# Patient Record
Sex: MALE | Race: WHITE | NOT HISPANIC OR LATINO | Employment: OTHER | URBAN - METROPOLITAN AREA
[De-identification: names, ages, dates, MRNs, and addresses within clinical notes are randomized per-mention and may not be internally consistent; named-entity substitution may affect disease eponyms.]

---

## 2017-01-11 ENCOUNTER — ALLIED HEALTH/NURSE VISIT (OUTPATIENT)
Dept: EDUCATION SERVICES | Facility: CLINIC | Age: 67
End: 2017-01-11
Payer: COMMERCIAL

## 2017-01-11 DIAGNOSIS — E11.42 TYPE 2 DIABETES, CONTROLLED, WITH PERIPHERAL NEUROPATHY (H): Primary | ICD-10-CM

## 2017-01-11 PROCEDURE — 95250 CONT GLUC MNTR PHYS/QHP EQP: CPT

## 2017-01-11 RX ORDER — LANCETS
EACH MISCELLANEOUS
Qty: 1 BOX | Refills: 1 | Status: SHIPPED
Start: 2017-01-11 | End: 2018-03-22

## 2017-01-11 NOTE — MR AVS SNAPSHOT
"              After Visit Summary   1/11/2017    Felipe Campbell    MRN: [id number]           Patient Information     Date Of Birth          1950        Visit Information        Provider Department      1/11/2017 4:00 PM  DIABETIC ED RESOURCE [facility]        Today's Diagnoses     Type 2 diabetes, controlled, with peripheral neuropathy (H)    -  1       Care Instructions    1. After 2 hours, you will be prompted to enter 2 blood sugar readings to calibrate the . Take two different samples (from different fingers). Wash your hands well before calibrating.    2. Check blood sugar once at least every 12 hours and enter it into the Dexcom  to calibrate. Checking blood sugar before meals and/or at bedtime is recommended. Blood sugar levels must be between  for the  to accept the calibration.    3. Record what you eat at meals and snacks with portion eaten. Record amount of carbohydrate grams in the Dexcom  when you eat to help track response to food intake. Record exercise type and time. Record medications you take and the time they are taken.     4. When monitor reads that \"Sensor needs to be Changed,\" go into menu function and hit \"STOP\" (NOT shutdown)    5. If battery power is low (will see indicator on monitor) and need to recharge the monitor, only charge for 1-3 hours.  DO NOT charge for more than 3 hours since it can damage monitor!    6. Avoid taking Tylenol while wearing the Dexcom, as it can cause inaccurate glucose readings.     7. Return all equipment and any food/exercise/medication logs to the [facility] on 1/18/17 (see checklist).    8. After Dr Schneider reviews report we will contact you to set up a follow up appointment for review of results.          Follow-ups after your visit        Your next 10 appointments already scheduled     Jan 18, 2017  9:15 AM   Diabetic Education with  DIABETIC ED RESOURCE   [facility] ([place] " "[facility]) [address] [facility] [address] [place] [address] [phone]              Who to contact     If you have questions or need follow up information about today's clinic visit or your schedule please contact [facility] directly at [phone].  Normal or non-critical lab and imaging results will be communicated to you by MyChart, letter or phone within 4 business days after the clinic has received the results. If you do not hear from us within 7 days, please contact the clinic through MyChart or phone. If you have a critical or abnormal lab result, we will notify you by phone as soon as possible.  Submit refill requests through [email or web address] or call your pharmacy and they will forward the refill request to us. Please allow 3 business days for your refill to be completed.          Additional Information About Your Visit        MyChart Information     [email or web address] lets you send messages to your doctor, view your test results, renew your prescriptions, schedule appointments and more. To sign up, go to [email or web address] . Click on \"Log in\" on the left side of the screen, which will take you to the Welcome page. Then click on \"Sign up Now\" on the right side of the page.     You will be asked to enter the access code listed below, as well as some personal information. Please follow the directions to create your username and password.     Your access code is: [email or web address]  Expires: 3/6/2017  9:11 AM     Your access code will  in 90 days. If you need help or a new code, please call your [facility] clinic or [phone].        Care EveryWhere ID     This is your Care EveryWhere ID. This could be used by other organizations to access your [facility] medical records  [id number]         Blood Pressure from Last 3 Encounters:   16 132/72   16 119/80   16 137/81    Weight from Last 3 Encounters:   16 226 lb (102.513 kg)   16 229 lb (103.874 kg)   16 225 lb " (102.059 kg)              Today, you had the following     No orders found for display         Today's Medication Changes          These changes are accurate as of: 1/11/17  5:55 PM.  If you have any questions, ask your nurse or doctor.               These medicines have changed or have updated prescriptions.        Dose/Directions    blood glucose monitoring lancets   This may have changed:  additional instructions   Used for:  Type 2 diabetes, controlled, with peripheral neuropathy (H)        Use to test blood sugar 2 times daily or as directed.  Ok to substitute alternative if insurance prefers.   Quantity:  1 Box   Refills:  1       blood glucose monitoring test strip   Commonly known as:  ACCU-CHEK SMARTVIEW   This may have changed:  See the new instructions.   Used for:  Type 2 diabetes, controlled, with peripheral neuropathy (H)        Use to test blood sugar 2 times daily or as directed.  Ok to substitute alternative if insurance prefers.   Quantity:  100 strip   Refills:  2            Where to get your medicines      These [id number] [place] [address] [place] [address], [facility] [address]     Phone:  [phone]    - blood glucose monitoring lancets  - blood glucose monitoring test strip             Primary Care Provider Office Phone # Fax #    Curt Schneider [phone]       [address] [address]        Thank you!     Thank you for choosing [facility]  for your care. Our goal is always to provide you with excellent care. Hearing back from our patients is one way we can continue to improve our services. Please take a few minutes to complete the written survey that you may receive in the mail after your visit with us. Thank you!             Your Updated Medication List - Protect others around you: Learn how to safely use, store and throw away  your medicines at www.disposemymeds.org.          This list is accurate as of: 1/11/17  5:55 PM.  Always use your most recent med list.                   Brand Name Dispense Instructions for use    ACE/ARB NOT PRESCRIBED (INTENTIONAL)      by Other route continuous prn.       aspirin 81 MG tablet      Take 1 tablet by mouth daily.       blood glucose monitoring lancets     1 Box    Use to test blood sugar 2 times daily or as directed.  Ok to substitute alternative if insurance prefers.       blood glucose monitoring test strip    ACCU-CHEK SMARTVIEW    100 strip    Use to test blood sugar 2 times daily or as directed.  Ok to substitute alternative if insurance prefers.       cetirizine 10 MG tablet    zyrTEC     Take 10 mg by mouth daily as needed for allergies       fish oil-omega-3 fatty acids 1000 MG capsule      Take 1 capsule by mouth 2 times daily       glyBURIDE 5 MG tablet    DIABETA /MICRONASE    360 tablet    TAKE 2 TABLETS BY MOUTH TWICE DAILY WITH MEALS to be refilled until patient calls       lisinopril 5 MG tablet    PRINIVIL/ZESTRIL    90 tablet    TAKE 1 TABLET(5 MG) BY MOUTH DAILY       melatonin 1 MG Tabs tablet      Take 2 mg by mouth nightly as needed for sleep       metFORMIN 500 MG tablet    GLUCOPHAGE    360 tablet    TAKE 2 TABLETS BY MOUTH TWICE DAILY WITH MEALS       pioglitazone 45 MG tablet    ACTOS    90 tablet    TAKE 1 TABLET(45 MG) BY MOUTH DAILY       pravastatin 80 MG tablet    PRAVACHOL    90 tablet    Take 1 tablet (80 mg) by mouth daily       sildenafil 100 MG cap/tab    VIAGRA    8 tablet    Take 1 tablet (100 mg) by mouth daily as needed for erectile dysfunction at least 30 minutes before intercourse.

## 2017-01-11 NOTE — PATIENT INSTRUCTIONS
"1. After 2 hours, you will be prompted to enter 2 blood sugar readings to calibrate the . Take two different samples (from different fingers). Wash your hands well before calibrating.    2. Check blood sugar once at least every 12 hours and enter it into the Dexcom  to calibrate. Checking blood sugar before meals and/or at bedtime is recommended. Blood sugar levels must be between  for the  to accept the calibration.    3. Record what you eat at meals and snacks with portion eaten. Record amount of carbohydrate grams in the Dexcom  when you eat to help track response to food intake. Record exercise type and time. Record medications you take and the time they are taken.     4. When monitor reads that \"Sensor needs to be Changed,\" go into menu function and hit \"STOP\" (NOT shutdown)    5. If battery power is low (will see indicator on monitor) and need to recharge the monitor, only charge for 1-3 hours.  DO NOT charge for more than 3 hours since it can damage monitor!    6. Avoid taking Tylenol while wearing the Dexcom, as it can cause inaccurate glucose readings.     7. Return all equipment and any food/exercise/medication logs to the [facility] on 1/18/17 (see checklist).    8. After Dr Schneider reviews report we will contact you to set up a follow up appointment for review of results.    "

## 2017-01-11 NOTE — PROGRESS NOTES
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    OBJECTIVE:    Lab Results:  A1C      8.1   12/6/2016   GLC       92   11/30/2016  HDL       57   11/30/2016    Vitals:  There were no vitals taken for this visit.    ASSESSMENT:    DexCom sensor started today.  DexCom sensor (Lot # [id number], Expiration date 5/19/17) was inserted with no resistance or bleeding at insertion site.    Pt will plan to wear the sensor through  1/17/17.    WRITTEN AND VERBAL INFORMATION GIVEN TO SUPPORT UNDERSTANDING OF:  DexCom CGM: Charge  for only 1-3 hours at most when low battery indicator appears. Sensor insertion, intention of monitoring for 7 days and avoiding swimming more than 30 minutes. SMBG at least 2 times after 2-3 hours wearing initially, then at least 1 time every 12 hours, enter events of food intake, medications/insulin, exercise, hypoglycemia. Understanding of program screens, alarms, use of buttons, graphs available for viewing, sample sensor reports, trouble shooting, emergency contact, removal of sensor, stop sensor when prompted, need to leave sensor unit and data collection sheets at clinic at designated time/date.    Can purchase 3M medical tape if more tape necessary for adhesion.       Patient verbalizes understanding of how to remove sensor and all instructions provided.     Educational and other materials:  Food/exercise/medication log sheets  Contact information    PLAN:  Pt was given instructions for tracking BG, medications, food intake and activity.    See Patient Instructions, AVS printed and provided to patient today.    Follow-up:    Patient to return all items associated with professional Continuous Glucose Monitor System to the [facility] by 1/18/17.     Melissa Garza RN  BSN CDE    Time Spent: 60  minutes  Encounter Type: Individual

## 2017-01-13 DIAGNOSIS — E78.5 HYPERLIPIDEMIA LDL GOAL <100: Primary | ICD-10-CM

## 2017-01-14 DIAGNOSIS — N52.9 ERECTILE DYSFUNCTION OF ORGANIC ORIGIN: Primary | ICD-10-CM

## 2017-01-16 NOTE — TELEPHONE ENCOUNTER
Viagra       Last Written Prescription Date:  06/12/2015  Last Fill Quantity: 8,   # refills: 3  Last Office Visit with [facility] prescribing provider: 12/06/2016  Future Office visit:       Routing refill request to provider for review/approval because:  Drug not on the [facility] or [facility] refill protocol or controlled substance

## 2017-01-16 NOTE — TELEPHONE ENCOUNTER
pravastatin (PRAVACHOL) 80 MG tablet     Last Written Prescription Date: 6/2/16  Last Fill Quantity: 90, # refills: 1  Last Office Visit with [facility], [facility] or [facility] prescribing provider: 12/6/16       CHOL      183   11/30/2016  HDL       57   11/30/2016  LDL       95   11/30/2016  LDL      110   9/16/2014  TRIG      154   11/30/2016  CHOLHDLRATIO      2.7   6/15/2015

## 2017-01-17 RX ORDER — PRAVASTATIN SODIUM 80 MG/1
TABLET ORAL
Qty: 90 TABLET | Refills: 1 | Status: SHIPPED | OUTPATIENT
Start: 2017-01-17 | End: 2017-06-13

## 2017-01-17 RX ORDER — SILDENAFIL CITRATE 100 MG
TABLET ORAL
Qty: 8 TABLET | Refills: 0 | Status: SHIPPED | OUTPATIENT
Start: 2017-01-17 | End: 2018-03-22

## 2017-01-17 NOTE — TELEPHONE ENCOUNTER
Prescription approved per [facility] Refill Protocol.  Carmen Powell RN

## 2017-01-18 ENCOUNTER — ALLIED HEALTH/NURSE VISIT (OUTPATIENT)
Dept: EDUCATION SERVICES | Facility: CLINIC | Age: 67
End: 2017-01-18
Payer: COMMERCIAL

## 2017-01-18 DIAGNOSIS — E11.42 TYPE 2 DIABETES, CONTROLLED, WITH PERIPHERAL NEUROPATHY (H): Primary | ICD-10-CM

## 2017-01-18 PROCEDURE — 99207 ZZC NO BILLABLE SERVICE THIS VISIT: CPT

## 2017-01-19 NOTE — PROGRESS NOTES
Patient returned his Dexcom transmitter and  along with his food records, blood glucose values.  Patient reports his insertion site is clean and dry, not any issue at this time.  All returned records reviewed and clarified with patient.    Melissa Garza RN  BSN CDE

## 2017-02-01 ENCOUNTER — VIRTUAL VISIT (OUTPATIENT)
Dept: EDUCATION SERVICES | Facility: CLINIC | Age: 67
End: 2017-02-01
Payer: COMMERCIAL

## 2017-02-01 DIAGNOSIS — R73.9 HYPERGLYCEMIA: Primary | ICD-10-CM

## 2017-02-01 PROCEDURE — 95251 CONT GLUC MNTR ANALYSIS I&R: CPT

## 2017-02-01 NOTE — PROGRESS NOTES
Dexcom Professional Continuous Glucose Monitor Interpretation     Patient History:   1. Type of Diabetes: Type 2 diabetes  2. Duration of diabetes or year of diagnosis: not assessed  3. Current treatment regimen (include all diabetes medications, dose & dosing frequency/timing): Oral Medications: Actos - Dose: 45 mg daily, Glyburide - Dose: 10 mg bid,  and Metformin - Dose: 1000 mg bid,   4. Most Recent A1c Result:  A1C      8.1   2016  5. Indication/s for Dexcom study: elevated A1C without correlating BG fingerstick values..    Statistics:   1. Total number of sensor values is 1805. This averages to 288 values on each of the full days. 288 per day is ideal (the first and last day generally provide half or less of the usual number of readings because these are typically not full days).  The test is not optimal if <50% of the readings are available per day.  2. Number of meter values and paired readings is adequate.  Less than 2 (q 12 hours) paired readings per day may be associated with a less reliable test.  3. Standard deviation is: 50.  Standard deviation (SD) indicates how variable sensor readings are.  Patients who do not have diabetes may have a SD around 20, while someone with suboptimal diabetes control may have a SD of 60 or more.  4. Glucose excursions:     Percent in target is: 48%  Percent above target is: 41%  Percent below target is: 11%                        Data evaluation:   1. Sensor modal day evaluation shows the followin. Consistent day-to-day patterns noted: pattern of daytime hyperglycemia from 8:20-9:25 am.  2. Average blood sugar: 128 mg/dL.  3. The overnight ranges from  mg/dL. The average BG on all nights (10 pm-6 am)  ranges from  mg/dL.  4. The premeal is usually at the target range.   5. The postmeal is at the target range.  Patient's Logbook shows the following:   Carbohydrate counting is: accurate  Medication and/or insulin dosing is: accurate     Assessment and  Plan:  Patient is eating a reasonable amount of carbohydrate at meals and spreading his meals out by 4-6 hours. Even with reasonable CHO intake patient post prandial BG values are going significantly above goal range.  Patient blood glucose testing is generally done when blood glucose values are in lower ranges.  Recommend that patient begin basal/ bolus insulin regime at 0.1 u/kg of basal and 0.1 u/kg of bolus insulin per protocol.  Can consider 10 units of basal insulin and would divide the 10 units of bolus insulin between his 3 meals.  Would recommend Humalog for a bolus insulin if covered by his insurance due to the sharp rise in bg within an hour of eating his meals. Recommend that patient stop glyburide and Actos and continue metformin.  Copy of report given to provider for review, interpretation and recommendation.    Melissa Garza RN  BSN CDE    Time Spent: 60 minutes  Any diabetes medication dose changes were made via the CDE Protocol and Collaborative Practice Agreement with the patient's referring provider. A copy of this encounter was shared with the provider.

## 2017-02-09 ENCOUNTER — TELEPHONE (OUTPATIENT)
Dept: FAMILY MEDICINE | Facility: CLINIC | Age: 67
End: 2017-02-09

## 2017-02-09 DIAGNOSIS — E11.42 TYPE 2 DIABETES, CONTROLLED, WITH PERIPHERAL NEUROPATHY (H): Primary | ICD-10-CM

## 2017-02-09 RX ORDER — GLYBURIDE 5 MG/1
TABLET ORAL
Qty: 360 TABLET | Refills: 0 | Status: SHIPPED | OUTPATIENT
Start: 2017-02-09 | End: 2017-03-09

## 2017-02-09 NOTE — TELEPHONE ENCOUNTER
glyBURIDE (DIABETA / MICRONASE) 5 MG tablet        Last Written Prescription Date: 6-2-16  Last Fill Quantity: 360, # refills: 1  Last Office Visit with [facility], [facility] or [facility] prescribing provider:  12-6-16        BP Readings from Last 3 Encounters:   12/06/16 132/72   11/08/16 119/80   06/02/16 137/81     MICROL      215   5/27/2016  No results found for this basename: microalbumin  CREATININE   Date Value Ref Range Status   11/30/2016 0.96 0.66 - 1.25 mg/dL Final   ]  GFR ESTIMATE   Date Value Ref Range Status   11/30/2016 79 >60 mL/min/1.7m2 Final     Comment:     Non  GFR Calc   05/27/2016 70 >60 mL/min/1.7m2 Final     Comment:     Non  GFR Calc   06/15/2015 80 >60 mL/min/1.7m2 Final     Comment:     Non  GFR Calc     GFR ESTIMATE IF BLACK   Date Value Ref Range Status   11/30/2016 >90   GFR Calc   >60 mL/min/1.7m2 Final   05/27/2016 85 >60 mL/min/1.7m2 Final     Comment:      GFR Calc   06/15/2015 >90   GFR Calc   >60 mL/min/1.7m2 Final     CHOL      183   11/30/2016  HDL       57   11/30/2016  LDL       95   11/30/2016  LDL      110   9/16/2014  TRIG      154   11/30/2016  CHOLHDLRATIO      2.7   6/15/2015  AST       16   11/30/2016  ALT       26   11/30/2016  A1C      8.1   12/6/2016  A1C      7.2   5/27/2016  A1C      6.9   12/10/2015  A1C      7.4   5/19/2015  A1C      7.3   12/29/2014  POTASSIUM   Date Value Ref Range Status   11/30/2016 4.4 3.4 - 5.3 mmol/L Final

## 2017-02-09 NOTE — TELEPHONE ENCOUNTER
Patient is currently out of Glyburide and had to receive 3 day emergency refill from pharmacy. Requesting refill as soon as possible.    Please advise.    Thank you.    Katelyn GALLO

## 2017-02-09 NOTE — PROGRESS NOTES
I met with Jennifer Garza, certified diabetes educator , We discussed the continuous glucose monitor  Results . Clear the problem is two hour post prandial [ 2 hours after eating ] . I have reviewed and agree with plan of care as detailed below by Monisha Schneider MD

## 2017-02-09 NOTE — TELEPHONE ENCOUNTER
Prescription approved per [facility] Refill Protocol.    Patient notified via     Jacek Thurston RN

## 2017-03-02 DIAGNOSIS — R80.9 MICROALBUMINURIA: ICD-10-CM

## 2017-03-02 DIAGNOSIS — E11.42 TYPE 2 DIABETES, CONTROLLED, WITH PERIPHERAL NEUROPATHY (H): ICD-10-CM

## 2017-03-02 DIAGNOSIS — E11.40 TYPE 2 DIABETES MELLITUS WITH DIABETIC NEUROPATHY, WITHOUT LONG-TERM CURRENT USE OF INSULIN (H): ICD-10-CM

## 2017-03-02 RX ORDER — LISINOPRIL 5 MG/1
TABLET ORAL
Qty: 90 TABLET | Refills: 0 | Status: SHIPPED | OUTPATIENT
Start: 2017-03-02 | End: 2017-05-23

## 2017-03-02 NOTE — TELEPHONE ENCOUNTER
Prescription approved per [facility] Refill Protocol.  Nicolette Portillo RN

## 2017-03-02 NOTE — TELEPHONE ENCOUNTER
lisinopril (PRINIVIL,ZESTRIL) 5 MG tablet      Last Written Prescription Date: 11/28/16  Last Fill Quantity: 90, # refills: 0  Last Office Visit with [facility] or [facility] prescribing provider: 12/2/16       Potassium   Date Value Ref Range Status   11/30/2016 4.4 3.4 - 5.3 mmol/L Final     Creatinine   Date Value Ref Range Status   11/30/2016 0.96 0.66 - 1.25 mg/dL Final     BP Readings from Last 3 Encounters:   12/06/16 132/72   11/08/16 119/80   06/02/16 137/81     metFORMIN (GLUCOPHAGE) 500 MG tablet         Last Written Prescription Date: 12/6/16  Last Fill Quantity: 360, # refills: 0  Last Office Visit with [facility] or [facility] prescribing provider:  12/6/16        BP Readings from Last 3 Encounters:   12/06/16 132/72   11/08/16 119/80   06/02/16 137/81     Lab Results   Component Value Date    MICROL 215 05/27/2016     No results found for: MICROALBUMIN  Creatinine   Date Value Ref Range Status   11/30/2016 0.96 0.66 - 1.25 mg/dL Final   ]  GFR Estimate   Date Value Ref Range Status   11/30/2016 79 >60 mL/min/1.7m2 Final     Comment:     Non  GFR Calc   05/27/2016 70 >60 mL/min/1.7m2 Final     Comment:     Non  GFR Calc   06/15/2015 80 >60 mL/min/1.7m2 Final     Comment:     Non  GFR Calc     GFR Estimate If Black   Date Value Ref Range Status   11/30/2016 >90   GFR Calc   >60 mL/min/1.7m2 Final   05/27/2016 85 >60 mL/min/1.7m2 Final     Comment:      GFR Calc   06/15/2015 >90   GFR Calc   >60 mL/min/1.7m2 Final     Lab Results   Component Value Date    CHOL 183 11/30/2016     Lab Results   Component Value Date    HDL 57 11/30/2016     Lab Results   Component Value Date    LDL 95 11/30/2016     09/16/2014     Lab Results   Component Value Date    TRIG 154 11/30/2016     Lab Results   Component Value Date    CHOLHDLRATIO 2.7 06/15/2015     Lab Results   Component Value Date    AST 16 11/30/2016     Lab Results    Component Value Date    ALT 26 11/30/2016     Lab Results   Component Value Date    A1C 8.1 12/06/2016    A1C 7.2 05/27/2016    A1C 6.9 12/10/2015    A1C 7.4 05/19/2015    A1C 7.3 12/29/2014     Potassium   Date Value Ref Range Status   11/30/2016 4.4 3.4 - 5.3 mmol/L Final

## 2017-03-09 ENCOUNTER — ALLIED HEALTH/NURSE VISIT (OUTPATIENT)
Dept: EDUCATION SERVICES | Facility: CLINIC | Age: 67
End: 2017-03-09
Payer: COMMERCIAL

## 2017-03-09 VITALS — WEIGHT: 225 LBS | BODY MASS INDEX: 27.39 KG/M2

## 2017-03-09 DIAGNOSIS — E11.42 TYPE 2 DIABETES, CONTROLLED, WITH PERIPHERAL NEUROPATHY (H): ICD-10-CM

## 2017-03-09 PROCEDURE — G0108 DIAB MANAGE TRN  PER INDIV: HCPCS

## 2017-03-09 RX ORDER — GLYBURIDE 5 MG/1
TABLET ORAL
Qty: 360 TABLET | Refills: 0 | Status: SHIPPED
Start: 2017-03-09 | End: 2017-03-23

## 2017-03-09 NOTE — MR AVS SNAPSHOT
"              After Visit Summary   3/9/2017    Felipe Campbell    MRN: [id number]           Patient Information     Date Of Birth          1950        Visit Information        Provider Department      3/9/2017 9:30 AM  DIABETIC ED RESOURCE [facility]        Care Instructions    Check insurance coverage for insulins.    [facility]          Follow-ups after your visit        Your next 10 appointments already scheduled     Mar 23, 2017  8:30 AM CDT   Diabetic Education with  DIABETIC ED RESOURCE   [facility] ([facility]    [address] [facility] [address]   [phone]            Mar 30, 2017  8:30 AM CDT   Diabetic Education with  DIABETIC ED RESOURCE   [facility] ([facility]    [address] [facility] [address]   [phone]              Who to contact     If you have questions or need follow up information about today's clinic visit or your schedule please contact [facility] directly at [phone].  Normal or non-critical lab and imaging results will be communicated to you by [facility], letter or phone within 4 business days after the clinic has received the results. If you do not hear from us within 7 days, please contact the clinic through [facility] or phone. If you have a critical or abnormal lab result, we will notify you by phone as soon as possible.  Submit refill requests through [facility] or call your pharmacy and they will forward the refill request to us. Please allow 3 business days for your refill to be completed.          Additional Information About Your Visit        [facility] Information     [facility] lets you send messages to your doctor, view your test results, renew your prescriptions, schedule appointments and more. To sign up, go to [email or web address] . Click on \"Log in\" on the left side of the screen, which will take you to the Welcome page. Then click on \"Sign up Now\" " on the right side of the page.     You will be asked to enter the access code listed below, as well as some personal information. Please follow the directions to create your username and password.     Your access code is: [email or web address]  Expires: 2017 10:12 AM     Your access code will  in 90 days. If you need help or a new code, please call your [facility] clinic or [phone].        Care EveryWhere ID     This is your Care EveryWhere ID. This could be used by other organizations to access your [facility] medical records  [id number]         Blood Pressure from Last 3 Encounters:   16 132/72   16 119/80   16 137/81    Weight from Last 3 Encounters:   16 226 lb (102.5 kg)   16 229 lb (103.9 kg)   16 225 lb (102.1 kg)              Today, you had the following     No orders found for display       Primary Care Provider Office Phone # Fax #    Curt Schneider [phone]       [facility] [address] [place] [id number]        Thank you!     Thank you for choosing [facility]  for your care. Our goal is always to provide you with excellent care. Hearing back from our patients is one way we can continue to improve our services. Please take a few minutes to complete the written survey that you may receive in the mail after your visit with us. Thank you!             Your Updated Medication List - Protect others around you: Learn how to safely use, store and throw away your medicines at www.disposemymeds.org.          This list is accurate as of: 3/9/17 10:12 AM.  Always use your most recent med list.                   Brand Name Dispense Instructions for use    ACE/ARB NOT PRESCRIBED (INTENTIONAL)      by Other route continuous prn.       aspirin 81 MG tablet      Take 1 tablet by mouth daily.       blood glucose monitoring lancets     1 Box    Use to test blood sugar 2 times daily or as directed.  Ok to substitute alternative if  insurance prefers.       blood glucose monitoring test strip    ACCU-CHEK SMARTVIEW    100 strip    Use to test blood sugar 2 times daily or as directed.  Ok to substitute alternative if insurance prefers.       cetirizine 10 MG tablet    zyrTEC     Take 10 mg by mouth daily as needed for allergies       fish oil-omega-3 fatty acids 1000 MG capsule      Take 1 capsule by mouth 2 times daily       glyBURIDE 5 MG tablet    DIABETA /MICRONASE    360 tablet    TAKE 2 TABLETS BY MOUTH TWICE DAILY WITH MEALS to be refilled until patient calls       lisinopril 5 MG tablet    PRINIVIL/ZESTRIL    90 tablet    TAKE 1 TABLET(5 MG) BY MOUTH DAILY       melatonin 1 MG Tabs tablet      Take 2 mg by mouth nightly as needed for sleep       metFORMIN 500 MG tablet    GLUCOPHAGE    360 tablet    TAKE 2 TABLETS BY MOUTH TWICE DAILY WITH MEALS       pioglitazone 45 MG tablet    ACTOS    90 tablet    TAKE 1 TABLET(45 MG) BY MOUTH DAILY       pravastatin 80 MG tablet    PRAVACHOL    90 tablet    TAKE 1 TABLET(80 MG) BY MOUTH DAILY       VIAGRA 100 MG cap/tab   Generic drug:  sildenafil     8 tablet    TAKE 1 TABLET BY MOUTH DAILY AS NEEDED FOR ERECTILE DYSFUNCTION AT LEAST 30 MINUTES BEFORE INTERCOURSE

## 2017-03-09 NOTE — NURSING NOTE
Diabetes Self Management Training: Follow-up Visit    Felipe Campbell presents today for education, evaluation of glucose control and discussion of  Recommendation to start insulin  related to Type 2 diabetes.    He is accompanied by self    Patient's diabetes management related comments/concerns: Do I need to eat differently?    Patient would like this visit to be focused around the following diabetes-related behaviors and goals: Problem Solving    ASSESSMENT:  Patient Problem List reviewed for relevant medical history and current medical status.  If he gets up in the middle of the night he can sense if he is low and then tests his BG and treats the low.  BG values trend down overnight.  May benefit from slight decrease in his evening glyburide dose.  Reviewed with patient results of his CGM study showing the increased levels of post prandial glucose and how he may have not been able to see this with his BG testing.  Also reviewed hypoglycemia frequency.  Patient was hoping not to need to start insulin but sees the importance of doing so.  He was willing to do a practice injection into his abdomen while in office today and was pleased that the needle was small.  Patient will check insurance coverage and we will plan to have him start in 2 weeks with a follow up 1 week later.  Will plan to decrease oral agents as patient blood glucose control is improved with the insulin dosing.  Will plan to start basal /bolus regime.  Patient is retiring at the end of March.      Current Diabetes Management per Patient:  Taking diabetes medications?   yes:     Diabetes Medication(s)     Biguanides Sig    metFORMIN (GLUCOPHAGE) 500 MG tablet TAKE 2 TABLETS BY MOUTH TWICE DAILY WITH MEALS    Sulfonylureas Sig    glyBURIDE (DIABETA /MICRONASE) 5 MG tablet TAKE 2 TABLETS BY MOUTH TWICE DAILY WITH MEALS to be refilled until patient calls    Insulin Sensitizing Agents Sig    pioglitazone (ACTOS) 45 MG tablet TAKE 1 TABLET(45 MG) BY  "MOUTH DAILY          *Abbreviated insulin dose documentation key: Insulin Trade Name (wguwfzydd-omxqb-wvujvq-bedtime) - i.e. Humalog 5-5-5-0 (Humalog 5 units at breakfast, 5 units at lunch, and 5 units at dinner).    Patient glucose self monitoring as follows: 2-3 times daily.     BG values are: Not in goal  Patient's most recent   Lab Results   Component Value Date    A1C 8.1 12/06/2016    is not meeting goal of <7.0    Nutrition:   Patient states that he tends to eat more of his days food at breakfast and lunch and then eats a smaller supper.    Physical Activity:    Not discussed today    Diabetes Complications:  Not discussed today.    Vitals:  Wt 225 lb (102.1 kg)  BMI 27.39 kg/m2  Estimated body mass index is 27.51 kg/(m^2) as calculated from the following:    Height as of 11/1/16: 6' 4\" (1.93 m).    Weight as of 12/22/16: 226 lb (102.5 kg).   Last 3 BP:   BP Readings from Last 3 Encounters:   12/06/16 132/72   11/08/16 119/80   06/02/16 137/81       History   Smoking Status     Former Smoker     Types: Cigarettes     Quit date: 1/1/1979   Smokeless Tobacco     Never Used       Labs:  Lab Results   Component Value Date    A1C 8.1 12/06/2016     Lab Results   Component Value Date    GLC 92 11/30/2016     Lab Results   Component Value Date    LDL 95 11/30/2016     09/16/2014     HDL Cholesterol   Date Value Ref Range Status   11/30/2016 57 >39 mg/dL Final   ]  GFR Estimate   Date Value Ref Range Status   11/30/2016 79 >60 mL/min/1.7m2 Final     Comment:     Non  GFR Calc     GFR Estimate If Black   Date Value Ref Range Status   11/30/2016 >90   GFR Calc   >60 mL/min/1.7m2 Final     Lab Results   Component Value Date    CR 0.96 11/30/2016     No results found for: MICROALBUMIN    Health Beliefs and Attitudes:   Patient Activation Measure Survey Score:  No flowsheet data found.    Stage of Change: PREPARATION (Decided to change - considering how)      Diabetes knowledge and " skills assessment:     Patient is knowledgeable in diabetes management concepts related to: Healthy Eating and Monitoring    Patient needs further education on the following diabetes management concepts: Taking Medication and Problem Solving    Barriers to Learning Assessment: No Barriers identified    Based on learning assessment above, most appropriate setting for further diabetes education would be: Individual setting.    INTERVENTION:    Education provided today on:  AADE Self-Care Behaviors:  Taking Medication: types of insulin and how they work.  When to take insulin.  Problem Solving: high blood glucose - causes, signs/symptoms, treatment and prevention and low blood glucose - causes, signs/symptoms, treatment and prevention  Healthy Coping: benefit of changing over to insulin.    Opportunities for ongoing education and support in diabetes-self management were discussed.    Pt verbalized understanding of concepts discussed and recommendations provided today.       Education Materials Provided:  Copy of his CGM study results.    PLAN:  See Patient Instructions for co-developed, patient-stated behavior change goals.  AVS printed and provided to patient today.    FOLLOW-UP:  Follow-up appointment scheduled on 3/16/17 and 3/30/17.  Chart routed to referring provider.    Ongoing plan for education and support: Follow-up visit with diabetes educator in 2 weeks    Melissa Garza RN  BSN CDE    Time Spent: 60 minutes  Encounter Type: Individual    Any diabetes medication dose changes were made via the CDE Protocol and Collaborative Practice Agreement with the patient's referring provider. A copy of this encounter was shared with the provider.

## 2017-03-20 ENCOUNTER — TELEPHONE (OUTPATIENT)
Dept: EDUCATION SERVICES | Facility: CLINIC | Age: 67
End: 2017-03-20

## 2017-03-20 NOTE — TELEPHONE ENCOUNTER
Patient sent the following e-mail:    Melissa Garza:  Below are the insulin provider/brands that are and are not covered by my medical insurance:    BASALS  LEVEMIR FLEXPEN/FLEXTOUCH INJ  Tier 2  LEVEMIR INJ                     Tier 2   LANTUS INJ                    Tier 2  LANTUS SOLOSTAR INJ               Tier 2   TRESIBA INJ        Tier 2   TOUJEO SOLOSTAR INJ         Tier 2   BASAGLAR Not covered    BOLUS   NOVOLOG MIX INJ  Tier 2  NOVOLOG PENFILL INJ  Tier 2  NOVOLOG FLEXPEN INJ Tier 2  NOVOLOG INJ              Tier 2  NOVOLOG MIX FLEXPEN INJ         Tier 2   HUMALOG INJ  Not covered    Please let me know if you have any questions prior to our appointment on Thursday, March 23.    Thanks,  Leonid [email or web address]  [phone]    Response sent to patient:   Thank you Leonid for sending this important information. I will forward this to Melissa.    Carrol Yun RD, CDE  Diabetes

## 2017-03-23 ENCOUNTER — ALLIED HEALTH/NURSE VISIT (OUTPATIENT)
Dept: EDUCATION SERVICES | Facility: CLINIC | Age: 67
End: 2017-03-23
Payer: COMMERCIAL

## 2017-03-23 VITALS — WEIGHT: 224 LBS | BODY MASS INDEX: 27.27 KG/M2

## 2017-03-23 DIAGNOSIS — E11.42 TYPE 2 DIABETES, CONTROLLED, WITH PERIPHERAL NEUROPATHY (H): Primary | ICD-10-CM

## 2017-03-23 PROCEDURE — G0108 DIAB MANAGE TRN  PER INDIV: HCPCS

## 2017-03-23 RX ORDER — GLYBURIDE 5 MG/1
TABLET ORAL
Qty: 360 TABLET | Refills: 0 | Status: SHIPPED | OUTPATIENT
Start: 2017-03-23 | End: 2017-03-30 | Stop reason: ALTCHOICE

## 2017-03-23 NOTE — PATIENT INSTRUCTIONS
Friday evening take metformin and 5 mg of glyburide. Sat morning take metformin and 5 mg of glyburide.  At supper on Sat take metformin and 5 mg of glyburide.  On Friday at bedtime take 10 units of Lantus.   On Sat take 4 units of Novolog 15 minutes before breakfast, 3 units before lunch and 3 units before supper.   Recommend to eat 3 carb choices at each meal.  Carry sugar with you at all times.  Call if concerns.

## 2017-03-23 NOTE — NURSING NOTE
Diabetes Self Management Training: Insulin Start    Felipe Campbell presents today for education and initiation of insulin related to Type 2 diabetes.    He is accompanied by spouse    Patient's diabetes management related comments/concerns: How do I keep insulin cool if I am on vacation?    Patient's emotional response to diabetes: expresses readiness to learn    Patient would like this visit to be focused around the following diabetes-related behaviors and goals: learning more about using insulin.    ASSESSMENT:  Patient Problem List and Family Medical History reviewed for relevant medical history, current medical status, and diabetes risk factors.    Current Diabetes Management per Patient:  Taking diabetes medications?   yes: glyburide 10 mg with breakfast and 7.5 mg with supper daily, Actos 45 mg with breakfast, metformin 1000 mg bid with meals.    *Abbreviated insulin dose documentation key: Insulin Trade Name (gstbcgcyr-hchrw-flezhr-bedtime) - i.e. Humalog 5-5-5-0 (Humalog 5 units at breakfast, 5 units at lunch, and 5 units at dinner).    Past Diabetes Education: Yes    Patient glucose self monitoring as follows: three times daily.   BG meter: Accu-chek Elida meter  BG results: pre meal values generally in goal range but 1 hr pp breakfast and lunch values are significantly elevated.  Patient reports that since he has decreased his glyburide dose down to 7.5 mg he has not experienced low BG over night.    BG values are: Not in goal  Patient's most recent   Lab Results   Component Value Date    A1C 8.1 12/06/2016    is not meeting goal of <7.0    Nutrition:  Patient currently eats 2-3 meals per day.  See CGM records that were scanned into chart prior.    Physical Activity:    Not assessed today      Diabetes Complications:  Instructed re hypoglycemia sx, treatment and prevention.    Vitals:  Wt 224 lb (101.6 kg)  BMI 27.27 kg/m2  Estimated body mass index is 27.27 kg/(m^2) as calculated from the following:     "Height as of 11/1/16: 6' 4\" (1.93 m).    Weight as of this encounter: 224 lb (101.6 kg).   Last 3 BP:   BP Readings from Last 3 Encounters:   12/06/16 132/72   11/08/16 119/80   06/02/16 137/81       History   Smoking Status     Former Smoker     Types: Cigarettes     Quit date: 1/1/1979   Smokeless Tobacco     Never Used       Labs:  Lab Results   Component Value Date    A1C 8.1 12/06/2016     Lab Results   Component Value Date    GLC 92 11/30/2016     Lab Results   Component Value Date    LDL 95 11/30/2016     HDL Cholesterol   Date Value Ref Range Status   11/30/2016 57 >39 mg/dL Final   ]  GFR Estimate   Date Value Ref Range Status   11/30/2016 79 >60 mL/min/1.7m2 Final     Comment:     Non  GFR Calc     GFR Estimate If Black   Date Value Ref Range Status   11/30/2016 >90   GFR Calc   >60 mL/min/1.7m2 Final     Lab Results   Component Value Date    CR 0.96 11/30/2016     No results found for: MICROALBUMIN    Socio/Economic History:    Support system: spouse/significant other    Health Beliefs and Attitudes:   Patient Activation Measure Survey Score:  No flowsheet data found.    Stage of Change: PREPARATION (Decided to change - considering how)      Diabetes knowledge and skills assessment:     Patient is knowledgeable in diabetes management concepts related to: Healthy Eating, monitoring    Patient needs further education on the following diabetes management concepts: Taking Medication and Problem Solving    Barriers to Learning Assessment: No Barriers identified    Based on learning assessment above, most appropriate setting for further diabetes education would be: Individual setting.    INTERVENTION:    Insulin administration technique taught using a Pen for Lantus - 10 units and NovoLog - 4-3-3-0.  Patient verbalized understanding and was able to perform an accurate return demonstration of administration technique.     Education provided today on:  AADE Self-Care " Behaviors:  Taking Medication: action of prescribed medication, drawing up, administering and storing injectable diabetes medications, proper site selection and rotation for injections, side effects of prescribed medications and when to take medications  Problem Solving: high blood glucose - causes, signs/symptoms, treatment and prevention, low blood glucose - causes, signs/symptoms, treatment and prevention and carrying a carbohydrate source at all times  Reducing Risks: A1C - goals, relating to blood glucose levels, how often to check    Opportunities for ongoing education and support in diabetes-self management were discussed.    Pt verbalized understanding of concepts discussed and recommendations provided today.       Education Materials Provided:  2 Pen needle sample packs, Insulin information on Lantus and Novolog, Hypoglycemia Signs and Symptoms, Glucose Tablet sample   and Vehicle Services: Just the Facts - Medical Conditions and your 's License  [place] Department [employer] Public Safety: Insulin-Treated Diabetes Mellitus Report  Coupon for Novolog.    PLAN:  See Patient Instructions for co-developed, patient-stated behavior change goals.  AVS printed and provided to patient today.  Patient will stop Actos on Friday.  Patient will decrease his glyburide dose down to 5 mg bid starting on Friday evening.  Patient will continue his metformin doses.  Patient will start Lantus 10 units at HS on Friday evening ( 0.1 u/kg)  Patient will start Novolog on Saturday pre meals. ( 0.1 u/kg)  After 4 days, if 75% of BG values are above 200 mg/dl, patient will increase his Lantus dose up to 15 units and take Novolog 5 units before each meal.  If BG values are elevated but not 75% above 200 mg/dl, patient will call in blood glucose values for review and insulin adjustment.  Patient will call or email if any concerns.    Patient to inform the [place] Department [employer] Public [employer] of insulin  use.    FOLLOW-UP:  Follow-up appointment scheduled on Thursday March 30.  Chart routed to referring provider.  Next visit instruct patient on dose adjustment, glucagon use and how to keep insulin cold when on vacation.    Ongoing plan for education and support: Follow-up visit with diabetes educator in 1 week or sooner if concerns    Melissa Garza RN  BSN CDE    Time Spent: 60 minutes  Encounter Type: Individual    Any diabetes medication dose changes were made via the CDE Protocol and Collaborative Practice Agreement with the patient's referring provider. A copy of this encounter was shared with the provider.

## 2017-03-30 ENCOUNTER — ALLIED HEALTH/NURSE VISIT (OUTPATIENT)
Dept: EDUCATION SERVICES | Facility: CLINIC | Age: 67
End: 2017-03-30
Payer: COMMERCIAL

## 2017-03-30 VITALS — WEIGHT: 226.5 LBS | BODY MASS INDEX: 27.57 KG/M2

## 2017-03-30 DIAGNOSIS — E11.42 TYPE 2 DIABETES, CONTROLLED, WITH PERIPHERAL NEUROPATHY (H): Primary | ICD-10-CM

## 2017-03-30 PROCEDURE — G0108 DIAB MANAGE TRN  PER INDIV: HCPCS

## 2017-03-30 RX ORDER — IBUPROFEN 600 MG/1
1 TABLET ORAL ONCE
Qty: 1 MG | Refills: 1 | Status: SHIPPED | OUTPATIENT
Start: 2017-03-30 | End: 2019-07-19

## 2017-03-30 NOTE — PATIENT INSTRUCTIONS
Discontinue the glyburide doses.  Recommend after 3 days if your fasting levels are continuing to be below 80 mg/dl to reduce your Lantus dose down to 9 units.    If your pre lunch blood glucose values continue to be below 80 mg/dl, reduce your pre breakfast Novolog dose down to 3 units.    Call or send My Chart  Message or email on Wednesdays with BG levels and doses.   Call sooner if concerns.

## 2017-03-30 NOTE — MR AVS SNAPSHOT
After Visit Summary   3/30/2017    Felipe Campbell    MRN: [id number]           Patient Information     Date Of Birth          1950        Visit Information        Provider Department      3/30/2017 8:30 AM  DIABETIC ED RESOURCE [facility]        Today's Diagnoses     Type 2 diabetes, controlled, with peripheral neuropathy (H)    -  1      Care Instructions    Discontinue the glyburide doses.  Recommend after 3 days if your fasting levels are continuing to be below 80 mg/dl to reduce your Lantus dose down to 9 units.    If your pre lunch blood glucose values continue to be below 80 mg/dl, reduce your pre breakfast Novolog dose down to 3 units.    Call or send My Chart  Message or email on Wednesdays with BG levels and doses.   Call sooner if concerns.        Follow-ups after your visit        Who to contact     If you have questions or need follow up information about today's clinic visit or your schedule please contact [facility] directly at [phone].  Normal or non-critical lab and imaging results will be communicated to you by [employer], letter or phone within 4 business days after the clinic has received the results. If you do not hear from us within 7 days, please contact the clinic through [facility] or phone. If you have a critical or abnormal lab result, we will notify you by phone as soon as possible.  Submit refill requests through [facility] or call your pharmacy and they will forward the refill request to us. Please allow 3 business days for your refill to be completed.          Additional Information About Your Visit        [employer] Information     [facility] gives you secure access to your electronic health record. If you see a primary care provider, you can also send messages to your care team and make appointments. If you have questions, please call your primary care clinic.  If you do not have a primary care provider, please call [phone] and they will  assist you.        Care EveryWhere ID     This is your Care EveryWhere ID. This could be used by other organizations to access your [facility] medical records  [id number]        Your Vitals Were     BMI (Body Mass Index)                   27.57 kg/m2            Blood Pressure from Last 3 Encounters:   12/06/16 132/72   11/08/16 119/80   06/02/16 137/81    Weight from Last 3 Encounters:   03/30/17 226 lb 8 oz (102.7 kg)   03/23/17 224 lb (101.6 kg)   03/09/17 225 lb (102.1 kg)              Today, you had the following     No orders found for display         Today's Medication Changes          These changes are accurate as of: 3/30/17  9:27 AM.  If you have any questions, ask your nurse or doctor.               Start taking these medicines.        Dose/Directions    GLUCAGON EMERGENCY 1 MG kit   Used for:  Type 2 diabetes, controlled, with peripheral neuropathy (H)   Generic drug:  glucagon        Dose:  1 mg   Inject 1 mg Subcutaneous once for 1 dose   Quantity:  1 mg   Refills:  1         These medicines have changed or have updated prescriptions.        Dose/Directions    blood glucose monitoring test strip   Commonly known as:  ACCU-CHEK UNYQ   This may have changed:  additional instructions   Used for:  Type 2 diabetes, controlled, with peripheral neuropathy (H)        Use to test blood sugar 4-5 times daily or as directed.  Ok to substitute alternative if insurance prefers.   Quantity:  100 strip   Refills:  2         Stop taking these medicines if you haven't already. Please contact your care team if you have questions.     glyBURIDE 5 MG tablet   Commonly known as:  DIABETA /MICRONASE                Where to get your medicines      Some of these will need a paper prescription and others can be bought over the counter.  Ask your nurse if you have questions.     Bring a paper prescription for each of these medications     blood glucose monitoring test strip    GLUCAGON EMERGENCY 1 MG kit                 Primary Care Provider Office Phone # Fax #    Curt Schneider [phone]       [address]  [place] [address]        Thank you!     Thank you for choosing [facility]  for your care. Our goal is always to provide you with excellent care. Hearing back from our patients is one way we can continue to improve our services. Please take a few minutes to complete the written survey that you may receive in the mail after your visit with us. Thank you!             Your Updated Medication List - Protect others around you: Learn how to safely use, store and throw away your medicines at www.disposemymeds.org.          This list is accurate as of: 3/30/17  9:27 AM.  Always use your most recent med list.                   Brand Name Dispense Instructions for use    ACE/ARB NOT PRESCRIBED (INTENTIONAL)      by Other route continuous prn.       aspirin 81 MG tablet      Take 1 tablet by mouth daily.       blood glucose monitoring lancets     1 Box    Use to test blood sugar 2 times daily or as directed.  Ok to substitute alternative if insurance prefers.       blood glucose monitoring test strip    ACCU-CHEK SMARTVIEW    100 strip    Use to test blood sugar 4-5 times daily or as directed.  Ok to substitute alternative if insurance prefers.       cetirizine 10 MG tablet    zyrTEC     Take 10 mg by mouth daily as needed for allergies       fish oil-omega-3 fatty acids 1000 MG capsule      Take 1 capsule by mouth 2 times daily       GLUCAGON EMERGENCY 1 MG kit   Generic drug:  glucagon     1 mg    Inject 1 mg Subcutaneous once for 1 dose       insulin aspart 100 UNIT/ML injection    NovoLOG FLEXPEN    30 mL    4 units before breakfast, 3 units before lunch, 3 units before dinner       insulin glargine 100 UNIT/ML injection    LANTUS SOLOSTAR    15 mL    Inject 10 Units Subcutaneous At Bedtime       insulin pen needle 31G X 6 MM     100 each    Use 4 daily or as directed.        lisinopril 5 MG tablet    PRINIVIL/ZESTRIL    90 tablet    TAKE 1 TABLET(5 MG) BY MOUTH DAILY       melatonin 1 MG Tabs tablet      Take 2 mg by mouth nightly as needed for sleep       metFORMIN 500 MG tablet    GLUCOPHAGE    360 tablet    TAKE 2 TABLETS BY MOUTH TWICE DAILY WITH MEALS       pravastatin 80 MG tablet    PRAVACHOL    90 tablet    TAKE 1 TABLET(80 MG) BY MOUTH DAILY       VIAGRA 100 MG cap/tab   Generic drug:  sildenafil     8 tablet    TAKE 1 TABLET BY MOUTH DAILY AS NEEDED FOR ERECTILE DYSFUNCTION AT LEAST 30 MINUTES BEFORE INTERCOURSE

## 2017-03-30 NOTE — NURSING NOTE
Diabetes Self Management Training: Follow-up Visit    Felipe Campbell presents today for education, evaluation of glucose control and modification of medication(s) related to Type 2 diabetes.    He is accompanied by self and spouse    Patient's diabetes management related comments/concerns: I am feeling better.  I have more energy and I note less neuropathy in my feet.    Patient would like this visit to be focused around the following diabetes-related behaviors and goals: Taking Medication and Problem Solving    ASSESSMENT:  Patient Problem List reviewed for relevant medical history and current medical status.  Patient fasting blood glucose levels are too low with 3 values of 7 under 70 mg/dl.   BG levels before lunch are also too low with 3 of 5 values below 70 mg/dl.  Patient has noted that occasionally he feels low mid afternoon and at HS  and will have a snack.  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  Patient can benefit from discontinuing his glyburide and possible decreased doses of insulin. Patient may also benefit from having glucagon at home.  Patient may benefit from use of alternate site testing as he is currently only using one thumb to joshua for blood testing.  Patient will benefit from the development of a correction factor once his daily dose is established.    Current Diabetes Management per Patient:  Taking diabetes medications? Oral Medications: Glyburide - Dose: 5 mg bid,  and Metformin - Dose: 1000 mg bid,  Injectable Medications: Lantus 0-0-0-10 and NovoLog Insulin 4-3-3-0, Side effects? Yes  Some hypoglycemia.    *Abbreviated insulin dose documentation key: Insulin Trade Name (haeviezzw-tebgk-hsrswh-bedtime) - i.e. Humalog 5-5-5-0 (Humalog 5 units at breakfast, 5 units at lunch, and 5 units at dinner).    Patient glucose self monitoring as follows: four times daily.   BG meter: Accu-chek Elida meter  BG results: fasting glucose- 54-74,  "pre-lunch glucose- 58-92, pre-supper glucose-  and bedtime- 73- 170 mg/dl.     BG values are: 46% in goal range of  before meals and HS.  Patient's most recent   Lab Results   Component Value Date    A1C 8.1 12/06/2016    is not meeting goal of <7.0    Nutrition:  Patient eats 3 meals per day and has been working to eat 3 carb choices at meals and 1-2 for a snack  when needed.    Physical Activity:    Not assessed.    Diabetes Complications:  Acute Complications: At risk for hypoglycemia? yes  Symptoms of low blood sugar? shaking and crabby  Frequency of hypoglycemia: often this week.  Patient carries a carbohydrate source with them regularly: Yes   Type of carbohydrate: glucose tablets and granola bar.  Patient wears medical identification for diabetes: patient has Type 2 diabetes on his cell phone front screen and has a medical ID card in his wallet and has a bracelet at home but has not been wearing it.     Vitals:  Wt 226 lb 8 oz (102.7 kg)  BMI 27.57 kg/m2  Estimated body mass index is 27.27 kg/(m^2) as calculated from the following:    Height as of 11/1/16: 6' 4\" (1.93 m).    Weight as of 3/23/17: 224 lb (101.6 kg).   Last 3 BP:   BP Readings from Last 3 Encounters:   12/06/16 132/72   11/08/16 119/80   06/02/16 137/81       History   Smoking Status     Former Smoker     Types: Cigarettes     Quit date: 1/1/1979   Smokeless Tobacco     Never Used       Labs:  Lab Results   Component Value Date    A1C 8.1 12/06/2016     Lab Results   Component Value Date    GLC 92 11/30/2016     Lab Results   Component Value Date    LDL 95 11/30/2016     HDL Cholesterol   Date Value Ref Range Status   11/30/2016 57 >39 mg/dL Final   ]  GFR Estimate   Date Value Ref Range Status   11/30/2016 79 >60 mL/min/1.7m2 Final     Comment:     Non  GFR Calc     GFR Estimate If Black   Date Value Ref Range Status   11/30/2016 >90   GFR Calc   >60 mL/min/1.7m2 Final     Lab Results   Component " Value Date    CR 0.96 11/30/2016     No results found for: MICROALBUMIN    Health Beliefs and Attitudes:   Patient Activation Measure Survey Score:  No flowsheet data found.    Stage of Change: ACTION (Actively working towards change)    Diabetes knowledge and skills assessment:     Patient is knowledgeable in diabetes management concepts related to: Healthy Eating, Monitoring, Problem Solving and Healthy Coping    Patient needs further education on the following diabetes management concepts: Taking Medication, Problem Solving and Reducing Risks    Barriers to Learning Assessment: No Barriers identified    Based on learning assessment above, most appropriate setting for further diabetes education would be: Individual setting.    INTERVENTION:    Education provided today on:  AADE Self-Care Behaviors:  Being Active: relationship to blood glucose and precautions to take, decreased Novolog dose if plans to be active within 2 hours of meals.  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  Monitoring: log and interpret results, individual blood glucose targets and frequency of monitoring, test with symptoms of low blood glucose and before driving.  Taking Medication: side effects of prescribed medications, dose adjustment and development of insulin to CHO ratio.  Problem Solving: low blood glucose - causes, signs/symptoms, treatment and prevention, carrying a carbohydrate source at all times, when to call health care provider and medical identification, use of glucagon.  Wife to learn how to test his BG and to give him an injection.    Opportunities for ongoing education and support in diabetes-self management were discussed.    Pt verbalized understanding of concepts discussed and recommendations provided today.       Education Materials Provided:  Glucagon information from [email or web address].    PLAN:  Recommend that patient stop his glyburide doses as of today.  If in 3 days,  fasting levels continue to be below  80 mg/dl, he should decrease his Lantus dose down to 9 units.  If his pre lunch values continue to be less than 80 mg/dl, he should also decrease his pre breakfast Novolog dose down to 3 units.  Patient to try not taking any Novolog pre supper on an evening before a musical performance and follow blood glucose control.  Patient to call with any concerns.    See Patient Instructions for co-developed, patient-stated behavior change goals.  AVS printed and provided to patient today.  Patient tested his blood glucose today before leaving with result of 98 mg/dl.    FOLLOW-UP:  Follow-up planned for BG review by phone/e-mail/Exo.   Chart routed to referring provider.    Ongoing plan for education and support: Follow-up communication with diabetes educator in 1 week.    Melissa Garza RN  BSN CDE    Time Spent: 60 minutes  Encounter Type: Individual    Any diabetes medication dose changes were made via the CDE Protocol and Collaborative Practice Agreement with the patient's referring provider. A copy of this encounter was shared with the provider.

## 2017-04-05 ENCOUNTER — TELEPHONE (OUTPATIENT)
Dept: FAMILY MEDICINE | Facility: CLINIC | Age: 67
End: 2017-04-05

## 2017-04-05 ENCOUNTER — TELEPHONE (OUTPATIENT)
Dept: EDUCATION SERVICES | Facility: CLINIC | Age: 67
End: 2017-04-05

## 2017-04-05 NOTE — TELEPHONE ENCOUNTER
Diabetes Follow-up    Subjective/Objective:    Felipe Campbell sent in blood glucose log for review. Last date of communication was: 3/30/17.    Diabetes is being managed with Lifestyle (diet/activity), Oral Medications: Metformin - Dose: 1000 mg bid, Injectable Medications: Lantus 0-0-0-10 and NovoLog Insulin 4-3-3-0    BG/Food Log:   Jennifer,    You are not a member of my care team via [employer], hence I'm sending my glucose readings to you via the [employer]2 e-mail address.  The PDF file is attached.  Let me know if you cannot access the information.    Thanks,  Leonid Campbell  [phone]      Assessment:    Fasting blood glucose: 83% in target.    Before lunch glucose: 60% in target.  Before dinner glucose: 60% in target.  Bedtime glucose: 60% in target.    Plan/Response:  BG values are much improved and no recorded low BG values.  Not sure if patient only stopped his glyburide or if he also has decreased his insulin doses.  Will send him a return email to assess that.     Email back to patient from educator.    Chucky Jaquez.  Thank you for sending in your blood glucose values for review.  These look much better.  No low blood glucose values and most of your values in goal range.  I did want to check with you to see if you only stopped the glyburide or if you have also adjusted any of your insulin doses down?  Over all how is it going?  I did try to call you but was only able to leave a message.    Thank you,    Ligia Garza RN BSN  CDE    Melissa Garza RN  BSN CDE          Any diabetes medication dose changes were made via the CDE Protocol and Collaborative Practice Agreement with the patient's referring provider.     4/6/17   3:30 pm--- call out to patient.  I was able to leave a messag.  I let him know that I got his message and his blood glucose levels are looking good.  He should continue his same insulin doses and call in his BG values again in a week.    Melissa Garza RN  BSN CDE

## 2017-04-05 NOTE — TELEPHONE ENCOUNTER
Reason for Call:  Form, our goal is to have forms completed with 72 hours, however, some forms may require a visit or additional information.    Type of letter, form or note:  medical    Who is the form from?: Patient    Where did the form come from: Patient or family brought in       What clinic location was the form placed at?: [facility]    Where the form was placed: 's Box    What number is listed as a contact on the form?: pt would like the form mailed       Additional comments:Pt would like form mailed.  Shalom santiago.  Thank you    Call taken on 4/5/2017 at 11:03 AM by Migdalia Sequeira

## 2017-04-06 NOTE — TELEPHONE ENCOUNTER
Patient email:      Jennifer,    I'm sorry I missed your call.     I have only stopped taking the glyburide, which I did on Thursday evening, March 30. I have not adjusted my insulin dosages at all: still at 10 units with the Lantus and 4,3,3 for the Novolog.  Should I consider some adjustment per the glucose readings, or would you prefer that I add some post-meal glucose readings prior to making any adjustments?    Let me know.     Thanks,  Leonid      CDE reply to patient:  Chucky Jaquez,    My name is Vicky and I am helping out with emails/calls today.  I will also forward this message to Jennifer.  Based on your recent blood sugars, I would recommend continuing with the same doses - 10 units Lantus and 4-3-3 units Novolog with meals.  If your numbers start trending lower or higher, let us know and we can discuss adjustments.      Thanks!  Vicky Barrera RD LD CDE

## 2017-04-12 ENCOUNTER — TELEPHONE (OUTPATIENT)
Dept: EDUCATION SERVICES | Facility: OTHER | Age: 67
End: 2017-04-12

## 2017-04-12 NOTE — TELEPHONE ENCOUNTER
Diabetes Follow-up    Subjective/Objective:    Felipe Campbell sent in blood glucose log for review. Last date of communication was: 4/5/17.    Diabetes is being managed with Injectable Medications: Lantus 0-0-10-0 and NovoLog Insulin 4-3-3-0    Elvira Strong is a current copy of my glucose readings.  To my inexperienced eye, they appear to be a bit high, but I will await a reply from you as to whether or not I should adjust my dosages of Lantus and of Novolog.    Thanks.    Leonid Campbell    BG/Food Log:       Assessment:  Blood sugars assessed from 4/6/17 to present:    Fasting blood glucose: 71% in target.    Before lunch glucose: 60% in target.    Before dinner glucose: 16% in target.    Bedtime glucose: 100% in target.    Plan/Response:  Chucky Morin!    My name is Kenya and I am helping with the messages today.  I will inform Ligia of this e-mail as well.    I ve reviewed your recent encounters with diabetes education since last February.  I can see where you are thinking your blood sugars for the past week are a bit higher than they have been in the past.  The highest blood sugars are before dinner.  Before considering changes in your insulin doses, I think there are a couple of things we can look at first:    1)  Are you having an afternoon snack?  If you are, you may want to change the snack to a non-carbohydrate snack, such as raw vegetables, nuts, cheese or a meat stick.    2) Has there been a change in the amount of carbohydrate or food that you are eating at lunch?  If not, it may be helpful to for you to test your blood sugar 2 hours after lunch for the next 3 days and update us with the results.  If there has been no change in your lunch and if you are not having an afternoon snack, then we can look at making a change to your lunch insulin dose.    I ll wait to hear back from you before we move to the next step.    Thanks Kenya Morin RD, LD, CDE  [facility] Diabetes Education  Specialist    Kenya Zuñiga RD, EDEN, OMID      Any diabetes medication dose changes were made via the CDE Protocol and Collaborative Practice Agreement with the patient's primary care provider. A copy of this encounter was shared with the provider.

## 2017-04-19 ENCOUNTER — TELEPHONE (OUTPATIENT)
Dept: EDUCATION SERVICES | Facility: CLINIC | Age: 67
End: 2017-04-19

## 2017-04-19 NOTE — TELEPHONE ENCOUNTER
Diabetes Follow-up    Subjective/Objective:    Felipe Campbell sent in blood glucose log for review. Last date of communication was: 4/12/17    Diabetes is being managed with Lifestyle (diet/activity), Oral Medications: Metformin - Dose: 1000 mg bid,  Injectable Medications: Lantus 0-0-0-10 and NovoLog Insulin 4-3-3-0    BG/Food Log:     Melissa,    Elvira is a newer version of my glucose readings as of this morning, April 19.  My mother has been placed under hospice care, so I have been in [place], [place], at her bedside, since Monday.  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  It appears that things have settled down a bit starting yesterday now that I can more reliably establish mealtimes and take my readings at those times.     Thanks for your continued oversight.    Leonid [email or web address]  [phone]      Pre meal and HS values.  Average blood glucose values are listed in the farthest right shaded column.          Assessment:    Fasting blood glucose: 100% in target.  Before lunch glucose: 71% in target.  Before dinner glucose: 57% in target.  Bedtime glucose: 43% in target.    Plan/Response:    Patient stressed and noted that HS values are above goal range as well as pre supper.  He feels that his schedule is getting more controlled so will not change any insulin doses today.  Email back to patient:      Chucky Jaquez,  I am sorry to hear about your mother's need for hospice care.  This kind of life situation is stressful and can make it harder to control the blood sugar.  Since you feel that things are getting more stable, let's not make any insulin changes today, but keep up the good work and testing.   We can look again next Wed.  Take care,    Melissa Garza RN  BSN CDE      Any diabetes medication dose changes were made via the CDE Protocol and Collaborative Practice Agreement with the patient's referring provider.

## 2017-04-26 ENCOUNTER — VIRTUAL VISIT (OUTPATIENT)
Dept: EDUCATION SERVICES | Facility: CLINIC | Age: 67
End: 2017-04-26
Payer: COMMERCIAL

## 2017-04-26 ENCOUNTER — TELEPHONE (OUTPATIENT)
Dept: EDUCATION SERVICES | Facility: CLINIC | Age: 67
End: 2017-04-26

## 2017-04-26 DIAGNOSIS — E11.42 TYPE 2 DIABETES, CONTROLLED, WITH PERIPHERAL NEUROPATHY (H): Primary | ICD-10-CM

## 2017-04-26 DIAGNOSIS — E11.42 TYPE 2 DIABETES, CONTROLLED, WITH PERIPHERAL NEUROPATHY (H): ICD-10-CM

## 2017-04-26 PROCEDURE — 99207 ZZC NO CHARGE LOS: CPT

## 2017-04-26 NOTE — MR AVS SNAPSHOT
After Visit Summary   4/26/2017    Felipe Campbell    MRN: [id number]           Patient Information     Date Of Birth          1950        Visit Information        Provider Department      4/26/2017 5:00 PM FK DIABETIC ED RESOURCE [facility]        Today's Diagnoses     Type 2 diabetes, controlled, with peripheral neuropathy (H)    -  1       Follow-ups after your visit        Who to contact     If you have questions or need follow up information about today's clinic visit or your schedule please contact [facility] directly at [phone].  Normal or non-critical lab and imaging results will be communicated to you by [employer], letter or phone within 4 business days after the clinic has received the results. If you do not hear from us within 7 days, please contact the clinic through [employer] or phone. If you have a critical or abnormal lab result, we will notify you by phone as soon as possible.  Submit refill requests through [employer] or call your pharmacy and they will forward the refill request to us. Please allow 3 business days for your refill to be completed.          Additional Information About Your Visit        MyChart Information     [employer] gives you secure access to your electronic health record. If you see a primary care provider, you can also send messages to your care team and make appointments. If you have questions, please call your primary care clinic.  If you do not have a primary care provider, please call [phone] and they will assist you.        Care EveryWhere ID     This is your Care EveryWhere ID. This could be used by other organizations to access your [place] medical records  [id number]         Blood Pressure from Last 3 Encounters:   12/06/16 132/72   11/08/16 119/80   06/02/16 137/81    Weight from Last 3 Encounters:   03/30/17 226 lb 8 oz (102.7 kg)   03/23/17 224 lb (101.6 kg)   03/09/17 225 lb (102.1 kg)              Today, you had  the following     No orders found for display         Today's Medication Changes          These changes are accurate as of: 4/26/17  8:07 PM.  If you have any questions, ask your nurse or doctor.               These medicines have changed or have updated prescriptions.        Dose/Directions    insulin aspart 100 UNIT/ML injection   Commonly known as:  NovoLOG FLEXPEN   This may have changed:  additional instructions   Used for:  Type 2 diabetes, controlled, with peripheral neuropathy (H)   Changed by:  Melissa Garza        4 units before breakfast, 3 units before lunch, 4 units before dinner   Quantity:  30 mL   Refills:  1            Where to get your medicines      These medications were sent to [id number] - [place], [place] - [address] AT [place] [address]  [address] [place] [address]     Phone:  [phone]     insulin aspart 100 UNIT/ML injection                Primary Care Provider Office Phone # Fax #    Curt Schneider [phone]       [address] [address]        Thank you!     Thank you for choosing [facility]  for your care. Our goal is always to provide you with excellent care. Hearing back from our patients is one way we can continue to improve our services. Please take a few minutes to complete the written survey that you may receive in the mail after your visit with us. Thank you!             Your Updated Medication List - Protect others around you: Learn how to safely use, store and throw away your medicines at www.disposemymeds.org.          This list is accurate as of: 4/26/17  8:07 PM.  Always use your most recent med list.                   Brand Name Dispense Instructions for use    ACE/ARB NOT PRESCRIBED (INTENTIONAL)      by Other route continuous prn.       aspirin 81 MG tablet      Take 1 tablet by mouth daily.       blood glucose monitoring lancets     1 Box    Use to  test blood sugar 2 times daily or as directed.  Ok to substitute alternative if insurance prefers.       blood glucose monitoring test strip    ACCU-CHEK SMARTVIEW    150 strip    Use to test blood sugar 4-5 times daily or as directed.  Ok to substitute alternative if insurance prefers.       cetirizine 10 MG tablet    zyrTEC     Take 10 mg by mouth daily as needed for allergies       fish oil-omega-3 fatty acids 1000 MG capsule      Take 1 capsule by mouth 2 times daily       GLUCAGON EMERGENCY 1 MG kit   Generic drug:  glucagon     1 mg    Inject 1 mg Subcutaneous once for 1 dose       insulin aspart 100 UNIT/ML injection    NovoLOG FLEXPEN    30 mL    4 units before breakfast, 3 units before lunch, 4 units before dinner       insulin glargine 100 UNIT/ML injection    LANTUS SOLOSTAR    15 mL    Inject 10 Units Subcutaneous At Bedtime       insulin pen needle 31G X 6 MM     100 each    Use 4 daily or as directed.       lisinopril 5 MG tablet    PRINIVIL/ZESTRIL    90 tablet    TAKE 1 TABLET(5 MG) BY MOUTH DAILY       melatonin 1 MG Tabs tablet      Take 2 mg by mouth nightly as needed for sleep       metFORMIN 500 MG tablet    GLUCOPHAGE    360 tablet    TAKE 2 TABLETS BY MOUTH TWICE DAILY WITH MEALS       pravastatin 80 MG tablet    PRAVACHOL    90 tablet    TAKE 1 TABLET(80 MG) BY MOUTH DAILY       VIAGRA 100 MG cap/tab   Generic drug:  sildenafil     8 tablet    TAKE 1 TABLET BY MOUTH DAILY AS NEEDED FOR ERECTILE DYSFUNCTION AT LEAST 30 MINUTES BEFORE INTERCOURSE

## 2017-04-27 NOTE — TELEPHONE ENCOUNTER
Diabetes Follow-up    Subjective/Objective:    Felipe Campbell sent in blood glucose log for review. Last date of communication was: 4/19//17.    Diabetes is being managed with Lifestyle (diet/activity), Oral Medications: Metformin - Dose: 1000 mg bid,  Injectable Medications: Lantus 0-0-0-10 and NovoLog Insulin 4-3-3-0.    Melissa,    Attached is a PDF file of my glucose readings current to today, 4/26/2017.  I am back in [place] awaiting any changes in my mother's condition.  She has stabilized somewhat, but is very weak and sleeps most of the time.  My meals were somewhat irregular in timing due to the situation at hand last week as well as traveling back to [place] on Monday, the 24th.  I also missed a reading prior to my evening meal on April 22 as my meter indicated that the batteries were low.  I replaced the batteries later that day and now carry a spare pair in my kit, but I was surprised by the short life-span of the batteries.  I never had to replace the batteries in my old meter during the 8+ years that I used it.    Thanks,  Leonid [email or web address]  [phone]  BG/Food Log:       Assessment:  Evaluation of 4/19/17 - 4/26/17    Fasting blood glucose: 88% in target.    Before lunch glucose: 83% in target..  Before dinner glucose: 83% in target.  Bedtime glucose: 43% in target.    Plan/Response:  Patient doing well.  HS values highest of the day.  Can benefit from small increase in pre supper Novolog from 3 units up to 4 units.  No schedule necessary for meals but best if they are 4-6 hours apart.    Email back to patient:    Chucky Jaquez,  I am glad to hear you are back in town but sorry that your mother continues to not do well.   Thank you for sending your values for review.  All is looking in goal with exception of your bedtime levels that are trending to be above goal range.  I would recommend that you increase your pre supper Novolog dose up by 1 unit.... From 3 units to 4 units so that your  bedtime levels will be lower.  It is not necessary that you have a consistent meal schedule with this kind of insulin regime.  It is recommended however that you spread your meals out 4-6 hours, but take your Novolog 15 minutes before you plan to eat.  Being able to take the Novolog before your meal is what helps you have more flexibility in your day.  Again, you may need less insulin before supper if you will be active in the evening after supper.  Keep up the good work.  Next week, I think I can work out a correction scale for you to use if you are above goal range.   Take care.    Melissa Garza RN  BSN CDE      Any diabetes medication dose changes were made via the CDE Protocol and Collaborative Practice Agreement with the patient's referring provider.

## 2017-05-03 ENCOUNTER — TELEPHONE (OUTPATIENT)
Dept: EDUCATION SERVICES | Facility: CLINIC | Age: 67
End: 2017-05-03

## 2017-05-03 NOTE — TELEPHONE ENCOUNTER
Diabetes Follow-up    Subjective/Objective:    Felipe KAYE Campbell sent in blood glucose log for review. Last date of communication was: 4/26/17.    Diabetes is being managed with Lifestyle (diet/activity), Oral Medications: Metformin - Dose: 1000 mg bid,  Injectable Medications: Lantus 0-0-0-10 and NovoLog Insulin 4-3-4-0.  TDD is 21 units per day, which is 0.2 u/kg of insulin.    BG/Food Log:   Melissa:    Attached is a PDF document with my glucose readings current to this afternoon, Wednesday, May 3.  There were a couple of outliers due to miscalculating carb intake, and the low readings on Saturday, April 29, were due to particularly strenuous spring yard clean-up and the expansion of a flower bed.  I did increase the Novolog dose to 4 units prior to my evening meals.  Please let me know if you have any other comments or suggestions.        Thanks,  Leonid Campbell    Assessment:    Fasting blood glucose: 75% in target.  Before lunch glucose: 88% in target.  Before dinner glucose: 71% in target.  Bedtime glucose: 43% in target.    Plan/Response:  Overall 69 % in goal range.  BG lower on one day with increased activity.  Patient can benefit from development of insulin to CHO ratio and correction factor.  For correction factor would recommend 1/90/190 pre meal.  To develop insulin to CHO ratio would recommend that patient test 2 hours after the start of 1-2 meals per day and record the number of CHO choices that he ate at that meal.  Patient to send in his blood glucose values for review again next Wed.    Email back to patient:    Chucky Jaquez,   Thank you for sending in your BG values for review.  You are doing quite well.  It can be challenging to count carb accurately but you will get it down pat soon.    I think it would be beneficial for you to have an established insulin to carb ratio so you would have more flexibility in what you eat at your meals.  To develop this, if you would test your blood glucose before meals  and bedtime as well as 2 hours after the start of 1-2 meals per day and record the number of carb choices that you ate at that meal.  If you can do this for about 5-6 days and include all of your meals. Please include these in the values that you send in next Wed for review.    For the correction factor, I would use this only pre meal.  Use Novolog insulin and add the recommended amount to your pre meal dose to correct the elevation in BG.    If your BG is 190-280 before a meal- add 1 unit  IF your BG is 281-370 before a meal- add 2 units  So far you have not had a use for this.      Activity generally reduces your BG, so if you are going to be active within 2 hours after your meal, you should reduce your pre meal insulin dose. For 30-60 minutes of light activity with a BG above 120 mg/dl,- decrease by 1 unit. For more intense activity or longer duration you may need to decrease more like 2-3 units.   This can take some time to develop what works for you.  You can also eat a 1 CHO snack instead before or during the activity as needed.    Take care,    Melissa Garza RN  BSN CDE      Any diabetes medication dose changes were made via the CDE Protocol and Collaborative Practice Agreement with the patient's referring provider. A copy of this encounter was shared with the provider.

## 2017-05-10 ENCOUNTER — TELEPHONE (OUTPATIENT)
Dept: FAMILY MEDICINE | Facility: CLINIC | Age: 67
End: 2017-05-10

## 2017-05-10 ENCOUNTER — TELEPHONE (OUTPATIENT)
Dept: EDUCATION SERVICES | Facility: CLINIC | Age: 67
End: 2017-05-10

## 2017-05-10 DIAGNOSIS — E11.42 TYPE 2 DIABETES MELLITUS WITH DIABETIC POLYNEUROPATHY, WITH LONG-TERM CURRENT USE OF INSULIN (H): ICD-10-CM

## 2017-05-10 DIAGNOSIS — E11.42 TYPE 2 DIABETES, CONTROLLED, WITH PERIPHERAL NEUROPATHY (H): ICD-10-CM

## 2017-05-10 DIAGNOSIS — Z79.4 TYPE 2 DIABETES MELLITUS WITH DIABETIC POLYNEUROPATHY, WITH LONG-TERM CURRENT USE OF INSULIN (H): ICD-10-CM

## 2017-05-10 NOTE — TELEPHONE ENCOUNTER
"E-mail from patient:  From: LEONID CAMPBELL [[email or web address]   Sent: Wednesday, May 10, 2017 2:25 PM  To: DEPT-CLINICS-DIABETIC-EDUCATION  Subject: SECURE Attention Melissa Garza: Leonid Campbell Glucose Readings    Melissa,    Attached is a PDF of my last 2 weeks of glucose readings.  I did begin to check a few of my \"after\" readings to begin to consider altering my Novolog injections to correspond to carb intake.  It's still a work in progress, but I think that I will be able to come up with a better estimate after another week or so of checking the before and after readings for more of the meals.      Let me know if you have any concerns or other comments.        Thanks,  Leonid Campbell    "

## 2017-05-10 NOTE — TELEPHONE ENCOUNTER
Reason for call: Other   Patient called regarding (reason for call): prescription  Additional comments: Pharm needs max daily dose for ins. Of insulin aspart (NOVOLOG FLEXPEN)    Phone Number Pt can be reached at: Other phone number:  [phone]  Best Time: Anytime  Can we leave a detailed message on this number? YES

## 2017-05-10 NOTE — TELEPHONE ENCOUNTER
Diabetes Follow-up    Subjective/Objective:    Felipe Campbell sent in blood glucose log for review. Last date of communication was: 5/3/17.    Diabetes is being managed with Lifestyle (diet/activity), Oral Medications: Metformin - Dose: 1000 mg bid,  Injectable Medications: Lantus 0-0-0-10 and NovoLog Insulin 4-3-4-0    BG/Food Log:   See below    Assessment:    Fasting blood glucose: 80% in target.  After breakfast glucose: 100% in target.  Before lunch glucose: 93% in target.  After lunch glucose: 100% in target.  Before dinner glucose: 73% in target.  After dinner glucose: 100% in target.  Bedtime glucose: 47% in target.    Plan/Response:  See Patient Instructions for co-developed, patient-stated behavior change goals.  One episode of hypoglycemia noted pre lunch.  Looks like patient may need 1 unit per CHO choice for breakfast + 1 unit, for lunch and supper 1 unit per CHO choice.  No changes in the patient's current treatment plan.  Will further evaluate patient blood glucose levels for insulin to CHO ratio next week before making a recommendation.  Doing well.    Email back to patient:    Hi Leonid,    Thank you for sending in your values for review.  You are doing well.    I agree that it would be better to wait another week before making a recommendation for you for an insulin to CHO ratio.  I like your graph!    Melissa Garza RN  BSN CDE      Any diabetes medication dose changes were made via the CDE Protocol and Collaborative Practice Agreement with the patient's referring provider.

## 2017-05-17 ENCOUNTER — VIRTUAL VISIT (OUTPATIENT)
Dept: EDUCATION SERVICES | Facility: CLINIC | Age: 67
End: 2017-05-17
Payer: COMMERCIAL

## 2017-05-17 ENCOUNTER — TELEPHONE (OUTPATIENT)
Dept: EDUCATION SERVICES | Facility: CLINIC | Age: 67
End: 2017-05-17

## 2017-05-17 DIAGNOSIS — E11.42 TYPE 2 DIABETES MELLITUS WITH DIABETIC POLYNEUROPATHY, WITH LONG-TERM CURRENT USE OF INSULIN (H): ICD-10-CM

## 2017-05-17 DIAGNOSIS — Z79.4 TYPE 2 DIABETES MELLITUS WITH DIABETIC POLYNEUROPATHY, WITH LONG-TERM CURRENT USE OF INSULIN (H): Primary | ICD-10-CM

## 2017-05-17 DIAGNOSIS — Z79.4 TYPE 2 DIABETES MELLITUS WITH DIABETIC POLYNEUROPATHY, WITH LONG-TERM CURRENT USE OF INSULIN (H): ICD-10-CM

## 2017-05-17 DIAGNOSIS — E11.42 TYPE 2 DIABETES MELLITUS WITH DIABETIC POLYNEUROPATHY, WITH LONG-TERM CURRENT USE OF INSULIN (H): Primary | ICD-10-CM

## 2017-05-17 PROCEDURE — 99207 ZZC NO BILLABLE SERVICE THIS VISIT: CPT

## 2017-05-17 NOTE — MR AVS SNAPSHOT
After Visit Summary   5/17/2017    Felipe Campbell    MRN: [id number]           Patient Information     Date Of Birth          1950        Visit Information        Provider Department      5/17/2017 4:00 PM FK DIABETIC ED RESOURCE [facility]        Today's Diagnoses     Type 2 diabetes mellitus with diabetic polyneuropathy, with long-term current use of insulin (H)    -  1       Follow-ups after your visit        Who to contact     If you have questions or need follow up information about today's clinic visit or your schedule please contact [facility] directly at [phone].  Normal or non-critical lab and imaging results will be communicated to you by [employer], letter or phone within 4 business days after the clinic has received the results. If you do not hear from us within 7 days, please contact the clinic through [employer] or phone. If you have a critical or abnormal lab result, we will notify you by phone as soon as possible.  Submit refill requests through [employer] or call your pharmacy and they will forward the refill request to us. Please allow 3 business days for your refill to be completed.          Additional Information About Your Visit        MyChart Information     [employer] gives you secure access to your electronic health record. If you see a primary care provider, you can also send messages to your care team and make appointments. If you have questions, please call your primary care clinic.  If you do not have a primary care provider, please call [phone] and they will assist you.        Care EveryWhere ID     This is your Care EveryWhere ID. This could be used by other organizations to access your [place] medical records  [id number]         Blood Pressure from Last 3 Encounters:   12/06/16 132/72   11/08/16 119/80   06/02/16 137/81    Weight from Last 3 Encounters:   03/30/17 226 lb 8 oz (102.7 kg)   03/23/17 224 lb (101.6 kg)   03/09/17 225 lb  (102.1 kg)              Today, you had the following     No orders found for display         Today's Medication Changes          These changes are accurate as of: 5/17/17 11:59 PM.  If you have any questions, ask your nurse or doctor.               These medicines have changed or have updated prescriptions.        Dose/Directions    insulin aspart 100 UNIT/ML injection   Commonly known as:  NovoLOG FLEXPEN   This may have changed:  additional instructions   Used for:  Type 2 diabetes mellitus with diabetic polyneuropathy, with long-term current use of insulin (H)   Changed by:  Melissa Garza        4 units before breakfast, 3 units before lunch, 4 units before dinner.   (Breakfast and  Supper take 1 unit per carb choice +1 unit of Novolog.  For Lunch take 1 unit per CHO choice.)   Quantity:  30 mL   Refills:  1            Where to get your medicines      Some of these will need a paper prescription and others can be bought over the counter.  Ask your nurse if you have questions.     Bring a paper prescription for each of these medications     insulin aspart 100 UNIT/ML injection                Primary Care Provider Office Phone # Fax #    Curt Schneider [phone]       [address] [address]        Thank you!     Thank you for choosing [facility]  for your care. Our goal is always to provide you with excellent care. Hearing back from our patients is one way we can continue to improve our services. Please take a few minutes to complete the written survey that you may receive in the mail after your visit with us. Thank you!             Your Updated Medication List - Protect others around you: Learn how to safely use, store and throw away your medicines at www.disposemymeds.org.          This list is accurate as of: 5/17/17 11:59 PM.  Always use your most recent med list.                   Brand Name Dispense Instructions for use    ACE/ARB  NOT PRESCRIBED (INTENTIONAL)      by Other route continuous prn.       aspirin 81 MG tablet      Take 1 tablet by mouth daily.       blood glucose monitoring lancets     1 Box    Use to test blood sugar 2 times daily or as directed.  Ok to substitute alternative if insurance prefers.       blood glucose monitoring test strip    ACCU-CHEK SMARTVIEW    150 strip    Use to test blood sugar 4-5 times daily or as directed.  Ok to substitute alternative if insurance prefers.       cetirizine 10 MG tablet    zyrTEC     Take 10 mg by mouth daily as needed for allergies       fish oil-omega-3 fatty acids 1000 MG capsule      Take 1 capsule by mouth 2 times daily       GLUCAGON EMERGENCY 1 MG kit   Generic drug:  glucagon     1 mg    Inject 1 mg Subcutaneous once for 1 dose       insulin aspart 100 UNIT/ML injection    NovoLOG FLEXPEN    30 mL    4 units before breakfast, 3 units before lunch, 4 units before dinner.   (Breakfast and  Supper take 1 unit per carb choice +1 unit of Novolog.  For Lunch take 1 unit per CHO choice.)       insulin glargine 100 UNIT/ML injection    LANTUS SOLOSTAR    15 mL    Inject 10 Units Subcutaneous At Bedtime       insulin pen needle 31G X 6 MM     100 each    Use 4 daily or as directed.       lisinopril 5 MG tablet    PRINIVIL/ZESTRIL    90 tablet    TAKE 1 TABLET(5 MG) BY MOUTH DAILY       melatonin 1 MG Tabs tablet      Take 2 mg by mouth nightly as needed for sleep       metFORMIN 500 MG tablet    GLUCOPHAGE    360 tablet    TAKE 2 TABLETS BY MOUTH TWICE DAILY WITH MEALS       pravastatin 80 MG tablet    PRAVACHOL    90 tablet    TAKE 1 TABLET(80 MG) BY MOUTH DAILY       VIAGRA 100 MG cap/tab   Generic drug:  sildenafil     8 tablet    TAKE 1 TABLET BY MOUTH DAILY AS NEEDED FOR ERECTILE DYSFUNCTION AT LEAST 30 MINUTES BEFORE INTERCOURSE

## 2017-05-23 ENCOUNTER — TELEPHONE (OUTPATIENT)
Dept: PHARMACY | Facility: CLINIC | Age: 67
End: 2017-05-23

## 2017-05-23 DIAGNOSIS — R80.9 MICROALBUMINURIA: ICD-10-CM

## 2017-05-23 DIAGNOSIS — E11.42 TYPE 2 DIABETES, CONTROLLED, WITH PERIPHERAL NEUROPATHY (H): ICD-10-CM

## 2017-05-23 NOTE — TELEPHONE ENCOUNTER
This patient is due for MTM follow-up. I called the patient to schedule an appointment and left a message with the clinic phone number for the patient to call to schedule.    Oniel Guaman PharmD  Medication Therapy Management Provider  Pager (voicemail): [phone]

## 2017-05-24 NOTE — TELEPHONE ENCOUNTER
lisinopril (PRINIVIL/ZESTRIL) 5 MG tablet      Last Written Prescription Date: 3/2/17  Last Fill Quantity: 90, # refills: 0  [facility] Office Visit with [facility] prescribing provider: 12/6/16       Potassium   Date Value Ref Range Status   11/30/2016 4.4 3.4 - 5.3 mmol/L Final     Creatinine   Date Value Ref Range Status   11/30/2016 0.96 0.66 - 1.25 mg/dL Final     BP Readings from Last 3 Encounters:   12/06/16 132/72   11/08/16 119/80   06/02/16 137/81

## 2017-05-25 RX ORDER — LISINOPRIL 5 MG/1
TABLET ORAL
Qty: 90 TABLET | Refills: 0 | Status: SHIPPED | OUTPATIENT
Start: 2017-05-25 | End: 2017-08-25

## 2017-05-25 NOTE — TELEPHONE ENCOUNTER
Prescription approved per [facility] [place].  Neris Chiu, RN - BC

## 2017-05-31 ENCOUNTER — TELEPHONE (OUTPATIENT)
Dept: FAMILY MEDICINE | Facility: CLINIC | Age: 67
End: 2017-05-31

## 2017-05-31 DIAGNOSIS — Z79.4 TYPE 2 DIABETES MELLITUS WITH DIABETIC POLYNEUROPATHY, WITH LONG-TERM CURRENT USE OF INSULIN (H): ICD-10-CM

## 2017-05-31 DIAGNOSIS — E11.42 TYPE 2 DIABETES MELLITUS WITH DIABETIC POLYNEUROPATHY, WITH LONG-TERM CURRENT USE OF INSULIN (H): ICD-10-CM

## 2017-05-31 NOTE — TELEPHONE ENCOUNTER
[facility], phone number [phone] is request max daily dose for Novolog flex pen for insurance billing purposes.

## 2017-06-01 NOTE — TELEPHONE ENCOUNTER
Novolog Flexpen prescription signed by Dr. Schneider and faxed to [facility]'s pharmacy at [phone].  Tita Ortega,

## 2017-06-01 NOTE — TELEPHONE ENCOUNTER
Please print a new prescription to be faxed.  Prescription needs to indicate a MAX daily dose    Pat sig is too long to E-prescribe.  Please print and sign to be faxed

## 2017-06-01 NOTE — TELEPHONE ENCOUNTER
[facility] pharmacy received prescription via fax, however, they are requesting maximum dose written on the prescription.  Please rewrite prescription.  They stated that have asked for this three times.  Once done, I will refax.  Tita Ortega,

## 2017-06-04 DIAGNOSIS — E11.40 TYPE 2 DIABETES MELLITUS WITH DIABETIC NEUROPATHY, WITHOUT LONG-TERM CURRENT USE OF INSULIN (H): ICD-10-CM

## 2017-06-05 NOTE — TELEPHONE ENCOUNTER
metFORMIN (GLUCOPHAGE) 500 MG tablet         Last Written Prescription Date: 3/2/2017  Last Fill Quantity: 360, # refills: 0  [facility] prescribing provider:  12/6/2016        BP Readings from Last 3 Encounters:   12/06/16 132/72   11/08/16 119/80   06/02/16 137/81     Lab Results   Component Value Date    MICROL 215 05/27/2016     Lab Results   Component Value Date    UMALCR 135.22 05/27/2016     Creatinine   Date Value Ref Range Status   11/30/2016 0.96 0.66 - 1.25 mg/dL Final   ]  GFR Estimate   Date Value Ref Range Status   11/30/2016 79 >60 mL/min/1.7m2 Final     Comment:     Non  GFR Calc   05/27/2016 70 >60 mL/min/1.7m2 Final     Comment:     Non  GFR Calc   06/15/2015 80 >60 mL/min/1.7m2 Final     Comment:     Non  GFR Calc     GFR Estimate If Black   Date Value Ref Range Status   11/30/2016 >90   GFR Calc   >60 mL/min/1.7m2 Final   05/27/2016 85 >60 mL/min/1.7m2 Final     Comment:      GFR Calc   06/15/2015 >90   GFR Calc   >60 mL/min/1.7m2 Final     Lab Results   Component Value Date    CHOL 183 11/30/2016     Lab Results   Component Value Date    HDL 57 11/30/2016     Lab Results   Component Value Date    LDL 95 11/30/2016     Lab Results   Component Value Date    TRIG 154 11/30/2016     Lab Results   Component Value Date    CHOLHDLRATIO 2.7 06/15/2015     Lab Results   Component Value Date    AST 16 11/30/2016     Lab Results   Component Value Date    ALT 26 11/30/2016     Lab Results   Component Value Date    A1C 8.1 12/06/2016    A1C 7.2 05/27/2016    A1C 6.9 12/10/2015    A1C 7.4 05/19/2015    A1C 7.3 12/29/2014     Potassium   Date Value Ref Range Status   11/30/2016 4.4 3.4 - 5.3 mmol/L Final

## 2017-06-06 NOTE — TELEPHONE ENCOUNTER
Needs appointment for diabetic recheck for further refills.  Please schedule then return to refills.  Carmen Powell RN    Lab Results   Component Value Date    A1C 8.1 12/06/2016    A1C 7.2 05/27/2016    A1C 6.9 12/10/2015    A1C 7.4 05/19/2015    A1C 7.3 12/29/2014

## 2017-06-07 NOTE — TELEPHONE ENCOUNTER
Made appointment for next Tuesday with Dr. Schneider.     Eva TERESA CMA ([facility])

## 2017-06-07 NOTE — TELEPHONE ENCOUNTER
Medication is being filled for 1 time refill only due to:  pt has a future appointment      Signed Prescriptions:                        Disp   Refills    metFORMIN (GLUCOPHAGE) 500 MG tablet       360 ta*0        Sig: TAKE 2 TABLETS BY MOUTH TWICE DAILY WITH MEALS  Authorizing Provider: SEBASTIAN DAMON  Ordering User: STACI TA, RN, BSN

## 2017-06-08 NOTE — PROGRESS NOTES
SUBJECTIVE:                                                    Felipe Campbell is a 67 year old male who presents to clinic today for the following health issues:    Diabetes Follow-up      Patient is checking blood sugars: 4 times a day    Diabetic concerns: None and other - none     Symptoms of hypoglycemia (low blood sugar): none     Paresthesias (numbness or burning in feet) or sores: Yes Mild     Date of last diabetic eye exam: Is due    -- Patient states he is feeling generally well and he is retiring soon. He notes he has lost about 15 pounds, which he attributes to insulin and physical activity. He reports he wore a continuous glucose monitor last fall. His glucose levels have not been dropping as low recently and he has been sending his readings to Jennifer Garza, his diabetes educator. He notes his neuropathy has not gotten worse.  Lab Results   Component Value Date    A1C 6.8 06/13/2017    A1C 8.1 12/06/2016    A1C 7.2 05/27/2016    A1C 6.9 12/10/2015    A1C 7.4 05/19/2015        Knee Pain --  Patient states he has constant right knee pain on the medial joint line. He notes the pain was onset about one month ago. Denies any popping sounds in knee.    Hyperlipidemia Follow-Up      Rate your low fat/cholesterol diet?: good    Taking statin?  Yes, no muscle aches from statin    Other lipid medications/supplements?:  None    Recent Labs   Lab Test  11/30/16   0732  05/27/16   0801  06/15/15   0741   03/14/14   0726   CHOL  183  155  156   --   213*   HDL  57  54  58   --   44   LDL  95  74  75   < >  119   TRIG  154*  136  115   --   248*   CHOLHDLRATIO   --    --   2.7   --   4.9    < > = values in this interval not displayed.        Blood Pressure Check      Outpatient blood pressures are not being checked.    Low Salt Diet: low salt    BP Readings from Last 3 Encounters:   06/13/17 128/66   12/06/16 132/72   11/08/16 119/80          Amount of exercise or physical activity: 2-3 days/week for an average of  45-60 minutes    Problems taking medications regularly: No    Medication side effects: none    Diet: regular (no restrictions) and diabetic    Problem list and histories reviewed & adjusted, as indicated.  Additional history: as documented    Patient Active Problem List   Diagnosis     Erectile dysfunction of organic origin     HYPERLIPIDEMIA LDL GOAL <100     Microalbuminuria     Advanced directives, counseling/discussion     Hayfever     Type 2 diabetes mellitus with diabetic polyneuropathy, with long-term current use of insulin (H)     CKD (chronic kidney disease) stage 2, GFR 60-89 ml/min     Past Surgical History:   Procedure Laterality Date     C APPENDECTOMY  07/20/69     COLONOSCOPY WITH CO2 INSUFFLATION N/A 11/8/2016    Procedure: COLONOSCOPY WITH CO2 INSUFFLATION;  Surgeon: Torey Almazan MD;  Location: [facility] OR     HERNIA REPAIR, INGUINAL RT/LT  age 5    left     TUNA         Social History   Substance Use Topics     Smoking status: Former Smoker     Types: Cigarettes     Quit date: 1/1/1979     Smokeless tobacco: Never Used     Alcohol use Yes      Comment: occasionally     Family History   Problem Relation Age of Onset     Breast Cancer Mother      DIABETES Father      DIABETES Maternal Grandmother      Congenital Anomalies Sister      Psychotic Disorder Daughter          Current Outpatient Prescriptions   Medication Sig Dispense Refill     pravastatin (PRAVACHOL) 80 MG tablet TAKE 1 TABLET(80 MG) BY MOUTH DAILY 90 tablet 1     metFORMIN (GLUCOPHAGE) 500 MG tablet TAKE 2 TABLETS BY MOUTH TWICE DAILY WITH MEALS 360 tablet 0     insulin aspart (NOVOLOG FLEXPEN) 100 UNIT/ML injection 4 units before breakfast, 3 units before lunch, 4 units before dinner. (Breakfast and Supper take 1 unit per carb choice +1 unit of Novolog.  For Lunch take 1 unit per CHO choice.) maximum units are 25 units per day in total 30 mL 1     lisinopril (PRINIVIL/ZESTRIL) 5 MG tablet TAKE 1 TABLET(5 MG) BY MOUTH DAILY 90 tablet  0     blood glucose monitoring (ACCU-CHEK SMARTVIEW) test strip Use to test blood sugar 4-5 times daily or as directed.  Ok to substitute alternative if insurance prefers. 150 strip 2     insulin glargine (LANTUS SOLOSTAR) 100 UNIT/ML injection Inject 10 Units Subcutaneous At Bedtime 15 mL 1     insulin pen needle 31G X 6 MM Use 4 daily or as directed. 100 each prn     VIAGRA 100 MG cap/tab TAKE 1 TABLET BY MOUTH DAILY AS NEEDED FOR ERECTILE DYSFUNCTION AT LEAST 30 MINUTES BEFORE INTERCOURSE 8 tablet 0     blood glucose monitoring (ACCU-CHEK FASTCLIX) lancets Use to test blood sugar 2 times daily or as directed.  Ok to substitute alternative if insurance prefers. 1 Box 1     melatonin 1 MG TABS Take 2 mg by mouth nightly as needed for sleep       cetirizine (ZYRTEC) 10 MG tablet Take 10 mg by mouth daily as needed for allergies       aspirin 81 MG tablet Take 1 tablet by mouth daily.  3     GLUCAGON EMERGENCY 1 MG kit Inject 1 mg Subcutaneous once for 1 dose 1 mg 1     [DISCONTINUED] pravastatin (PRAVACHOL) 80 MG tablet TAKE 1 TABLET(80 MG) BY MOUTH DAILY 90 tablet 1     fish oil-omega-3 fatty acids 1000 MG capsule Take 1 capsule by mouth 2 times daily       ACE/ARB NOT PRESCRIBED, INTENTIONAL, by Other route continuous prn.       Labs reviewed in EPIC    Reviewed and updated as needed this visit by clinical staff  Tobacco  Allergies  Meds  Med Hx  Surg Hx  Fam Hx  Soc Hx      Reviewed and updated as needed this visit by Provider         ROS:  Constitutional, HEENT, cardiovascular, pulmonary, GI, , musculoskeletal, neuro, skin, endocrine and psych systems are negative, except as otherwise noted.    This document serves as a record of the services and decisions personally performed and made by Curt Schneider MD. It was created on their behalf by Noah Zazueta, a trained medical scribe. The creation of this document is based the provider's statements to the medical scribe.  Noah Zazueta June 13, 2017 10:56  "AM    OBJECTIVE:                                                    /66  Pulse 76  Temp 98.6  F (37  C) (Oral)  Ht 1.918 m (6' 3.5\")  Wt 94.8 kg (209 lb)  SpO2 99%  BMI 25.78 kg/m2  Body mass index is 25.78 kg/(m^2).  GENERAL: healthy, alert and no distress  EYES: Eyes grossly normal to inspection, PERRL and conjunctivae and sclerae normal  RESP: lungs clear to auscultation - no rales, rhonchi or wheezes  CV: regular rate and rhythm, normal S1 S2, no S3 or S4, no murmur, click or rub, no peripheral edema and peripheral pulses strong  MS: no gross musculoskeletal defects noted, trace bilateral lower extremity edema  SKIN: no suspicious lesions or rashes to visible skin  NEURO: mentation intact and speech normal  PSYCH: mentation appears normal, affect normal/bright    Diagnostic Test Results:  Results for orders placed or performed in visit on 06/13/17   Hemoglobin A1c   Result Value Ref Range    Hemoglobin A1C 6.8 (H) 4.3 - 6.0 %        ASSESSMENT/PLAN:                                                      (E11.42,  Z79.4) Type 2 diabetes mellitus with diabetic polyneuropathy, with long-term current use of insulin (H)  (primary encounter diagnosis)  Comment: this is a very nice gentleman who I have no bad news for ! His diabetes mellitus seems well controlled and the current insulin regimen is working well. Refills provided for 6 months   Plan: Hemoglobin A1c, Albumin Random Urine         Quantitative, Basic metabolic panel            (R80.9) Microalbuminuria  Comment: elevated urine microalbumin level is a minor finding that should be repeated annually  Plan: as above     (E78.5) Hyperlipidemia LDL goal <100  Comment:   Lab Results   Component Value Date    CHOL 183 11/30/2016     Lab Results   Component Value Date    HDL 57 11/30/2016     Lab Results   Component Value Date    LDL 95 11/30/2016     Lab Results   Component Value Date    TRIG 154 11/30/2016     Lab Results   Component Value Date    " CHOLHDLRATIO 2.7 06/15/2015       Plan: pravastatin (PRAVACHOL) 80 MG tablet        Recheck fasting lipid panel in 6 months     (Z13.89) Screening for diabetic peripheral neuropathy  Comment: positive diabetes neuropathy findings , no ulceration   Plan: FOOT EXAM  NO CHARGE [92875.114]            (M25.561) Acute pain of right knee  Comment: an unrelated matter . Seems a benign and self-limited problem but we agreed to check knee xrays and further follow up depending on how things go   Plan: XR Knee Right 1/2 Views              Patient Instructions     - If you need a refill, ask the pharmacist to fax a request to me.    - Follow up with me in 6 months.     - I will follow up with you about lab results.     [facility]3]    If you have any questions regarding to your visit please contact your care team:     Team Pink:   Clinic Hours Telephone Number   Internal Medicine:  Dr. Michelle Gil, NP       7am-7pm  Monday - Thursday   7am-5pm  Fridays  [phone]  (Appointment scheduling available 24/7)    Questions about your visit?  Team Line  [phone]   Urgent Care - [place] and [place] - 11am-9pm Monday-Friday Saturday-Sunday- 9am-5pm   [place] - 5pm-9pm Monday-Friday Saturday-Sunday- 9am-5pm  [phone] - [place]   [phone] - [place]       What options do I have for visits at the clinic other than the traditional office visit?  To expand how we care for you, many of our providers are utilizing electronic visits (e-visits) and telephone visits, when medically appropriate, for interactions with their patients rather than a visit in the clinic.   We also offer nurse visits for many medical concerns. Just like any other service, we will bill your insurance company for this type of visit based on time spent on the phone with your provider. Not all insurance companies cover these visits. Please check with your medical insurance if this type  of visit is covered. You will be responsible for any charges that are not paid by your insurance.      E-visits via [email or web address]:  generally incur a $35.00 fee.  Telephone visits:  Time spent on the phone: *charged based on time that is spent on the phone in increments of 10 minutes. Estimated cost:   5-10 mins $30.00   11-20 mins. $59.00   21-30 mins. $85.00   Use Sharethrought (secure email communication and access to your chart) to send your primary care provider a message or make an appointment. Ask someone on your Team how to sign up for SpeakWorks.    For a Price Quote for your services, please call our Consumer Price Line at [phone].    As always, Thank you for trusting us with your health care needs!    Discharged By:  GREG OROZCO      The information in this document, created by the medical scribe for me, accurately reflects the services I personally performed and the decisions made by me. I have reviewed and approved this document for accuracy.   MD Curt Moncada MD  [facility]

## 2017-06-13 ENCOUNTER — RADIANT APPOINTMENT (OUTPATIENT)
Dept: GENERAL RADIOLOGY | Facility: CLINIC | Age: 67
End: 2017-06-13
Attending: INTERNAL MEDICINE
Payer: COMMERCIAL

## 2017-06-13 ENCOUNTER — OFFICE VISIT (OUTPATIENT)
Dept: FAMILY MEDICINE | Facility: CLINIC | Age: 67
End: 2017-06-13
Payer: COMMERCIAL

## 2017-06-13 VITALS
SYSTOLIC BLOOD PRESSURE: 128 MMHG | BODY MASS INDEX: 25.45 KG/M2 | HEIGHT: 76 IN | HEART RATE: 76 BPM | TEMPERATURE: 98.6 F | OXYGEN SATURATION: 99 % | WEIGHT: 209 LBS | DIASTOLIC BLOOD PRESSURE: 66 MMHG

## 2017-06-13 DIAGNOSIS — E78.5 HYPERLIPIDEMIA LDL GOAL <100: ICD-10-CM

## 2017-06-13 DIAGNOSIS — R80.9 MICROALBUMINURIA: ICD-10-CM

## 2017-06-13 DIAGNOSIS — E11.42 TYPE 2 DIABETES MELLITUS WITH DIABETIC POLYNEUROPATHY, WITH LONG-TERM CURRENT USE OF INSULIN (H): Primary | ICD-10-CM

## 2017-06-13 DIAGNOSIS — M25.561 ACUTE PAIN OF RIGHT KNEE: ICD-10-CM

## 2017-06-13 DIAGNOSIS — Z79.4 TYPE 2 DIABETES MELLITUS WITH DIABETIC POLYNEUROPATHY, WITH LONG-TERM CURRENT USE OF INSULIN (H): Primary | ICD-10-CM

## 2017-06-13 DIAGNOSIS — Z13.89 SCREENING FOR DIABETIC PERIPHERAL NEUROPATHY: ICD-10-CM

## 2017-06-13 LAB
ANION GAP SERPL CALCULATED.3IONS-SCNC: 10 MMOL/L (ref 3–14)
BUN SERPL-MCNC: 19 MG/DL (ref 7–30)
CALCIUM SERPL-MCNC: 9.5 MG/DL (ref 8.5–10.1)
CHLORIDE SERPL-SCNC: 102 MMOL/L (ref 94–109)
CO2 SERPL-SCNC: 25 MMOL/L (ref 20–32)
CREAT SERPL-MCNC: 0.96 MG/DL (ref 0.66–1.25)
GFR SERPL CREATININE-BSD FRML MDRD: 79 ML/MIN/1.7M2
GLUCOSE SERPL-MCNC: 99 MG/DL (ref 70–99)
HBA1C MFR BLD: 6.8 % (ref 4.3–6)
POTASSIUM SERPL-SCNC: 4 MMOL/L (ref 3.4–5.3)
SODIUM SERPL-SCNC: 137 MMOL/L (ref 133–144)

## 2017-06-13 PROCEDURE — 82043 UR ALBUMIN QUANTITATIVE: CPT | Performed by: INTERNAL MEDICINE

## 2017-06-13 PROCEDURE — 99207 C FOOT EXAM  NO CHARGE: CPT | Performed by: INTERNAL MEDICINE

## 2017-06-13 PROCEDURE — 99214 OFFICE O/P EST MOD 30 MIN: CPT | Performed by: INTERNAL MEDICINE

## 2017-06-13 PROCEDURE — 36415 COLL VENOUS BLD VENIPUNCTURE: CPT | Performed by: INTERNAL MEDICINE

## 2017-06-13 PROCEDURE — 73560 X-RAY EXAM OF KNEE 1 OR 2: CPT | Mod: RT

## 2017-06-13 PROCEDURE — 80048 BASIC METABOLIC PNL TOTAL CA: CPT | Performed by: INTERNAL MEDICINE

## 2017-06-13 PROCEDURE — 83036 HEMOGLOBIN GLYCOSYLATED A1C: CPT | Performed by: INTERNAL MEDICINE

## 2017-06-13 RX ORDER — IBUPROFEN 600 MG/1
1 TABLET ORAL ONCE
Qty: 1 MG | Refills: 0 | Status: CANCELLED | OUTPATIENT
Start: 2017-06-13 | End: 2017-06-13

## 2017-06-13 RX ORDER — LISINOPRIL 5 MG/1
TABLET ORAL
Qty: 90 TABLET | Refills: 0 | Status: CANCELLED | OUTPATIENT
Start: 2017-06-13

## 2017-06-13 RX ORDER — PRAVASTATIN SODIUM 80 MG/1
TABLET ORAL
Qty: 90 TABLET | Refills: 1 | Status: SHIPPED | OUTPATIENT
Start: 2017-06-13 | End: 2018-01-14

## 2017-06-13 RX ORDER — PRAVASTATIN SODIUM 80 MG/1
TABLET ORAL
Qty: 90 TABLET | Refills: 1 | Status: CANCELLED | OUTPATIENT
Start: 2017-06-13

## 2017-06-13 ASSESSMENT — PAIN SCALES - GENERAL: PAINLEVEL: NO PAIN (0)

## 2017-06-13 NOTE — LETTER
[address] [place] [address]    Kerrie 15, 2017    Felipe Campbell  [address]  [place] [address]          Dear Felipe,    Enclosed is a copy of your results. Things look good ! Take care Leonid, and please let me know if you have any questions about all of these lab tests, see you in 6 months.     Results for orders placed or performed in visit on 06/13/17   Hemoglobin A1c   Result Value Ref Range    Hemoglobin A1C 6.8 (H) 4.3 - 6.0 %   Albumin Random Urine Quantitative   Result Value Ref Range    Creatinine Urine 134 mg/dL    Albumin Urine mg/L 47 mg/L    Albumin Urine mg/g Cr 35.22 (H) 0 - 17 mg/g Cr   Basic metabolic panel   Result Value Ref Range    Sodium 137 133 - 144 mmol/L    Potassium 4.0 3.4 - 5.3 mmol/L    Chloride 102 94 - 109 mmol/L    Carbon Dioxide 25 20 - 32 mmol/L    Anion Gap 10 3 - 14 mmol/L    Glucose 99 70 - 99 mg/dL    Urea Nitrogen 19 7 - 30 mg/dL    Creatinine 0.96 0.66 - 1.25 mg/dL    GFR Estimate 79 >60 mL/min/1.7m2    GFR Estimate If Black >90   GFR Calc   >60 mL/min/1.7m2    Calcium 9.5 8.5 - 10.1 mg/dL     Sincerely,      Curt Schneider MD/warner

## 2017-06-13 NOTE — MR AVS SNAPSHOT
After Visit Summary   6/13/2017    Felipe Campbell    MRN: [id number]           Patient Information     Date Of Birth          1950        Visit Information        Provider Department      6/13/2017 10:30 AM Curt Schneider MD [facility]        Today's Diagnoses     Type 2 diabetes mellitus with diabetic polyneuropathy, with long-term current use of insulin (H)    -  1    Microalbuminuria        Hyperlipidemia LDL goal <100        Screening for diabetic peripheral neuropathy        Acute pain of right knee          Care Instructions    - If you need a refill, ask the pharmacist to fax a request to me.    - Follow up with me in 6 months.     - I will follow up with you about lab results.     [facility]    If you have any questions regarding to your visit please contact your care team:     Team Pink:   Clinic Hours Telephone Number   Internal Medicine:  Dr. Michelle Gil NP       7am-7pm  Monday - Thursday   7am-5pm  Fridays  [phone]  (Appointment scheduling available 24/7)    Questions about your visit?  Team Line  [phone]   Urgent Care - [place] and [place] - 11am-9pm Monday-Friday Saturday-Sunday- 9am-5pm   [place] - 5pm-9pm Monday-Friday Saturday-Sunday- 9am-5pm  [phone] - [place]   [phone] - [place]       What options do I have for visits at the clinic other than the traditional office visit?  To expand how we care for you, many of our providers are utilizing electronic visits (e-visits) and telephone visits, when medically appropriate, for interactions with their patients rather than a visit in the clinic.   We also offer nurse visits for many medical concerns. Just like any other service, we will bill your insurance company for this type of visit based on time spent on the phone with your provider. Not all insurance companies cover these visits. Please check with your medical  insurance if this type of visit is covered. You will be responsible for any charges that are not paid by your insurance.      E-visits via SigmaQuesthart:  generally incur a $35.00 fee.  Telephone visits:  Time spent on the phone: *charged based on time that is spent on the phone in increments of 10 minutes. Estimated cost:   5-10 mins $30.00   11-20 mins. $59.00   21-30 mins. $85.00   Use Pionetics (secure email communication and access to your chart) to send your primary care provider a message or make an appointment. Ask someone on your Team how to sign up for Pionetics.    For a Price Quote for your services, please call our [email or web address] Line at [phone].    As always, Thank you for trusting us with your health care needs!    Discharged By:  GREG OROZCO            Follow-ups after your visit        Future tests that were ordered for you today     Open Future Orders        Priority Expected Expires Ordered    XR Knee Right 1/2 Views Routine 6/13/2017 6/13/2018 6/13/2017            Who to contact     If you have questions or need follow up information about today's clinic visit or your schedule please contact [facility] directly at [phone].  Normal or non-critical lab and imaging results will be communicated to you by SigmaQuesthart, letter or phone within 4 business days after the clinic has received the results. If you do not hear from us within 7 days, please contact the clinic through SigmaQuesthart or phone. If you have a critical or abnormal lab result, we will notify you by phone as soon as possible.  Submit refill requests through Pionetics or call your pharmacy and they will forward the refill request to us. Please allow 3 business days for your refill to be completed.          Additional Information About Your Visit        SigmaQuestharOobafit Information     Pionetics gives you secure access to your electronic health record. If you see a primary care provider, you can also send messages to your care team and make  "appointments. If you have questions, please call your primary care clinic.  If you do not have a primary care provider, please call [phone] and they will assist you.        Care EveryWhere ID     This is your Care EveryWhere ID. This could be used by other organizations to access your [place] medical records  [id number]        Your Vitals Were     Pulse Temperature Height Pulse Oximetry BMI (Body Mass Index)       76 98.6  F (37  C) (Oral) 6' 3.5\" (1.918 m) 99% 25.78 kg/m2        Blood Pressure from Last 3 Encounters:   06/13/17 128/66   12/06/16 132/72   11/08/16 119/80    Weight from Last 3 Encounters:   06/13/17 209 lb (94.8 kg)   03/30/17 226 lb 8 oz (102.7 kg)   03/23/17 224 lb (101.6 kg)              We Performed the Following     Albumin Random Urine Quantitative     Basic metabolic panel     FOOT EXAM  NO CHARGE [62319.114]     Hemoglobin A1c          Today's Medication Changes          These changes are accurate as of: 6/13/17 11:25 AM.  If you have any questions, ask your nurse or doctor.               These medicines have changed or have updated prescriptions.        Dose/Directions    pravastatin 80 MG tablet   Commonly known as:  PRAVACHOL   This may have changed:  See the new instructions.   Used for:  Hyperlipidemia LDL goal <100   Changed by:  Curt Schneider MD        TAKE 1 TABLET(80 MG) BY MOUTH DAILY   Quantity:  90 tablet   Refills:  1            Where to get your medicines      These medications were sent to [facility] [id number] [place] [address], [facility] [address]     Phone:  [phone]     pravastatin 80 MG tablet                Primary Care Provider Office Phone # Fax #    Curt Schneider [phone]       [address] [address]        Thank you!     Thank you for choosing [facility]  for your care. Our goal is always to provide you with " excellent care. Hearing back from our patients is one way we can continue to improve our services. Please take a few minutes to complete the written survey that you may receive in the mail after your visit with us. Thank you!             Your Updated Medication List - Protect others around you: Learn how to safely use, store and throw away your medicines at www.disposemymeds.org.          This list is accurate as of: 6/13/17 11:25 AM.  Always use your most recent med list.                   Brand Name Dispense Instructions for use    ACE/ARB NOT PRESCRIBED (INTENTIONAL)      by Other route continuous prn.       aspirin 81 MG tablet      Take 1 tablet by mouth daily.       blood glucose monitoring lancets     1 Box    Use to test blood sugar 2 times daily or as directed.  Ok to substitute alternative if insurance prefers.       blood glucose monitoring test strip    ACCU-CHEK SMARTVIEW    150 strip    Use to test blood sugar 4-5 times daily or as directed.  Ok to substitute alternative if insurance prefers.       cetirizine 10 MG tablet    zyrTEC     Take 10 mg by mouth daily as needed for allergies       fish oil-omega-3 fatty acids 1000 MG capsule      Take 1 capsule by mouth 2 times daily       GLUCAGON EMERGENCY 1 MG kit   Generic drug:  glucagon     1 mg    Inject 1 mg Subcutaneous once for 1 dose       insulin aspart 100 UNIT/ML injection    NovoLOG FLEXPEN    30 mL    4 units before breakfast, 3 units before lunch, 4 units before dinner. (Breakfast and Supper take 1 unit per carb choice +1 unit of Novolog.  For Lunch take 1 unit per CHO choice.) maximum units are 25 units per day in total       insulin glargine 100 UNIT/ML injection    LANTUS SOLOSTAR    15 mL    Inject 10 Units Subcutaneous At Bedtime       insulin pen needle 31G X 6 MM     100 each    Use 4 daily or as directed.       lisinopril 5 MG tablet    PRINIVIL/ZESTRIL    90 tablet    TAKE 1 TABLET(5 MG) BY MOUTH DAILY       melatonin 1 MG Tabs  tablet      Take 2 mg by mouth nightly as needed for sleep       metFORMIN 500 MG tablet    GLUCOPHAGE    360 tablet    TAKE 2 TABLETS BY MOUTH TWICE DAILY WITH MEALS       pravastatin 80 MG tablet    PRAVACHOL    90 tablet    TAKE 1 TABLET(80 MG) BY MOUTH DAILY       VIAGRA 100 MG cap/tab   Generic drug:  sildenafil     8 tablet    TAKE 1 TABLET BY MOUTH DAILY AS NEEDED FOR ERECTILE DYSFUNCTION AT LEAST 30 MINUTES BEFORE INTERCOURSE

## 2017-06-13 NOTE — NURSING NOTE
"Chief Complaint   Patient presents with     Diabetes     follow up        Initial /66  Pulse 76  Temp 98.6  F (37  C) (Oral)  Ht 6' 3.5\" (1.918 m)  Wt 209 lb (94.8 kg)  SpO2 99%  BMI 25.78 kg/m2 Estimated body mass index is 25.78 kg/(m^2) as calculated from the following:    Height as of this encounter: 6' 3.5\" (1.918 m).    Weight as of this encounter: 209 lb (94.8 kg).  Medication Reconciliation: complete   Kerri Zuñiga MA    "

## 2017-06-13 NOTE — PATIENT INSTRUCTIONS
- If you need a refill, ask the pharmacist to fax a request to me.    - Follow up with me in 6 months.     - I will follow up with you about lab results.     [facility]    If you have any questions regarding to your visit please contact your care team:     Team Pink:   Clinic Hours Telephone Number   Internal Medicine:  Dr. Michelle Gil NP       7am-7pm  Monday - Thursday   7am-5pm  Fridays  [phone]  (Appointment scheduling available 24/7)    Questions about your visit?  Team Line  [phone]   Urgent Care - [place] and [place] - 11am-9pm Monday-Friday Saturday-Sunday- 9am-5pm   [place] - 5pm-9pm Monday-Friday Saturday-Sunday- 9am-5pm  [phone] - [place]   [phone] - [place]       What options do I have for visits at the clinic other than the traditional office visit?  To expand how we care for you, many of our providers are utilizing electronic visits (e-visits) and telephone visits, when medically appropriate, for interactions with their patients rather than a visit in the clinic.   We also offer nurse visits for many medical concerns. Just like any other service, we will bill your insurance company for this type of visit based on time spent on the phone with your provider. Not all insurance companies cover these visits. Please check with your medical insurance if this type of visit is covered. You will be responsible for any charges that are not paid by your insurance.      E-visits via Manymoon:  generally incur a $35.00 fee.  Telephone visits:  Time spent on the phone: *charged based on time that is spent on the phone in increments of 10 minutes. Estimated cost:   5-10 mins $30.00   11-20 mins. $59.00   21-30 mins. $85.00   Use Manymoon (secure email communication and access to your chart) to send your primary care provider a message or make an appointment. Ask someone on your Team how to sign up for Manymoon.    For a  Price Quote for your services, please call our Consumer Price Line at [phone].    As always, Thank you for trusting us with your health care needs!    Discharged By:  GREG OROZCO

## 2017-06-14 LAB
CREAT UR-MCNC: 134 MG/DL
MICROALBUMIN UR-MCNC: 47 MG/L
MICROALBUMIN/CREAT UR: 35.22 MG/G CR (ref 0–17)

## 2017-06-15 ENCOUNTER — TELEPHONE (OUTPATIENT)
Dept: EDUCATION SERVICES | Facility: CLINIC | Age: 67
End: 2017-06-15

## 2017-06-15 NOTE — TELEPHONE ENCOUNTER
E-mail from patient:    Johanny Torres.    Attached is a PDF file of my glucose readings since May 17.  I am again back in [place] as my sister and I are in the process of dealing with paperwork and other issues prior to moving my mother to a care home where there are only 5 residents and where she can receive the care that she needs at this time.    The good news is that I met with Dr. Schneider on Tuesday, June 13, and my A1C value is now at 6.8, down from the 8.1 value last December!  :o)    I've not done a very good job of tracking my 2-hour after meal readings due to my schedule and the inconvenience of doing so.  While I continue to have the occasional high readings, I feel like my control of my glucose levels is much better than it was prior to starting the insulin regimen.  I've also lost about 15 pounds since the first of the year, now weighing 209 lbs as of June 13.  My other metabolic readings are all in the normal range.  I'm waiting to hear from Dr. Schneider about my urine test.    Thanks!    Leonid Campbell

## 2017-06-16 NOTE — TELEPHONE ENCOUNTER
Diabetes Follow-up    Subjective/Objective:    Felipe Campbell sent in blood glucose log for review. Last date of communication was: 5/17/17.    Diabetes is being managed with Lifestyle (diet/activity), Oral Medications: Metformin - Dose: 1000 mg bid, Time: breakfast and supper, Injectable Medications: Lantus 0-0-0-10, Novolog  - 1 unit per CHO choice plus 1 unit.  BG/Food Log:   See below.  BG values reviewed.  BG values in goal range with exception of his HS values which are improved since last review but continue to be mostly above goal range.  Patient A1C has improved to 6.8% and patient is pleased with this improvement.    Assessment: for values 6/5-6/14/17    Fasting blood glucose: 66% in target.    Before lunch glucose: 100% in target.  After lunch glucose: 0% in target.  Before dinner glucose: 56% in target.  Bedtime glucose: 22% in target.    Plan/Response:  Patient A1C is in goal range and patient is currently out of town.  Patient may benefit from an increase in his pre supper Novolog dose to 1 unit per CHO choice plus 2 units.  Will have patient send in blood glucose values for review once he is back home and will work on further insulin adjustment.    Email back to patient.    Hi Leonid,  What good news!  I am glad to see that your A1C is so much improved.  You are doing a good job.  It looks like your values are in goal range with the exception of your before bed levels.  We can work on those further once you are back home.  Please send your blood glucose values again in 2 weeks for review.    Take care,  Melissa Garza RN  BSN CDE      Any diabetes medication dose changes were made via the CDE Protocol and Collaborative Practice Agreement with the patient's referring provider.

## 2017-07-05 ENCOUNTER — VIRTUAL VISIT (OUTPATIENT)
Dept: EDUCATION SERVICES | Facility: CLINIC | Age: 67
End: 2017-07-05
Payer: COMMERCIAL

## 2017-07-05 ENCOUNTER — TELEPHONE (OUTPATIENT)
Dept: EDUCATION SERVICES | Facility: CLINIC | Age: 67
End: 2017-07-05

## 2017-07-05 DIAGNOSIS — Z79.4 TYPE 2 DIABETES MELLITUS WITH DIABETIC POLYNEUROPATHY, WITH LONG-TERM CURRENT USE OF INSULIN (H): ICD-10-CM

## 2017-07-05 DIAGNOSIS — E11.42 TYPE 2 DIABETES MELLITUS WITH DIABETIC POLYNEUROPATHY, WITH LONG-TERM CURRENT USE OF INSULIN (H): Primary | ICD-10-CM

## 2017-07-05 DIAGNOSIS — Z79.4 TYPE 2 DIABETES MELLITUS WITH DIABETIC POLYNEUROPATHY, WITH LONG-TERM CURRENT USE OF INSULIN (H): Primary | ICD-10-CM

## 2017-07-05 DIAGNOSIS — E11.42 TYPE 2 DIABETES MELLITUS WITH DIABETIC POLYNEUROPATHY, WITH LONG-TERM CURRENT USE OF INSULIN (H): ICD-10-CM

## 2017-07-05 PROCEDURE — 99207 ZZC NO BILLABLE SERVICE THIS VISIT: CPT

## 2017-07-05 NOTE — PROGRESS NOTES
See tel enc dated 7/5/17 for information re blood glucose control and insulin dosing.  Melissa Garza RN  BSN CDE

## 2017-07-05 NOTE — TELEPHONE ENCOUNTER
Diabetes Follow-up    Subjective/Objective:    Felipe Campbell sent in blood glucose log for review. Last date of communication was: 6/15/17.    Diabetes is being managed with Lifestyle (diet/activity), Oral Medications: Metformin - Dose: 1000 mg bid, Time: breakfast and supper, Injectable Medications: Lantus 0-0-0-10 and NovoLog Insulin (4-5)-(3-4) -(4-5)-0.  Basically patient is using 1 unit per CHO choice plus 1 unit at each meal of Novolog.    BG/Food Log:   Melissa,    Attached are my glucose readings from Kerrie 15 through this noon, June 30.  I am back home again, and I m not sure of an exact date when I will return to [place].  There s been much paperwork and tracking down the appropriate people in moving my mother to a board-and-care home, so schedule varied from day to day.  I hope that it becomes a bit more regular now that I m back.    Leonid Campbell              Assessment:    Fasting blood glucose: 94% in target.    Before lunch glucose: 100% in target.  Before dinner glucose: 94% in target.  After dinner glucose: 44% in target.  Bedtime glucose: 83% in target.    Plan/Response:  See Patient Instructions for co-developed, patient-stated behavior change goals.  BG records reviewed.  Patient doing very well with blood glucose control on current Lantus and Novolog doses as above.  Patient needs a correction scale.  Recommend Novolog pre meal correction factor 1/80/180 as follows:    Pre meal blood glucose  180-260 add 1 unit, blood glucose 261- 340 add 2 units, blood glucose 341-420 add 3 units of Novolog.    Email back to patient from educator:    Chucky Jaquez,     Thank you for sending your blood glucose values in for review.  You are doing a fine job of monitoring your blood glucose and dosing your insulin.  Great to see the improved A1C level!  It looks like the 1 unit per carb choice plus 1 unit is working well for you at your meals and I believe you are using Lantus 10 units at bedtime. Let me know if  this is not correct.    A full insulin prescription should also include what we call a ' correction factor'  for times of illness or other potential issues that my increase the blood glucose and need correction back to goal range.  This is an added dose of Novolog that you would add to your pre meal dose when your premeal blood glucose level is above target range that will correct it back to your goal range.  I would recommend the following:    Pre meal blood glucose :  180-260 add 1 unit Novolog                                             261-340 add 2 units                                             341-420 add 3 units    You would add these units to the Novolog dose that you would be taking before you eat a meal.    Scheduled meal times do help with good control of BG but are not essential.  With the regime you are on, you should take your Novolog for your food before you eat your meal and know that Novolog has an action time of about 4 hours.      Let me know if you have any questions.    I would recommend that you make a follow up appointment with me in 4-6 weeks and we can talk more about how to address sick days etc.    Take care,    Melissa Garza RN  BSN CDE      Any diabetes medication dose changes were made via the CDE Protocol and Collaborative Practice Agreement with the patient's referring provider. A copy of this encounter was shared with the provider.

## 2017-07-05 NOTE — MR AVS SNAPSHOT
After Visit Summary   7/5/2017    Felipe Campbell    MRN: [id number]           Patient Information     Date Of Birth          1950        Visit Information        Provider Department      7/5/2017 11:45 AM  DIABETIC ED RESOURCE [facility]        Today's Diagnoses     Type 2 diabetes mellitus with diabetic polyneuropathy, with long-term current use of insulin (H)    -  1       Follow-ups after your visit        Who to contact     If you have questions or need follow up information about today's clinic visit or your schedule please contact [facility] directly at [phone].  Normal or non-critical lab and imaging results will be communicated to you by [employer], letter or phone within 4 business days after the clinic has received the results. If you do not hear from us within 7 days, please contact the clinic through [employer] or phone. If you have a critical or abnormal lab result, we will notify you by phone as soon as possible.  Submit refill requests through [employer] or call your pharmacy and they will forward the refill request to us. Please allow 3 business days for your refill to be completed.          Additional Information About Your Visit        MyChart Information     [employer] gives you secure access to your electronic health record. If you see a primary care provider, you can also send messages to your care team and make appointments. If you have questions, please call your primary care clinic.  If you do not have a primary care provider, please call [phone] and they will assist you.        Care EveryWhere ID     This is your Care EveryWhere ID. This could be used by other organizations to access your [facility] medical records  [id number]         Blood Pressure from Last 3 Encounters:   06/13/17 128/66   12/06/16 132/72   11/08/16 119/80    Weight from Last 3 Encounters:   06/13/17 209 lb (94.8 kg)   03/30/17 226 lb 8 oz (102.7 kg)   03/23/17 224 lb (101.6  kg)              Today, you had the following     No orders found for display         Today's Medication Changes          These changes are accurate as of: 7/5/17  1:30 PM.  If you have any questions, ask your nurse or doctor.               These medicines have changed or have updated prescriptions.        Dose/Directions    insulin aspart 100 UNIT/ML injection   Commonly known as:  NovoLOG FLEXPEN   This may have changed:  additional instructions   Used for:  Type 2 diabetes mellitus with diabetic polyneuropathy, with long-term current use of insulin (H)   Changed by:  Melissa Garza        4 units before breakfast, 4 units before lunch, 4 units before dinner. (Pre meal take 1 unit per carb choice +1 unit of Novolog.)  Plus Correction factor of 1/80/180 pre meal. Pre meal blood glucose 180-260 add 1 unit, blood glucose 261-340 add 2 units, blood glucose 341-420 add 3 units.  maximum units are 25 units per day in total   Quantity:  30 mL   Refills:  1            Where to get your medicines      Some of these will need a paper prescription and others can be bought over the counter.  Ask your nurse if you have questions.     Bring a paper prescription for each of these medications     insulin aspart 100 UNIT/ML injection                Primary Care Provider Office Phone # Fax #    Curt Schneider [phone]       [place] [id number] [facility] [id number]        Equal Access to Services     GINNA NAIR AH: Hadii aad ku hadasho Soomaali, waaxda luqadaha, qaybta kaalmada adeegyada, nelly valdez. So [facility] [phone].    ATENCIÓN: Si habla español, tiene a girard disposición servicios gratuitos de asistencia lingüística. Llame al [phone].    We comply with applicable federal civil rights laws and [place] laws. We do not discriminate on the basis of race, color, national origin, age, disability sex, sexual orientation or gender identity.             Thank you!     Thank you for choosing [facility]  for your care. Our goal is always to provide you with excellent care. Hearing back from our patients is one way we can continue to improve our services. Please take a few minutes to complete the written survey that you may receive in the mail after your visit with us. Thank you!             Your Updated Medication List - Protect others around you: Learn how to safely use, store and throw away your medicines at www.disposemymeds.org.          This list is accurate as of: 7/5/17  1:30 PM.  Always use your most recent med list.                   Brand Name Dispense Instructions for use Diagnosis    ACE/ARB NOT PRESCRIBED (INTENTIONAL)      by Other route continuous prn.    Type 2 diabetes, HbA1c goal < 7% (H)       aspirin 81 MG tablet      Take 1 tablet by mouth daily.    Type 2 diabetes, HbA1c goal < 7% (H)       blood glucose monitoring lancets     1 Box    Use to test blood sugar 2 times daily or as directed.  Ok to substitute alternative if insurance prefers.    Type 2 diabetes, controlled, with peripheral neuropathy (H)       blood glucose monitoring test strip    ACCU-CHEK SMARTVIEW    150 strip    Use to test blood sugar 4-5 times daily or as directed.  Ok to substitute alternative if insurance prefers.    Type 2 diabetes, controlled, with peripheral neuropathy (H)       cetirizine 10 MG tablet    zyrTEC     Take 10 mg by mouth daily as needed for allergies        fish oil-omega-3 fatty acids 1000 MG capsule      Take 1 capsule by mouth 2 times daily        GLUCAGON EMERGENCY 1 MG kit   Generic drug:  glucagon     1 mg    Inject 1 mg Subcutaneous once for 1 dose    Type 2 diabetes, controlled, with peripheral neuropathy (H)       insulin aspart 100 UNIT/ML injection    NovoLOG FLEXPEN    30 mL    4 units before breakfast, 4 units before lunch, 4 units before dinner. (Pre meal take 1 unit per carb choice +1 unit of Novolog.)  Plus Correction  factor of 1/80/180 pre meal. Pre meal blood glucose 180-260 add 1 unit, blood glucose 261-340 add 2 units, blood glucose 341-420 add 3 units.  maximum units are 25 units per day in total    Type 2 diabetes mellitus with diabetic polyneuropathy, with long-term current use of insulin (H)       insulin glargine 100 UNIT/ML injection    LANTUS SOLOSTAR    15 mL    Inject 10 Units Subcutaneous At Bedtime    Type 2 diabetes, controlled, with peripheral neuropathy (H)       insulin pen needle 31G X 6 MM     100 each    Use 4 daily or as directed.    Type 2 diabetes, controlled, with peripheral neuropathy (H)       lisinopril 5 MG tablet    PRINIVIL/ZESTRIL    90 tablet    TAKE 1 TABLET(5 MG) BY MOUTH DAILY    Type 2 diabetes, controlled, with peripheral neuropathy (H), Microalbuminuria       melatonin 1 MG Tabs tablet      Take 2 mg by mouth nightly as needed for sleep        metFORMIN 500 MG tablet    GLUCOPHAGE    360 tablet    TAKE 2 TABLETS BY MOUTH TWICE DAILY WITH MEALS    Type 2 diabetes mellitus with diabetic neuropathy, without long-term current use of insulin (H)       pravastatin 80 MG tablet    PRAVACHOL    90 tablet    TAKE 1 TABLET(80 MG) BY MOUTH DAILY    Hyperlipidemia LDL goal <100       VIAGRA 100 MG cap/tab   Generic drug:  sildenafil     8 tablet    TAKE 1 TABLET BY MOUTH DAILY AS NEEDED FOR ERECTILE DYSFUNCTION AT LEAST 30 MINUTES BEFORE INTERCOURSE    Erectile dysfunction of organic origin

## 2017-07-28 DIAGNOSIS — E11.42 TYPE 2 DIABETES, CONTROLLED, WITH PERIPHERAL NEUROPATHY (H): ICD-10-CM

## 2017-07-28 RX ORDER — BLOOD SUGAR DIAGNOSTIC
STRIP MISCELLANEOUS
Qty: 150 STRIP | Refills: 3 | Status: SHIPPED | OUTPATIENT
Start: 2017-07-28 | End: 2017-12-27

## 2017-07-28 NOTE — TELEPHONE ENCOUNTER
Prescription approved per [facility] Refill Protocol.    Signed Prescriptions:                        Disp   Refills    ACCU-CHEK SMARTVIEW test strip             150 st*3        Sig: USE TO TEST BLOOD SUGAR 4 TO 5 TIMES DAILY OR AS           DIRECTED  Authorizing Provider: SEBASTIAN DAMON  Ordering User: STACI TA RN, BSN

## 2017-08-25 DIAGNOSIS — E11.42 TYPE 2 DIABETES, CONTROLLED, WITH PERIPHERAL NEUROPATHY (H): ICD-10-CM

## 2017-08-25 DIAGNOSIS — R80.9 MICROALBUMINURIA: ICD-10-CM

## 2017-08-25 RX ORDER — LISINOPRIL 5 MG/1
TABLET ORAL
Qty: 90 TABLET | Refills: 2 | Status: SHIPPED | OUTPATIENT
Start: 2017-08-25 | End: 2018-06-03

## 2017-08-25 NOTE — TELEPHONE ENCOUNTER
Prescription approved per [facility] Refill Protocol.    Signed Prescriptions:                        Disp   Refills    lisinopril (PRINIVIL/ZESTRIL) 5 MG tablet  90 tab*2        Sig: TAKE 1 TABLET(5 MG) BY MOUTH DAILY  Authorizing Provider: SEBASTIAN DAMON  Ordering User: MABEL DORSEY, ALEXEY

## 2017-08-25 NOTE — TELEPHONE ENCOUNTER
Lisinopril      Last Written Prescription Date: 5/25/17  Last Fill Quantity: 90, # refills: 0  Last Office Visit with G, P or [facility] prescribing provider: 6/13/17  Next 5 appointments (look out 90 days)     Aug 31, 2017 11:30 AM CDT   SHORT with Emmanuelle Henry RPH   [facility] ([facility]    [address] [facility] [address] [phone]                   Potassium   Date Value Ref Range Status   06/13/2017 4.0 3.4 - 5.3 mmol/L Final     Creatinine   Date Value Ref Range Status   06/13/2017 0.96 0.66 - 1.25 mg/dL Final     BP Readings from Last 3 Encounters:   06/13/17 128/66   12/06/16 132/72   11/08/16 119/80

## 2017-08-31 ENCOUNTER — OFFICE VISIT (OUTPATIENT)
Dept: PHARMACY | Facility: CLINIC | Age: 67
End: 2017-08-31
Payer: COMMERCIAL

## 2017-08-31 VITALS — DIASTOLIC BLOOD PRESSURE: 72 MMHG | SYSTOLIC BLOOD PRESSURE: 120 MMHG

## 2017-08-31 DIAGNOSIS — Z79.4 TYPE 2 DIABETES MELLITUS WITH DIABETIC POLYNEUROPATHY, WITH LONG-TERM CURRENT USE OF INSULIN (H): Primary | ICD-10-CM

## 2017-08-31 DIAGNOSIS — E78.5 HYPERLIPIDEMIA LDL GOAL <100: ICD-10-CM

## 2017-08-31 DIAGNOSIS — M17.0 OSTEOARTHRITIS OF BOTH KNEES, UNSPECIFIED OSTEOARTHRITIS TYPE: ICD-10-CM

## 2017-08-31 DIAGNOSIS — J30.1 HAYFEVER: ICD-10-CM

## 2017-08-31 DIAGNOSIS — G47.00 INSOMNIA, UNSPECIFIED TYPE: ICD-10-CM

## 2017-08-31 DIAGNOSIS — N52.9 ERECTILE DYSFUNCTION OF ORGANIC ORIGIN: ICD-10-CM

## 2017-08-31 DIAGNOSIS — R80.9 MICROALBUMINURIA: ICD-10-CM

## 2017-08-31 DIAGNOSIS — E11.42 TYPE 2 DIABETES MELLITUS WITH DIABETIC POLYNEUROPATHY, WITH LONG-TERM CURRENT USE OF INSULIN (H): Primary | ICD-10-CM

## 2017-08-31 PROCEDURE — 99607 MTMS BY PHARM ADDL 15 MIN: CPT | Performed by: PHARMACIST

## 2017-08-31 PROCEDURE — 99605 MTMS BY PHARM NP 15 MIN: CPT | Performed by: PHARMACIST

## 2017-08-31 RX ORDER — ACETAMINOPHEN 500 MG
500-1000 TABLET ORAL EVERY 6 HOURS PRN
COMMUNITY

## 2017-08-31 NOTE — PATIENT INSTRUCTIONS
Recommendations from today's MTM visit:                                                    MTM (medication therapy management) is a service provided by a clinical pharmacist designed to help you get the most of out of your medicines.   Today we reviewed what your medicines are for, how to know if they are working, that your medicines are safe and how to make your medicine regimen as easy as possible.     1.  We will see how your cholesterol comes back in November/December.  If the LDL is still in the 90s - we will consider changing pravastatin to Crestor (rosuvastatin).    Next MTM visit:  Pending labs     To schedule another MTM appointment, please call the clinic directly or you may call the MTM scheduling line at [phone] or toll-free at [phone].     My Clinical Pharmacist's contact information:                                                      It was a pleasure seeing you today!  Please feel free to contact me with any questions or concerns you have.      Emmanuelle Henry, PharmD, [facility]  Medication Therapy Management Provider  Pager: [phone]     You may receive a survey about the MTM services you received.  I would appreciate your feedback to help me serve you better in the future. Please fill it out and return it when you can. Your comments will be anonymous.

## 2017-08-31 NOTE — MR AVS SNAPSHOT
After Visit Summary   8/31/2017    Felipe Campbell    MRN: [id number]           Patient Information     Date Of Birth          1950        Visit Information        Provider Department      8/31/2017 11:30 AM Emmanuelle Henry, [facility] MTM        Today's Diagnoses     Type 2 diabetes mellitus with diabetic polyneuropathy, with long-term current use of insulin (H)    -  1    Microalbuminuria          Care Instructions    Recommendations from today's MTM visit:                                                    MTM (medication therapy management) is a service provided by a clinical pharmacist designed to help you get the most of out of your medicines.   Today we reviewed what your medicines are for, how to know if they are working, that your medicines are safe and how to make your medicine regimen as easy as possible.     1.  We will see how your cholesterol comes back in November/December.  If the LDL is still in the 90s - we will consider changing pravastatin to Crestor (rosuvastatin).    Next MTM visit:  Pending labs     To schedule another MTM appointment, please call the clinic directly or you may call the MTM scheduling line at [phone] or toll-free at [phone].     My Clinical Pharmacist's contact information:                                                      It was a pleasure seeing you today!  Please feel free to contact me with any questions or concerns you have.      Emmanuelle Henry, PharmD, [facility]  Medication Therapy Management Provider  Pager: [phone]     You may receive a survey about the MTM services you received.  I would appreciate your feedback to help me serve you better in the future. Please fill it out and return it when you can. Your comments will be anonymous.                     Follow-ups after your visit        Future tests that were ordered for you today     Open Future Orders        Priority Expected Expires Ordered    Hemoglobin A1c Routine   8/31/2018 8/31/2017    Comprehensive metabolic panel Routine  8/31/2018 8/31/2017    Lipid panel reflex to direct LDL Routine  8/31/2018 8/31/2017    Albumin Random Urine Quantitative with Creat Ratio Routine  8/31/2018 8/31/2017            Who to contact     If you have questions or need follow up information about today's clinic visit or your schedule please contact [facility] directly at [phone].  Normal or non-critical lab and imaging results will be communicated to you by MyChart, letter or phone within 4 business days after the clinic has received the results. If you do not hear from us within 7 days, please contact the clinic through [employer] or phone. If you have a critical or abnormal lab result, we will notify you by phone as soon as possible.  Submit refill requests through [employer] or call your pharmacy and they will forward the refill request to us. Please allow 3 business days for your refill to be completed.          Additional Information About Your Visit        [employer] Information     [employer] gives you secure access to your electronic health record. If you see a primary care provider, you can also send messages to your care team and make appointments. If you have questions, please call your primary care clinic.  If you do not have a primary care provider, please call [phone] and they will assist you.        Care EveryWhere ID     This is your Care EveryWhere ID. This could be used by other organizations to access your [place] medical records  [id number]         Blood Pressure from Last 3 Encounters:   08/31/17 120/72   06/13/17 128/66   12/06/16 132/72    Weight from Last 3 Encounters:   06/13/17 209 lb (94.8 kg)   03/30/17 226 lb 8 oz (102.7 kg)   03/23/17 224 lb (101.6 kg)               Primary Care Provider Office Phone # Fax #    Curt Schneider [phone] [address] [facility] [place] [address]        Equal Access to Services     GINNA NAIR  AH: Lu zazuetalouisemarcelo Tony, waaxda luqadaha, qaybta kaleslie monae, waxesme hema rodriguezrhiannon leelucysanjeev valdez. So [facility] [phone].    ATENCIÓN: Si habla hannah, tiene a girard disposición servicios gratuitos de asistencia lingüística. Llame al [phone].    We comply with applicable federal civil rights laws and [place] laws. We do not discriminate on the basis of race, color, national origin, age, disability sex, sexual orientation or gender identity.            Thank you!     Thank you for choosing [facility]  for your care. Our goal is always to provide you with excellent care. Hearing back from our patients is one way we can continue to improve our services. Please take a few minutes to complete the written survey that you may receive in the mail after your visit with us. Thank you!             Your Updated Medication List - Protect others around you: Learn how to safely use, store and throw away your medicines at www.disposemymeds.org.          This list is accurate as of: 8/31/17 11:53 AM.  Always use your most recent med list.                   Brand Name Dispense Instructions for use Diagnosis    ACCU-CHEK SMARTVIEW test strip   Generic drug:  blood glucose monitoring     150 strip    USE TO TEST BLOOD SUGAR 4 TO 5 TIMES DAILY OR AS DIRECTED    Type 2 diabetes, controlled, with peripheral neuropathy (H)       ACE/ARB NOT PRESCRIBED (INTENTIONAL)      by Other route continuous prn.    Type 2 diabetes, HbA1c goal < 7% (H)       acetaminophen 500 MG tablet    TYLENOL     Take 500-1,000 mg by mouth every 6 hours as needed for mild pain        aspirin 81 MG tablet      Take 1 tablet by mouth daily.    Type 2 diabetes, HbA1c goal < 7% (H)       blood glucose monitoring lancets     1 Box    Use to test blood sugar 2 times daily or as directed.  Ok to substitute alternative if insurance prefers.    Type 2 diabetes, controlled, with peripheral neuropathy (H)       cetirizine 10 MG  tablet    zyrTEC     Take 10 mg by mouth daily as needed for allergies        fish oil-omega-3 fatty acids 1000 MG capsule      Take 1 capsule by mouth 2 times daily        GLUCAGON EMERGENCY 1 MG kit   Generic drug:  glucagon     1 mg    Inject 1 mg Subcutaneous once for 1 dose    Type 2 diabetes, controlled, with peripheral neuropathy (H)       insulin aspart 100 UNIT/ML injection    NovoLOG FLEXPEN    30 mL    4 units before breakfast, 4 units before lunch, 4 units before dinner. (Pre meal take 1 unit per carb choice +1 unit of Novolog.)  Plus Correction factor of 1/80/180 pre meal. Pre meal blood glucose 180-260 add 1 unit, blood glucose 261-340 add 2 units, blood glucose 341-420 add 3 units.  maximum units are 25 units per day in total    Type 2 diabetes mellitus with diabetic polyneuropathy, with long-term current use of insulin (H)       insulin glargine 100 UNIT/ML injection    LANTUS SOLOSTAR    15 mL    Inject 10 Units Subcutaneous At Bedtime    Type 2 diabetes, controlled, with peripheral neuropathy (H)       insulin pen needle 31G X 6 MM     100 each    Use 4 daily or as directed.    Type 2 diabetes, controlled, with peripheral neuropathy (H)       lisinopril 5 MG tablet    PRINIVIL/ZESTRIL    90 tablet    TAKE 1 TABLET(5 MG) BY MOUTH DAILY    Type 2 diabetes, controlled, with peripheral neuropathy (H), Microalbuminuria       melatonin 1 MG Tabs tablet      Take 2 mg by mouth nightly as needed for sleep        metFORMIN 500 MG tablet    GLUCOPHAGE    360 tablet    TAKE 2 TABLETS BY MOUTH TWICE DAILY WITH MEALS    Type 2 diabetes mellitus with diabetic neuropathy, without long-term current use of insulin (H)       pravastatin 80 MG tablet    PRAVACHOL    90 tablet    TAKE 1 TABLET(80 MG) BY MOUTH DAILY    Hyperlipidemia LDL goal <100       VIAGRA 100 MG cap/tab   Generic drug:  sildenafil     8 tablet    TAKE 1 TABLET BY MOUTH DAILY AS NEEDED FOR ERECTILE DYSFUNCTION AT LEAST 30 MINUTES BEFORE INTERCOURSE     Erectile dysfunction of organic origin

## 2017-09-17 DIAGNOSIS — E11.40 TYPE 2 DIABETES MELLITUS WITH DIABETIC NEUROPATHY, WITHOUT LONG-TERM CURRENT USE OF INSULIN (H): ICD-10-CM

## 2017-09-18 NOTE — TELEPHONE ENCOUNTER
metFORMIN (GLUCOPHAGE) 500 MG tablet         Last Written Prescription Date: 6/7/2017  Last Fill Quantity: 360, # refills: 0  Last Office Visit with [facility], [facility] or [facility] prescribing provider:  6/13/2017        BP Readings from Last 3 Encounters:   08/31/17 120/72   06/13/17 128/66   12/06/16 132/72     Lab Results   Component Value Date    MICROL 47 06/13/2017     Lab Results   Component Value Date    UMALCR 35.22 06/13/2017     Creatinine   Date Value Ref Range Status   06/13/2017 0.96 0.66 - 1.25 mg/dL Final   ]  GFR Estimate   Date Value Ref Range Status   06/13/2017 79 >60 mL/min/1.7m2 Final     Comment:     Non  GFR Calc   11/30/2016 79 >60 mL/min/1.7m2 Final     Comment:     Non  GFR Calc   05/27/2016 70 >60 mL/min/1.7m2 Final     Comment:     Non  GFR Calc     GFR Estimate If Black   Date Value Ref Range Status   06/13/2017 >90   GFR Calc   >60 mL/min/1.7m2 Final   11/30/2016 >90   GFR Calc   >60 mL/min/1.7m2 Final   05/27/2016 85 >60 mL/min/1.7m2 Final     Comment:      GFR Calc     Lab Results   Component Value Date    CHOL 183 11/30/2016     Lab Results   Component Value Date    HDL 57 11/30/2016     Lab Results   Component Value Date    LDL 95 11/30/2016     Lab Results   Component Value Date    TRIG 154 11/30/2016     Lab Results   Component Value Date    CHOLHDLRATIO 2.7 06/15/2015     Lab Results   Component Value Date    AST 16 11/30/2016     Lab Results   Component Value Date    ALT 26 11/30/2016     Lab Results   Component Value Date    A1C 6.8 06/13/2017    A1C 8.1 12/06/2016    A1C 7.2 05/27/2016    A1C 6.9 12/10/2015    A1C 7.4 05/19/2015     Potassium   Date Value Ref Range Status   06/13/2017 4.0 3.4 - 5.3 mmol/L Final

## 2017-09-18 NOTE — TELEPHONE ENCOUNTER
Prescription approved per [facility] Refill Protocol.    Signed Prescriptions:                        Disp   Refills    metFORMIN (GLUCOPHAGE) 500 MG tablet       360 ta*0        Sig: TAKE 2 TABLETS BY MOUTH TWICE DAILY WITH MEALS  Authorizing Provider: SEBASTIAN DAMON  Ordering User: MABEL DORSEY, RN

## 2017-09-21 ENCOUNTER — ALLIED HEALTH/NURSE VISIT (OUTPATIENT)
Dept: EDUCATION SERVICES | Facility: CLINIC | Age: 67
End: 2017-09-21
Payer: COMMERCIAL

## 2017-09-21 VITALS — WEIGHT: 205 LBS | BODY MASS INDEX: 25.28 KG/M2

## 2017-09-21 DIAGNOSIS — E11.42 TYPE 2 DIABETES MELLITUS WITH DIABETIC POLYNEUROPATHY, WITH LONG-TERM CURRENT USE OF INSULIN (H): Primary | ICD-10-CM

## 2017-09-21 DIAGNOSIS — Z79.4 TYPE 2 DIABETES MELLITUS WITH DIABETIC POLYNEUROPATHY, WITH LONG-TERM CURRENT USE OF INSULIN (H): Primary | ICD-10-CM

## 2017-09-21 PROCEDURE — G0108 DIAB MANAGE TRN  PER INDIV: HCPCS

## 2017-09-21 NOTE — PROGRESS NOTES
Diabetes Self Management Training: Follow-up Visit    Felipe Campbell presents today for education and evaluation of glucose control related to Type 2 diabetes.    He is accompanied by self    Patient's diabetes management related comments/concerns: I have not weighed this little in 30 years.  More physically active since [facility].    Patient would like this visit to be focused around the following diabetes-related behaviors and goals: Healthy Eating and Taking Medication    ASSESSMENT:  Patient Problem List reviewed for relevant medical history and current medical status.  Patient would like to maintain his weigh loss and not lose more, so needs some information re the amount of food that he should eat. Patient states that he feels fine.  Patient states that he is feeling better since starting to take the pre meal insulin and works to keep his blood glucose controlled as he does not want the complications that his father had.  Reports at home after his shower his weight is 199 lbs.  Reports increased emotional stress due to his mothers illness.  BG levels well controlled.    Current Diabetes Management per Patient:  Taking diabetes medications?   yes:     Diabetes Medication(s)     Biguanides Sig    metFORMIN (GLUCOPHAGE) 500 MG tablet TAKE 2 TABLETS BY MOUTH TWICE DAILY WITH MEALS    Diabetic Other Sig    GLUCAGON EMERGENCY 1 MG kit Inject 1 mg Subcutaneous once for 1 dose    Insulin Sig    insulin aspart (NOVOLOG FLEXPEN) 100 UNIT/ML injection 4 units before breakfast, 4 units before lunch, 4 units before dinner. (Pre meal take 1 unit per carb choice +1 unit of Novolog.)  Plus Correction factor of 1/80/180 pre meal. Pre meal blood glucose 180-260 add 1 unit, blood glucose 261-340 add 2 units, blood glucose 341-420 add 3 units.   maximum units are 25 units per day in total    insulin glargine (LANTUS SOLOSTAR) 100 UNIT/ML injection Inject 10 Units Subcutaneous At Bedtime          *Abbreviated insulin dose  documentation key: Insulin Trade Name (knvlvqrhx-soaqq-mdcnpm-bedtime) - i.e. Humalog 5-5-5-0 (Humalog 5 units at breakfast, 5 units at lunch, and 5 units at dinner).    Patient glucose self monitoring as follows: 4-5 times per day   BG meter:   BG results: see blood glucose record below        BG values are: In goal  Patient's most recent   Lab Results   Component Value Date    A1C 6.8 06/13/2017    is meeting goal of <7.0    Nutrition:  Patient eats 3 meals per day and counts carbohydrates in choices.  Occasional snack.    Breakfast - 7:30-8:30 shredded wheat with berries and 4-6 oz milk and 1/2 banana, coffee  Lunch - 12-12:30-   Varies-  1/2 of ham and cheese sandwich with lettuce and handful of corn chips (13) and 8 oz milk.  May have a handful of grapes if his blood glucose is a bit lower.  Dinner - 6:30-7 pm-  [place], pasta with pesto, snap peas and salad greens with non starchy vegetables, thousand island dressing, water  Snacks -  Fruit or nuts    Biggest Challenge to Healthy Eating: currently losing weight, wants to maintain now.    Consulted Le Echevarria RD CDE for input re diet recommendations for this patient.  Ok, here s the scoop J    1) Most recent weight was 205#, ideally he would lose ~8# more to get to 197# which gets his BMI out of the  overweight  category (if he is interested in that)  2) I guesstimate that he is eating about 1500 tracey/day per the recall in the note, but somewhat difficult to tell without exact portions. I called it 3.5 CHO at breakfast, 2-3 at lunch, guessed 3 at dinner with pasta as only carb, assuming ~100 tracey snack of fruit or nuts.  3) To maintain, he may need closer to 1800 calories (potentially more if very active, but start with 1800). Don t want to overdo it and cause gain!  4) Easy message is to add 200 calories/day and see if he maintains. If more loss, add in another 100 and so on. Practically, I would recommend 1) eat the fruit every day at lunch (grapes or  "whatever, 1 carb choice) and increase meal insulin if appropriate. 2) eat a snack of fruit every day and eat a snack of nuts every day (or eat them together, but eat both daily, not either/or). That would add ~ 200 calories consistently. Could eat a whole sandwich at lunch for another 100 tracey from the bread, again, may need more insulin.   5) If he wants the ramon stevenson, 1800 tracey meal plan would include 15 carb choices (divvied up at meals/snacks & varied with fruit, milk, starch ), 5-6 oz lean protein, 3-5 non-starchy veggies, ~ 4 fats (might be eating a higher fat protein like cheese or if adds bazzi etc).   6) It looks like he has been doing a fairly heart healthy meal plan, so I hesitate to add in much fat Simple bet is just increase by 200-300 tracey/day for maintenance.   7) Easy to track intake & activity on an norma J Scuttledog, etc.    These numbers still work with his home current weight.  Le Echevarria RD CDE      Physical Activity:    Type: biking or swimming  and yard work  Also uses weights to keep upper body strong for playing bass.  Duration: 30-60+  minutes  Frequency: 3 days/week  Barriers: arthritis in his knee    Diabetes Complications:  Acute Complications: At risk for hypoglycemia? yes  Symptoms of low blood sugar? sweating and shaking  Frequency of hypoglycemia: rare  Patient carries a carbohydrate source with them regularly: Yes   Type of carbohydrate: glucose tablets    Vitals:  Wt 205 lb (93 kg)  BMI 25.28 kg/m2  Estimated body mass index is 25.78 kg/(m^2) as calculated from the following:    Height as of 6/13/17: 6' 3.5\" (1.918 m).    Weight as of 6/13/17: 209 lb (94.8 kg).   Last 3 BP:   BP Readings from Last 3 Encounters:   08/31/17 120/72   06/13/17 128/66   12/06/16 132/72       History   Smoking Status     Former Smoker     Types: Cigarettes     Quit date: 1/1/1979   Smokeless Tobacco     Never Used       Labs:  Lab Results   Component Value Date    A1C 6.8 06/13/2017     Lab Results "   Component Value Date    GLC 99 06/13/2017     Lab Results   Component Value Date    LDL 95 11/30/2016     HDL Cholesterol   Date Value Ref Range Status   11/30/2016 57 >39 mg/dL Final   ]  GFR Estimate   Date Value Ref Range Status   06/13/2017 79 >60 mL/min/1.7m2 Final     Comment:     Non  GFR Calc     GFR Estimate If Black   Date Value Ref Range Status   06/13/2017 >90   GFR Calc   >60 mL/min/1.7m2 Final     Lab Results   Component Value Date    CR 0.96 06/13/2017     No results found for: MICROALBUMIN    Health Beliefs and Attitudes:   Patient Activation Measure Survey Score:  No flowsheet data found.    Stage of Change: MAINTENANCE (Working to maintain change, with risk of relapse)    Diabetes knowledge and skills assessment:     Patient is knowledgeable in diabetes management concepts related to: Healthy Eating, Being Active, Monitoring, Taking Medication, Problem Solving and Healthy Coping    Patient needs further education on the following diabetes management concepts: Healthy Eating    Barriers to Learning Assessment: No Barriers identified    Based on learning assessment above, most appropriate setting for further diabetes education would be: Individual setting.    INTERVENTION:    Education provided today on:  AADE Self-Care Behaviors:  Healthy Eating: portion control and amount of carb to eat daily  Being Active: relationship to blood glucose, precautions to take, adjustment of insulin for activity or adding a snack.    Opportunities for ongoing education and support in diabetes-self management were discussed.    Pt verbalized understanding of concepts discussed and recommendations provided today.       Education Materials Provided:  No new materials provided today    PLAN:  See Patient Instructions for co-developed, patient-stated behavior change goals.  AVS printed and provided to patient today.    FOLLOW-UP:  Follow-up as needed.   Follow-up yearly for diabetes  education review.  Chart routed to referring provider.  Attempted to contact patient by phone to give him diet information recommended by RD but there was no answer.  I left a message that I will print out the information and mail it to him and he can call me with any questions after Oct 4.    Ongoing plan for education and support: Follow-up with primary care provider    Melissa Garza RN  BSN CDE    Time Spent: 60 minutes  Encounter Type: Individual    Any diabetes medication dose changes were made via the CDE Protocol and Collaborative Practice Agreement with the patient's referring provider. A copy of this encounter was shared with the provider.

## 2017-09-21 NOTE — MR AVS SNAPSHOT
After Visit Summary   9/21/2017    Felipe Campbell    MRN: [id number]           Patient Information     Date Of Birth          1950        Visit Information        Provider Department      9/21/2017 9:30 AM  DIABETIC ED RESOURCE [facility]        Care Instructions    Keep up the good work!    Call if concerns.          Follow-ups after your visit        Who to contact     If you have questions or need follow up information about today's clinic visit or your schedule please contact [facility] directly at [phone].  Normal or non-critical lab and imaging results will be communicated to you by BiOWiSHhart, letter or phone within 4 business days after the clinic has received the results. If you do not hear from us within 7 days, please contact the clinic through GreenGar or phone. If you have a critical or abnormal lab result, we will notify you by phone as soon as possible.  Submit refill requests through GreenGar or call your pharmacy and they will forward the refill request to us. Please allow 3 business days for your refill to be completed.          Additional Information About Your Visit        MyChart Information     GreenGar gives you secure access to your electronic health record. If you see a primary care provider, you can also send messages to your care team and make appointments. If you have questions, please call your primary care clinic.  If you do not have a primary care provider, please call [phone] and they will assist you.        Care EveryWhere ID     This is your Care EveryWhere ID. This could be used by other organizations to access your [facility] medical records  [id number]        Your Vitals Were     BMI (Body Mass Index)                   25.28 kg/m2            Blood Pressure from Last 3 Encounters:   08/31/17 120/72   06/13/17 128/66   12/06/16 132/72    Weight from Last 3 Encounters:   09/21/17 205 lb (93 kg)   06/13/17 209 lb (94.8 kg)    03/30/17 226 lb 8 oz (102.7 kg)              Today, you had the following     No orders found for display       Primary Care Provider Office Phone # Fax #    Curt Schneider [phone] [address] [facility]  [place] [address]        Equal Access to Services     [facility] : Hadii aad ku hadasho Soomaali, waaxda luqadaha, qaybta kaalmada adeegyada, waxay idiin hayaan adeeg dasha la'aan . So [facility] [place] [facility] [phone].    ATENCIÓN: Si habla español, tiene a girard disposición servicios gratuitos de asistencia lingüística. Llame al [phone].    We comply with applicable federal civil rights laws and [place] laws. We do not discriminate on the basis of race, color, national origin, age, disability sex, sexual orientation or gender identity.            Thank you!     Thank you for choosing [facility]  for your care. Our goal is always to provide you with excellent care. Hearing back from our patients is one way we can continue to improve our services. Please take a few minutes to complete the written survey that you may receive in the mail after your visit with us. Thank you!             Your Updated Medication List - Protect others around you: Learn how to safely use, store and throw away your medicines at www.disposemymeds.org.          This list is accurate as of: 9/21/17 10:18 AM.  Always use your most recent med list.                   Brand Name Dispense Instructions for use Diagnosis    ACCU-CHEK SMARTVIEW test strip   Generic drug:  blood glucose monitoring     150 strip    USE TO TEST BLOOD SUGAR 4 TO 5 TIMES DAILY OR AS DIRECTED    Type 2 diabetes, controlled, with peripheral neuropathy (H)       acetaminophen 500 MG tablet    TYLENOL     Take 500-1,000 mg by mouth every 6 hours as needed for mild pain        aspirin 81 MG tablet      Take 1 tablet by mouth daily.    Type 2 diabetes, HbA1c goal < 7% (H)       blood glucose monitoring lancets     1 Box    Use to test blood  sugar 2 times daily or as directed.  Ok to substitute alternative if insurance prefers.    Type 2 diabetes, controlled, with peripheral neuropathy (H)       cetirizine 10 MG tablet    zyrTEC     Take 10 mg by mouth daily as needed for allergies        fish oil-omega-3 fatty acids 1000 MG capsule      Take 1 capsule by mouth 2 times daily        GLUCAGON EMERGENCY 1 MG kit   Generic drug:  glucagon     1 mg    Inject 1 mg Subcutaneous once for 1 dose    Type 2 diabetes, controlled, with peripheral neuropathy (H)       insulin aspart 100 UNIT/ML injection    NovoLOG FLEXPEN    30 mL    4 units before breakfast, 4 units before lunch, 4 units before dinner. (Pre meal take 1 unit per carb choice +1 unit of Novolog.)  Plus Correction factor of 1/80/180 pre meal. Pre meal blood glucose 180-260 add 1 unit, blood glucose 261-340 add 2 units, blood glucose 341-420 add 3 units.  maximum units are 25 units per day in total    Type 2 diabetes mellitus with diabetic polyneuropathy, with long-term current use of insulin (H)       insulin glargine 100 UNIT/ML injection    LANTUS SOLOSTAR    15 mL    Inject 10 Units Subcutaneous At Bedtime    Type 2 diabetes, controlled, with peripheral neuropathy (H)       insulin pen needle 31G X 6 MM     100 each    Use 4 daily or as directed.    Type 2 diabetes, controlled, with peripheral neuropathy (H)       lisinopril 5 MG tablet    PRINIVIL/ZESTRIL    90 tablet    TAKE 1 TABLET(5 MG) BY MOUTH DAILY    Type 2 diabetes, controlled, with peripheral neuropathy (H), Microalbuminuria       melatonin 1 MG Tabs tablet      Take 2 mg by mouth nightly as needed for sleep        metFORMIN 500 MG tablet    GLUCOPHAGE    360 tablet    TAKE 2 TABLETS BY MOUTH TWICE DAILY WITH MEALS    Type 2 diabetes mellitus with diabetic neuropathy, without long-term current use of insulin (H)       pravastatin 80 MG tablet    PRAVACHOL    90 tablet    TAKE 1 TABLET(80 MG) BY MOUTH DAILY    Hyperlipidemia LDL goal <100        VIAGRA 100 MG tablet   Generic drug:  sildenafil     8 tablet    TAKE 1 TABLET BY MOUTH DAILY AS NEEDED FOR ERECTILE DYSFUNCTION AT LEAST 30 MINUTES BEFORE INTERCOURSE    Erectile dysfunction of organic origin

## 2017-12-12 DIAGNOSIS — Z79.4 TYPE 2 DIABETES MELLITUS WITH DIABETIC POLYNEUROPATHY, WITH LONG-TERM CURRENT USE OF INSULIN (H): ICD-10-CM

## 2017-12-12 DIAGNOSIS — E11.42 TYPE 2 DIABETES MELLITUS WITH DIABETIC POLYNEUROPATHY, WITH LONG-TERM CURRENT USE OF INSULIN (H): ICD-10-CM

## 2017-12-12 LAB
ALBUMIN SERPL-MCNC: 3.9 G/DL (ref 3.4–5)
ALP SERPL-CCNC: 61 U/L (ref 40–150)
ALT SERPL W P-5'-P-CCNC: 23 U/L (ref 0–70)
ANION GAP SERPL CALCULATED.3IONS-SCNC: 6 MMOL/L (ref 3–14)
AST SERPL W P-5'-P-CCNC: 12 U/L (ref 0–45)
BILIRUB SERPL-MCNC: 0.6 MG/DL (ref 0.2–1.3)
BUN SERPL-MCNC: 16 MG/DL (ref 7–30)
CALCIUM SERPL-MCNC: 9.3 MG/DL (ref 8.5–10.1)
CHLORIDE SERPL-SCNC: 105 MMOL/L (ref 94–109)
CHOLEST SERPL-MCNC: 150 MG/DL
CO2 SERPL-SCNC: 30 MMOL/L (ref 20–32)
CREAT SERPL-MCNC: 1.1 MG/DL (ref 0.66–1.25)
GFR SERPL CREATININE-BSD FRML MDRD: 67 ML/MIN/1.7M2
GLUCOSE SERPL-MCNC: 107 MG/DL (ref 70–99)
HBA1C MFR BLD: 6.2 % (ref 4.3–6)
HDLC SERPL-MCNC: 69 MG/DL
LDLC SERPL CALC-MCNC: 63 MG/DL
NONHDLC SERPL-MCNC: 81 MG/DL
POTASSIUM SERPL-SCNC: 4.6 MMOL/L (ref 3.4–5.3)
PROT SERPL-MCNC: 7 G/DL (ref 6.8–8.8)
SODIUM SERPL-SCNC: 141 MMOL/L (ref 133–144)
TRIGL SERPL-MCNC: 91 MG/DL

## 2017-12-12 PROCEDURE — 80053 COMPREHEN METABOLIC PANEL: CPT | Performed by: INTERNAL MEDICINE

## 2017-12-12 PROCEDURE — 80061 LIPID PANEL: CPT | Performed by: INTERNAL MEDICINE

## 2017-12-12 PROCEDURE — 36415 COLL VENOUS BLD VENIPUNCTURE: CPT | Performed by: INTERNAL MEDICINE

## 2017-12-12 PROCEDURE — 83036 HEMOGLOBIN GLYCOSYLATED A1C: CPT | Performed by: INTERNAL MEDICINE

## 2017-12-19 NOTE — PROGRESS NOTES
SUBJECTIVE:   Felipe Campbell is a 67 year old male who presents to clinic today for the following health issues:       Type 2 diabetes mellitus with diabetic neuropathy, without long-term current use of insulin (H)  At risk for falling  Need for prophylactic vaccination and inoculation against influenza     Patient with chronic diabetes mellitus type 2  And this is well controlled at this point.     Diabetes -- Patient reports he has been paying close attention to carbohydrate intake and insulin accordingly. He has mild paresthesia with his bilateral feet. Had his eyes examined about 2 months ago.  Lab Results   Component Value Date    A1C 6.2 12/12/2017    A1C 6.8 06/13/2017    A1C 8.1 12/06/2016    A1C 7.2 05/27/2016    A1C 6.9 12/10/2015     Knee Pain -- He notes he still exercises but has some pain related to arthritis in his right knee. The pain is located in the medial joint line.    Additional Notes -- Declines influenza vaccination today.    Problem list and histories reviewed & adjusted, as indicated.  Additional history: as documented    Patient Active Problem List   Diagnosis     Erectile dysfunction of organic origin     HYPERLIPIDEMIA LDL GOAL <100     Microalbuminuria     Advanced directives, counseling/discussion     Hayfever     Type 2 diabetes mellitus with diabetic polyneuropathy, with long-term current use of insulin (H)     CKD (chronic kidney disease) stage 2, GFR 60-89 ml/min     Past Surgical History:   Procedure Laterality Date     C APPENDECTOMY  07/20/69     COLONOSCOPY WITH CO2 INSUFFLATION N/A 11/8/2016    Procedure: COLONOSCOPY WITH CO2 INSUFFLATION;  Surgeon: Torey Almazan MD;  Location: [facility] OR     HERNIA REPAIR, INGUINAL RT/LT  age 5    left     TUNA         Social History   Substance Use Topics     Smoking status: Former Smoker     Types: Cigarettes     Quit date: 1/1/1979     Smokeless tobacco: Never Used     Alcohol use Yes      Comment: occasionally     Family History    Problem Relation Age of Onset     Breast Cancer Mother      DIABETES Father      DIABETES Maternal Grandmother      Congenital Anomalies Sister      Psychotic Disorder Daughter          Current Outpatient Prescriptions   Medication Sig Dispense Refill     metFORMIN (GLUCOPHAGE) 500 MG tablet TAKE 2 TABLETS BY MOUTH TWICE DAILY WITH MEALS 360 tablet 3     LANTUS SOLOSTAR 100 UNIT/ML soln INJECT 10 UNITS UNDER THE SKIN AT BEDTIME 15 mL 0     acetaminophen (TYLENOL) 500 MG tablet Take 500-1,000 mg by mouth every 6 hours as needed for mild pain       lisinopril (PRINIVIL/ZESTRIL) 5 MG tablet TAKE 1 TABLET(5 MG) BY MOUTH DAILY 90 tablet 2     ACCU-CHEK SMARTVIEW test strip USE TO TEST BLOOD SUGAR 4 TO 5 TIMES DAILY OR AS DIRECTED 150 strip 3     insulin aspart (NOVOLOG FLEXPEN) 100 UNIT/ML injection 4 units before breakfast, 4 units before lunch, 4 units before dinner. (Pre meal take 1 unit per carb choice +1 unit of Novolog.)  Plus Correction factor of 1/80/180 pre meal. Pre meal blood glucose 180-260 add 1 unit, blood glucose 261-340 add 2 units, blood glucose 341-420 add 3 units.   maximum units are 25 units per day in total 30 mL 1     pravastatin (PRAVACHOL) 80 MG tablet TAKE 1 TABLET(80 MG) BY MOUTH DAILY 90 tablet 1     insulin pen needle 31G X 6 MM Use 4 daily or as directed. 100 each prn     VIAGRA 100 MG cap/tab TAKE 1 TABLET BY MOUTH DAILY AS NEEDED FOR ERECTILE DYSFUNCTION AT LEAST 30 MINUTES BEFORE INTERCOURSE 8 tablet 0     blood glucose monitoring (ACCU-CHEK FASTCLIX) lancets Use to test blood sugar 2 times daily or as directed.  Ok to substitute alternative if insurance prefers. 1 Box 1     melatonin 1 MG TABS Take 2 mg by mouth nightly as needed for sleep       cetirizine (ZYRTEC) 10 MG tablet Take 10 mg by mouth daily as needed for allergies       aspirin 81 MG tablet Take 1 tablet by mouth daily.  3     [DISCONTINUED] metFORMIN (GLUCOPHAGE) 500 MG tablet TAKE 2 TABLETS BY MOUTH TWICE DAILY WITH MEALS  360 tablet 0     GLUCAGON EMERGENCY 1 MG kit Inject 1 mg Subcutaneous once for 1 dose 1 mg 1     Labs reviewed in EPIC      Reviewed and updated as needed this visit by clinical staff  Tobacco  Allergies  Meds  Med Hx  Surg Hx  Fam Hx  Soc Hx      Reviewed and updated as needed this visit by Provider         ROS:  Constitutional, HEENT, cardiovascular, pulmonary, GI, , musculoskeletal, neuro, skin, endocrine and psych systems are negative, except as otherwise noted.    This document serves as a record of the services and decisions personally performed and made by Curt Schneider MD. It was created on their behalf by Noah Zazueta, a trained medical scribe. The creation of this document is based the provider's statements to the medical scribe.  Noah Zazueta December 21, 2017 10:48 AM    OBJECTIVE:   /64 (BP Location: Right arm, Cuff Size: Adult Regular)  Pulse 73  Temp 97.5  F (36.4  C) (Oral)  Wt 90.9 kg (200 lb 6.4 oz)  SpO2 98%  BMI 24.72 kg/m2  Body mass index is 24.72 kg/(m^2).  GENERAL: healthy, alert and no distress  EYES: Eyes grossly normal to inspection, EOMI, conjunctivae and sclerae normal  RESP: lungs clear to auscultation - no rales, rhonchi or wheezes  CV: regular rate and rhythm, normal S1 S2, no S3 or S4, no murmur, click or rub, no peripheral edema and peripheral pulses strong  SKIN: no suspicious lesions or rashes to visible skin   NEURO: mentation intact and speech normal  PSYCH: mentation appears normal, affect normal/bright    Diagnostic Test Results:  Results for orders placed or performed in visit on 12/12/17   Hemoglobin A1c   Result Value Ref Range    Hemoglobin A1C 6.2 (H) 4.3 - 6.0 %   Comprehensive metabolic panel   Result Value Ref Range    Sodium 141 133 - 144 mmol/L    Potassium 4.6 3.4 - 5.3 mmol/L    Chloride 105 94 - 109 mmol/L    Carbon Dioxide 30 20 - 32 mmol/L    Anion Gap 6 3 - 14 mmol/L    Glucose 107 (H) 70 - 99 mg/dL    Urea Nitrogen 16 7 - 30 mg/dL     Creatinine 1.10 0.66 - 1.25 mg/dL    GFR Estimate 67 >60 mL/min/1.7m2    GFR Estimate If Black 81 >60 mL/min/1.7m2    Calcium 9.3 8.5 - 10.1 mg/dL    Bilirubin Total 0.6 0.2 - 1.3 mg/dL    Albumin 3.9 3.4 - 5.0 g/dL    Protein Total 7.0 6.8 - 8.8 g/dL    Alkaline Phosphatase 61 40 - 150 U/L    ALT 23 0 - 70 U/L    AST 12 0 - 45 U/L   Lipid panel reflex to direct LDL   Result Value Ref Range    Cholesterol 150 <200 mg/dL    Triglycerides 91 <150 mg/dL    HDL Cholesterol 69 >39 mg/dL    LDL Cholesterol Calculated 63 <100 mg/dL    Non HDL Cholesterol 81 <130 mg/dL       ASSESSMENT/PLAN:     (E11.40) Type 2 diabetes mellitus with diabetic neuropathy, without long-term current use of insulin (H)  Comment: he is absolutely current with all diabetes mellitus benchmarks and has had all pre-clinic laboratory studies done . We are continuing current plan of care with standard of care  , aspirin, statin medication and angiotensin converting enzyme inhibitor.   Plan: metFORMIN (GLUCOPHAGE) 500 MG tablet            (Z91.81) At risk for falling  Comment:   Plan:     (Z23) Need for prophylactic vaccination and inoculation against influenza  Comment: already current   Plan: as above     Patient Instructions   - Follow up with me in 6 months.     The information in this document, created by the medical scribe for me, accurately reflects the services I personally performed and the decisions made by me. I have reviewed and approved this document for accuracy.   MD Curt Moncada MD  [facility]

## 2017-12-21 ENCOUNTER — OFFICE VISIT (OUTPATIENT)
Dept: INTERNAL MEDICINE | Facility: CLINIC | Age: 67
End: 2017-12-21
Payer: COMMERCIAL

## 2017-12-21 VITALS
WEIGHT: 200.4 LBS | SYSTOLIC BLOOD PRESSURE: 128 MMHG | TEMPERATURE: 97.5 F | BODY MASS INDEX: 24.72 KG/M2 | HEART RATE: 73 BPM | OXYGEN SATURATION: 98 % | DIASTOLIC BLOOD PRESSURE: 64 MMHG

## 2017-12-21 DIAGNOSIS — E11.40 TYPE 2 DIABETES MELLITUS WITH DIABETIC NEUROPATHY, WITHOUT LONG-TERM CURRENT USE OF INSULIN (H): ICD-10-CM

## 2017-12-21 DIAGNOSIS — Z91.81 AT RISK FOR FALLING: ICD-10-CM

## 2017-12-21 DIAGNOSIS — Z23 NEED FOR PROPHYLACTIC VACCINATION AND INOCULATION AGAINST INFLUENZA: ICD-10-CM

## 2017-12-21 PROCEDURE — 99214 OFFICE O/P EST MOD 30 MIN: CPT | Performed by: INTERNAL MEDICINE

## 2017-12-21 ASSESSMENT — PAIN SCALES - GENERAL: PAINLEVEL: MILD PAIN (3)

## 2017-12-21 NOTE — MR AVS SNAPSHOT
After Visit Summary   12/21/2017    Felipe Campbell    MRN: [id number]           Patient Information     Date Of Birth          1950        Visit Information        Provider Department      12/21/2017 10:30 AM Curt Schneider MD [facility]        Today's Diagnoses     Type 2 diabetes mellitus with diabetic neuropathy, without long-term current use of insulin (H)        At risk for falling        Need for prophylactic vaccination and inoculation against influenza          Care Instructions    - Follow up with me in 6 months.     [facility]    If you have any questions regarding to your visit please contact your care team:     Team Pink:   Clinic Hours Telephone Number   Internal Medicine:  Dr. Michelle Gil, NP       7am-7pm  Monday - Thursday   7am-5pm  Fridays  [phone]  (Appointment scheduling available 24/7)    Questions about your visit?  Team Line  [phone]   Urgent Care - [place] and [place] - 11am-9pm Monday-Friday Saturday-Sunday- 9am-5pm   [place] - 5pm-9pm Monday-Friday Saturday-Sunday- 9am-5pm  [phone] - [place]   [phone] - [place]       What options do I have for visits at the clinic other than the traditional office visit?  To expand how we care for you, many of our providers are utilizing electronic visits (e-visits) and telephone visits, when medically appropriate, for interactions with their patients rather than a visit in the clinic.   We also offer nurse visits for many medical concerns. Just like any other service, we will bill your insurance company for this type of visit based on time spent on the phone with your provider. Not all insurance companies cover these visits. Please check with your medical insurance if this type of visit is covered. You will be responsible for any charges that are not paid by your insurance.      E-visits via Microland:  generally incur a $35.00  fee.  Telephone visits:  Time spent on the phone: *charged based on time that is spent on the phone in increments of 10 minutes. Estimated cost:   5-10 mins $30.00   11-20 mins. $59.00   21-30 mins. $85.00   Use Nuovo Windhart (secure email communication and access to your chart) to send your primary care provider a message or make an appointment. Ask someone on your Team how to sign up for Nuovo Windhart.    For a Price Quote for your services, please call our [facility] at [phone].    As always, Thank you for trusting us with your health care needs!    Celina Mccoy, MAX            Follow-ups after your visit        Who to contact     If you have questions or need follow up information about today's clinic visit or your schedule please contact [facility] directly at [phone].  Normal or non-critical lab and imaging results will be communicated to you by MyChart, letter or phone within 4 business days after the clinic has received the results. If you do not hear from us within 7 days, please contact the clinic through Nuovo Windhart or phone. If you have a critical or abnormal lab result, we will notify you by phone as soon as possible.  Submit refill requests through [employer] or call your pharmacy and they will forward the refill request to us. Please allow 3 business days for your refill to be completed.          Additional Information About Your Visit        MyChart Information     Undeskt gives you secure access to your electronic health record. If you see a primary care provider, you can also send messages to your care team and make appointments. If you have questions, please call your primary care clinic.  If you do not have a primary care provider, please call [phone] and they will assist you.        Care EveryWhere ID     This is your Care EveryWhere ID. This could be used by other organizations to access your [facility] medical records  [id number]        Your Vitals Were     Pulse Temperature  Pulse Oximetry BMI (Body Mass Index)          73 97.5  F (36.4  C) (Oral) 98% 24.72 kg/m2         Blood Pressure from Last 3 Encounters:   12/21/17 128/64   08/31/17 120/72   06/13/17 128/66    Weight from Last 3 Encounters:   12/21/17 200 lb 6.4 oz (90.9 kg)   09/21/17 205 lb (93 kg)   06/13/17 209 lb (94.8 kg)              Today, you had the following     No orders found for display         Today's Medication Changes          These changes are accurate as of: 12/21/17 10:53 AM.  If you have any questions, ask your nurse or doctor.               These medicines have changed or have updated prescriptions.        Dose/Directions    metFORMIN 500 MG tablet   Commonly known as:  GLUCOPHAGE   This may have changed:  See the new instructions.   Used for:  Type 2 diabetes mellitus with diabetic neuropathy, without long-term current use of insulin (H)   Changed by:  Curt Schneider MD        TAKE 2 TABLETS BY MOUTH TWICE DAILY WITH MEALS   Quantity:  360 tablet   Refills:  3            Where to get your medicines      These medications were sent to [id number] - [facility], [address] AT [place] [address]  [address] [place] [address], [facility] [address]     Phone:  [phone]     metFORMIN 500 MG tablet                Primary Care Provider Office Phone # Fax #    Curt Schneider [phone]       [address] [facility] [address]        Equal Access to Services     GINNA NAIR AH: Hadii aad ku hadasho Soomaali, waaxda luqadaha, qaybta kaalmada adeegyada, waxay idiin haymarilou valedz. So [facility] [phone].    ATENCIÓN: Si habla español, tiene a girard disposición servicios gratuitos de asistencia lingüística. Llame al [phone].    We comply with applicable federal civil rights laws and [place] laws. We do not discriminate on the basis of race, color, national origin, age, disability, sex, sexual orientation, or gender identity.            Thank you!     Thank  you for choosing [facility]  for your care. Our goal is always to provide you with excellent care. Hearing back from our patients is one way we can continue to improve our services. Please take a few minutes to complete the written survey that you may receive in the mail after your visit with us. Thank you!             Your Updated Medication List - Protect others around you: Learn how to safely use, store and throw away your medicines at www.disposemymeds.org.          This list is accurate as of: 12/21/17 10:53 AM.  Always use your most recent med list.                   Brand Name Dispense Instructions for use Diagnosis    ACCU-CHEK SMARTVIEW test strip   Generic drug:  blood glucose monitoring     150 strip    USE TO TEST BLOOD SUGAR 4 TO 5 TIMES DAILY OR AS DIRECTED    Type 2 diabetes, controlled, with peripheral neuropathy (H)       acetaminophen 500 MG tablet    TYLENOL     Take 500-1,000 mg by mouth every 6 hours as needed for mild pain        aspirin 81 MG tablet      Take 1 tablet by mouth daily.    Type 2 diabetes, HbA1c goal < 7% (H)       blood glucose monitoring lancets     1 Box    Use to test blood sugar 2 times daily or as directed.  Ok to substitute alternative if insurance prefers.    Type 2 diabetes, controlled, with peripheral neuropathy (H)       cetirizine 10 MG tablet    zyrTEC     Take 10 mg by mouth daily as needed for allergies        GLUCAGON EMERGENCY 1 MG kit   Generic drug:  glucagon     1 mg    Inject 1 mg Subcutaneous once for 1 dose    Type 2 diabetes, controlled, with peripheral neuropathy (H)       insulin aspart 100 UNIT/ML injection    NovoLOG FLEXPEN    30 mL    4 units before breakfast, 4 units before lunch, 4 units before dinner. (Pre meal take 1 unit per carb choice +1 unit of Novolog.)  Plus Correction factor of 1/80/180 pre meal. Pre meal blood glucose 180-260 add 1 unit, blood glucose 261-340 add 2 units, blood glucose 341-420 add 3 units.  maximum units are  25 units per day in total    Type 2 diabetes mellitus with diabetic polyneuropathy, with long-term current use of insulin (H)       insulin pen needle 31G X 6 MM     100 each    Use 4 daily or as directed.    Type 2 diabetes, controlled, with peripheral neuropathy (H)       LANTUS SOLOSTAR 100 UNIT/ML injection   Generic drug:  insulin glargine     15 mL    INJECT 10 UNITS UNDER THE SKIN AT BEDTIME    Type 2 diabetes, controlled, with peripheral neuropathy (H)       lisinopril 5 MG tablet    PRINIVIL/ZESTRIL    90 tablet    TAKE 1 TABLET(5 MG) BY MOUTH DAILY    Type 2 diabetes, controlled, with peripheral neuropathy (H), Microalbuminuria       melatonin 1 MG Tabs tablet      Take 2 mg by mouth nightly as needed for sleep        metFORMIN 500 MG tablet    GLUCOPHAGE    360 tablet    TAKE 2 TABLETS BY MOUTH TWICE DAILY WITH MEALS    Type 2 diabetes mellitus with diabetic neuropathy, without long-term current use of insulin (H)       pravastatin 80 MG tablet    PRAVACHOL    90 tablet    TAKE 1 TABLET(80 MG) BY MOUTH DAILY    Hyperlipidemia LDL goal <100       VIAGRA 100 MG tablet   Generic drug:  sildenafil     8 tablet    TAKE 1 TABLET BY MOUTH DAILY AS NEEDED FOR ERECTILE DYSFUNCTION AT LEAST 30 MINUTES BEFORE INTERCOURSE    Erectile dysfunction of organic origin

## 2017-12-21 NOTE — NURSING NOTE
"Chief Complaint   Patient presents with     Diabetes     follow-up       Initial /64 (BP Location: Right arm, Cuff Size: Adult Regular)  Pulse 73  Temp 97.5  F (36.4  C) (Oral)  Wt 200 lb 6.4 oz (90.9 kg)  SpO2 98%  BMI 24.72 kg/m2 Estimated body mass index is 24.72 kg/(m^2) as calculated from the following:    Height as of 6/13/17: 6' 3.5\" (1.918 m).    Weight as of this encounter: 200 lb 6.4 oz (90.9 kg).  Medication Reconciliation: complete       Celina Mccoy CMA    "

## 2017-12-21 NOTE — PATIENT INSTRUCTIONS
- Follow up with me in 6 months.     [facility]    If you have any questions regarding to your visit please contact your care team:     Team Pink:   Clinic Hours Telephone Number   Internal Medicine:  Dr. Michelle Gil, NP       7am-7pm  Monday - Thursday   7am-5pm  Fridays  [phone]  (Appointment scheduling available 24/7)    Questions about your visit?  Team Line  [phone]   Urgent Care - [place] - 11am-9pm Monday-Friday Saturday-Sunday- 9am-5pm   [place] - 5pm-9pm Monday-Friday Saturday-Sunday- 9am-5pm  [phone] - [place]   [phone] - [place]       What options do I have for visits at the clinic other than the traditional office visit?  To expand how we care for you, many of our providers are utilizing electronic visits (e-visits) and telephone visits, when medically appropriate, for interactions with their patients rather than a visit in the clinic.   We also offer nurse visits for many medical concerns. Just like any other service, we will bill your insurance company for this type of visit based on time spent on the phone with your provider. Not all insurance companies cover these visits. Please check with your medical insurance if this type of visit is covered. You will be responsible for any charges that are not paid by your insurance.      E-visits via [employer]:  generally incur a $35.00 fee.  Telephone visits:  Time spent on the phone: *charged based on time that is spent on the phone in increments of 10 minutes. Estimated cost:   5-10 mins $30.00   11-20 mins. $59.00   21-30 mins. $85.00   Use [employer] (secure email communication and access to your chart) to send your primary care provider a message or make an appointment. Ask someone on your Team how to sign up for [employer].    For a Price Quote for your services, please call our Consumer Price Line at [phone].    As always, Thank you for trusting us  with your health care needs!    Celina Mccoy, CMA

## 2017-12-27 DIAGNOSIS — E11.42 TYPE 2 DIABETES, CONTROLLED, WITH PERIPHERAL NEUROPATHY (H): ICD-10-CM

## 2017-12-27 RX ORDER — BLOOD SUGAR DIAGNOSTIC
STRIP MISCELLANEOUS
Qty: 450 STRIP | Refills: 3 | Status: SHIPPED | OUTPATIENT
Start: 2017-12-27 | End: 2018-03-22

## 2018-01-14 DIAGNOSIS — E78.5 HYPERLIPIDEMIA LDL GOAL <100: ICD-10-CM

## 2018-01-16 RX ORDER — PRAVASTATIN SODIUM 80 MG/1
80 TABLET ORAL DAILY
Qty: 90 TABLET | Refills: 1 | Status: SHIPPED | OUTPATIENT
Start: 2018-01-16 | End: 2018-07-03

## 2018-01-16 NOTE — TELEPHONE ENCOUNTER
Signed Prescriptions:                        Disp   Refills    pravastatin (PRAVACHOL) 80 MG tablet       90 tab*1        Sig: Take 1 tablet (80 mg) by mouth daily  Authorizing Provider: SEBASTIAN DAMON  Ordering User: AURA BUITRAGO    Medication filled per [facility] protocol.     Aura Buitrago RN

## 2018-02-18 ENCOUNTER — MYC MEDICAL ADVICE (OUTPATIENT)
Dept: INTERNAL MEDICINE | Facility: CLINIC | Age: 68
End: 2018-02-18

## 2018-02-18 DIAGNOSIS — N52.9 ERECTILE DYSFUNCTION OF ORGANIC ORIGIN: Primary | ICD-10-CM

## 2018-02-19 RX ORDER — TADALAFIL 20 MG/1
20 TABLET ORAL DAILY PRN
Qty: 6 TABLET | Refills: 11 | Status: SHIPPED | OUTPATIENT
Start: 2018-02-19 | End: 2018-10-09

## 2018-03-22 ENCOUNTER — OFFICE VISIT (OUTPATIENT)
Dept: PHARMACY | Facility: CLINIC | Age: 68
End: 2018-03-22
Payer: COMMERCIAL

## 2018-03-22 VITALS — SYSTOLIC BLOOD PRESSURE: 116 MMHG | DIASTOLIC BLOOD PRESSURE: 66 MMHG

## 2018-03-22 DIAGNOSIS — J30.1 HAYFEVER: ICD-10-CM

## 2018-03-22 DIAGNOSIS — E78.5 HYPERLIPIDEMIA LDL GOAL <100: ICD-10-CM

## 2018-03-22 DIAGNOSIS — E11.42 TYPE 2 DIABETES MELLITUS WITH DIABETIC POLYNEUROPATHY, WITH LONG-TERM CURRENT USE OF INSULIN (H): Primary | ICD-10-CM

## 2018-03-22 DIAGNOSIS — R80.9 MICROALBUMINURIA: ICD-10-CM

## 2018-03-22 DIAGNOSIS — N52.9 ERECTILE DYSFUNCTION OF ORGANIC ORIGIN: ICD-10-CM

## 2018-03-22 DIAGNOSIS — Z79.4 TYPE 2 DIABETES MELLITUS WITH DIABETIC POLYNEUROPATHY, WITH LONG-TERM CURRENT USE OF INSULIN (H): Primary | ICD-10-CM

## 2018-03-22 DIAGNOSIS — G47.00 INSOMNIA, UNSPECIFIED TYPE: ICD-10-CM

## 2018-03-22 DIAGNOSIS — M17.0 OSTEOARTHRITIS OF BOTH KNEES, UNSPECIFIED OSTEOARTHRITIS TYPE: ICD-10-CM

## 2018-03-22 PROCEDURE — 99606 MTMS BY PHARM EST 15 MIN: CPT | Performed by: PHARMACIST

## 2018-03-22 PROCEDURE — 99607 MTMS BY PHARM ADDL 15 MIN: CPT | Performed by: PHARMACIST

## 2018-03-22 RX ORDER — BLOOD-GLUCOSE METER
1 EACH MISCELLANEOUS ONCE
Qty: 1 KIT | Refills: 0 | COMMUNITY
Start: 2018-03-22 | End: 2018-03-22

## 2018-03-22 NOTE — PROGRESS NOTES
SUBJECTIVE/OBJECTIVE:                Felipe Campblel is a 67 year old male coming in for a follow-up visit for Medication Therapy Management.  He was referred to me from Dr. Schneider.     Chief Complaint: Follow up from our visit on 8/31/17.   His main concern today is regarding changing insurance plans/medications in the coming months (see below).    Tobacco: History of tobacco dependence - quit 1979  Alcohol: Less than 1 beverages / week    Social history:  His wife will be retiring on 5/1/18, so they'll have health insurance through the end of May.  They're going to go with [facility] starting 6/1/18.    Medication Adherence/Access: no issues reported    Diabetes:  Pt currently taking metformin 1000mg BID, Lantus 10 units daily and Novolog 4-5 units TID with meals (generally 1 unit per carb choice plus one additional unit)  Pt is not experiencing side effects.  His current insurance changed formularies this year and they're now preferring Basaglar.  He has enough Lantus to get him a while longer, but not enough to last until he changes to [employer] on 6/1.  [facility] prefers Lantus.  He also expresses concern about his glucometer - neither his current insurance or One[employer] are preferring Accu-Check (they both prefer OneTouch).    SMBG: TID.   Ranges (per patient's glucometer): 7 day avg 93mg/dL  Symptoms of low blood sugar? none. Frequency of hypoglycemia? Never.  He does have glucagon available if needed, and his wife is familiar with administration  Recent symptoms of high blood sugar? none  Eye exam: up to date  Foot exam: up to date  Microalbumin is not < 30 mg/g. Pt is taking an ACEi/ARB.  Aspirin: Taking 81mg daily and denies side effects    Microalbuminuria:  Pt is taking lisinopril 5mg daily.  He denies side effects of therapyworks.    Hyperlipidemia: Current therapy includes pravastatin 80mg once daily.  Pt reports no significant myalgias or other side effects.   The 10-year ASCVD risk score (Aniya PARVEEN Jr, et  al., 2013) is: 18.6%    Values used to calculate the score:      Age: 67 years      Sex: Male      Is Non- : No      Diabetic: Yes      Tobacco smoker: No      Systolic Blood Pressure: 128 mmHg      Is BP treated: Yes      HDL Cholesterol: 69 mg/dL      Total Cholesterol: 150 mg/dL     Insomnia: He continues using melatonin as needed, which he finds effective.  He generally takes 2mg when needed and denies side effects.    ED:  Viagra was recently changed to Cialis 20mg per insurance formulary changes.  He's found this equally as effective as Viagra.  He denies side effects of therapy.    Arthritis:  He's been taking APAP 1000mg PRN knee pain, and has found this effective.  He denies side effects.  He's aware of maximum APAP dosing/day.    Allergies:  He continues taking Zyrtec 10mg daily PRN, which he finds effective.  He denies side effects.    Current labs include:  Today's Vitals: /66     BP Readings from Last 3 Encounters:   12/21/17 128/64   08/31/17 120/72   06/13/17 128/66     Lab Results   Component Value Date    A1C 6.2 12/12/2017   .  Lab Results   Component Value Date    CHOL 150 12/12/2017     Lab Results   Component Value Date    TRIG 91 12/12/2017     Lab Results   Component Value Date    HDL 69 12/12/2017     Lab Results   Component Value Date    LDL 63 12/12/2017       Liver Function Studies -   Recent Labs   Lab Test  12/12/17   0709   PROTTOTAL  7.0   ALBUMIN  3.9   BILITOTAL  0.6   ALKPHOS  61   AST  12   ALT  23       Lab Results   Component Value Date    UCRR 134 06/13/2017    MICROL 47 06/13/2017    UMALCR 35.22 (H) 06/13/2017       Last Basic Metabolic Panel:  Lab Results   Component Value Date     12/12/2017      Lab Results   Component Value Date    POTASSIUM 4.6 12/12/2017     Lab Results   Component Value Date    CHLORIDE 105 12/12/2017     Lab Results   Component Value Date    BUN 16 12/12/2017     Lab Results   Component Value Date    CR 1.10  12/12/2017     GFR Estimate   Date Value Ref Range Status   12/12/2017 67 >60 mL/min/1.7m2 Final     Comment:     Non  GFR Calc   06/13/2017 79 >60 mL/min/1.7m2 Final     Comment:     Non  GFR Calc   11/30/2016 79 >60 mL/min/1.7m2 Final     Comment:     Non  GFR Calc       TSH   Date Value Ref Range Status   05/27/2016 2.39 0.40 - 4.00 mU/L Final   ]    Most Recent Immunizations   Administered Date(s) Administered     Influenza (IIV3) PF 11/06/2008     Influenza Vaccine, 3 YRS +, IM (QUADRIVALENT W/PRESERVATIVES) 10/01/2015     Pneumo Conj 13-V (2010&after) 12/14/2015     Pneumococcal 23 valent 11/06/2008     TD (ADULT, 7+) 07/18/2008     TDAP Vaccine (Adacel) 06/12/1997     Zoster vaccine, live 08/02/2013       ASSESSMENT:              Current medications were reviewed today as discussed above.      Medication Adherence: excellent, no issues identified    Diabetes: Needs Improvement. Patient is meeting A1c goal of < 7%. Self monitoring of blood glucose is at goal of fasting  mg/dL and post prandial < 180 mg/dL.  He needs a new glucometer, both his current insurance and [employer] both prefer OneTouch so I can get him set up with this today.  He still has some test strips/lancets left for his Accu-Check meter so doesn't want an Rx for test strips/lancets for the new glucometer today.  He'll need to change to Basaglar once current Lantus supply is gone, and then change back to Lantus for his next fill after he's on [employer].  I can help arrange this for him.     Microalbuminuria: Stable.    Hyperlipidemia: Stable. Pt is on moderate intensity statin which is indicated based on 2013 ACC/AHA guidelines for lipid management.  Further increase in statin intensity is not necessary as LDL is already quite low.     Insomnia: Stable.    ED: Stable.    Arthritis:  Stable.    Allergies:  Stable.     PLAN:                  1.  I provided you with a One Touch Verio Flex glucometer.   The strips/lancets should be covered under [employer] and [employer].  Let me know when you want me to send the prescriptions for test strips and lancets.  2.  We can change Lantus to Basaglar for your next refill, and then change back to Lantus once you're on [employer].  We wouldn't expect any differences in dosing/blood sugars with this change.  Let me know when you need a prescription.    I spent 45 minutes with this patient today. All changes were made via collaborative practice agreement with Curt Schneider. A copy of the visit note was provided to the patient's primary care provider.     Will follow up in 6 months, he'll contact me via [employer] when he needs new Rx's.    The patient was given a summary of these recommendations as an after visit summary.    Emmanuelle Henry, PharmD, [facility]  Medication Therapy Management Provider  Pager: [phone]

## 2018-03-22 NOTE — PATIENT INSTRUCTIONS
Recommendations from today's MTM visit:                                                    MTM (medication therapy management) is a service provided by a clinical pharmacist designed to help you get the most of out of your medicines.   Today we reviewed what your medicines are for, how to know if they are working, that your medicines are safe and how to make your medicine regimen as easy as possible.     1.  I provided you with a One Touch Verio Flex glucometer.  The test strips/lancets should be covered under both [employer] and [employer].  Let me know when you want me to send the prescriptions for test strips and lancets.    2.  We can change Lantus to Basaglar for your next refill, and then change back to Lantus once you're on [employer].  We wouldn't expect any differences in dosing/blood sugars with this change.  Let me know when you need a prescription.    Next MTM visit:  6 months; but let me know sooner what prescriptions you need.  Feel free to call or myChart    To schedule another MTM appointment, please call the clinic directly or you may call the MTM scheduling line at [phone] or toll-free at [phone].     My Clinical Pharmacist's contact information:                                                      It was a pleasure seeing you today!  Please feel free to contact me with any questions or concerns you have.      Emmanuelle Henry, Thad, [facility]  Medication Therapy Management Provider  Pager: [phone]     You may receive a survey about the MTM services you received.  I would appreciate your feedback to help me serve you better in the future. Please fill it out and return it when you can. Your comments will be anonymous.

## 2018-06-22 ENCOUNTER — MYC MEDICAL ADVICE (OUTPATIENT)
Dept: FAMILY MEDICINE | Facility: CLINIC | Age: 68
End: 2018-06-22

## 2018-06-22 DIAGNOSIS — R80.9 MICROALBUMINURIA: ICD-10-CM

## 2018-06-22 DIAGNOSIS — Z79.4 TYPE 2 DIABETES MELLITUS WITH DIABETIC POLYNEUROPATHY, WITH LONG-TERM CURRENT USE OF INSULIN (H): Primary | ICD-10-CM

## 2018-06-22 DIAGNOSIS — E11.42 TYPE 2 DIABETES MELLITUS WITH DIABETIC POLYNEUROPATHY, WITH LONG-TERM CURRENT USE OF INSULIN (H): Primary | ICD-10-CM

## 2018-06-22 DIAGNOSIS — Z13.29 SCREENING FOR HYPOTHYROIDISM: ICD-10-CM

## 2018-06-22 DIAGNOSIS — N18.2 CKD (CHRONIC KIDNEY DISEASE) STAGE 2, GFR 60-89 ML/MIN: ICD-10-CM

## 2018-06-22 DIAGNOSIS — E78.5 HYPERLIPIDEMIA LDL GOAL <100: ICD-10-CM

## 2018-06-22 NOTE — TELEPHONE ENCOUNTER
Please place any previsit labs needed for 6/27/18 lab appointment (OV is on 7/2/18)    Jacek Thurston RN

## 2018-06-23 ENCOUNTER — DOCUMENTATION ONLY (OUTPATIENT)
Dept: LAB | Facility: CLINIC | Age: 68
End: 2018-06-23

## 2018-06-23 NOTE — PROGRESS NOTES
Patient has an upcoming previsit appointment on 06/27/2018. Please review pended orders and add additional orders if needed.     Thank you,   Concepción Coker

## 2018-06-27 DIAGNOSIS — N18.2 CKD (CHRONIC KIDNEY DISEASE) STAGE 2, GFR 60-89 ML/MIN: ICD-10-CM

## 2018-06-27 DIAGNOSIS — E78.5 HYPERLIPIDEMIA LDL GOAL <100: ICD-10-CM

## 2018-06-27 DIAGNOSIS — E11.42 TYPE 2 DIABETES MELLITUS WITH DIABETIC POLYNEUROPATHY, WITH LONG-TERM CURRENT USE OF INSULIN (H): ICD-10-CM

## 2018-06-27 DIAGNOSIS — R80.9 MICROALBUMINURIA: ICD-10-CM

## 2018-06-27 DIAGNOSIS — Z79.4 TYPE 2 DIABETES MELLITUS WITH DIABETIC POLYNEUROPATHY, WITH LONG-TERM CURRENT USE OF INSULIN (H): ICD-10-CM

## 2018-06-27 DIAGNOSIS — Z13.29 SCREENING FOR HYPOTHYROIDISM: ICD-10-CM

## 2018-06-27 LAB
ANION GAP SERPL CALCULATED.3IONS-SCNC: 8 MMOL/L (ref 3–14)
BUN SERPL-MCNC: 17 MG/DL (ref 7–30)
CALCIUM SERPL-MCNC: 9.1 MG/DL (ref 8.5–10.1)
CHLORIDE SERPL-SCNC: 104 MMOL/L (ref 94–109)
CO2 SERPL-SCNC: 27 MMOL/L (ref 20–32)
CREAT SERPL-MCNC: 0.98 MG/DL (ref 0.66–1.25)
CREAT UR-MCNC: 125 MG/DL
GFR SERPL CREATININE-BSD FRML MDRD: 76 ML/MIN/1.7M2
GLUCOSE SERPL-MCNC: 96 MG/DL (ref 70–99)
HBA1C MFR BLD: 6.1 % (ref 0–5.6)
HGB BLD-MCNC: 14 G/DL (ref 13.3–17.7)
MICROALBUMIN UR-MCNC: 22 MG/L
MICROALBUMIN/CREAT UR: 17.2 MG/G CR (ref 0–17)
POTASSIUM SERPL-SCNC: 4.1 MMOL/L (ref 3.4–5.3)
SODIUM SERPL-SCNC: 139 MMOL/L (ref 133–144)
TSH SERPL DL<=0.005 MIU/L-ACNC: 2.58 MU/L (ref 0.4–4)

## 2018-06-27 PROCEDURE — 85018 HEMOGLOBIN: CPT | Performed by: INTERNAL MEDICINE

## 2018-06-27 PROCEDURE — 82043 UR ALBUMIN QUANTITATIVE: CPT | Performed by: INTERNAL MEDICINE

## 2018-06-27 PROCEDURE — 84443 ASSAY THYROID STIM HORMONE: CPT | Performed by: INTERNAL MEDICINE

## 2018-06-27 PROCEDURE — 80048 BASIC METABOLIC PNL TOTAL CA: CPT | Performed by: INTERNAL MEDICINE

## 2018-06-27 PROCEDURE — 36415 COLL VENOUS BLD VENIPUNCTURE: CPT | Performed by: INTERNAL MEDICINE

## 2018-06-27 PROCEDURE — 83036 HEMOGLOBIN GLYCOSYLATED A1C: CPT | Performed by: INTERNAL MEDICINE

## 2018-06-29 NOTE — PROGRESS NOTES
SUBJECTIVE:   Felipe Campbell is a 68 year old male who presents to clinic today for the following health issues:       Type 2 diabetes mellitus with diabetic neuropathy, without long-term current use of insulin (H)  Screening for diabetic peripheral neuropathy  Need for prophylactic vaccination with tetanus-diphtheria (TD)  Microalbuminuria  Erectile dysfunction of organic origin  Hyperlipidemia LDL goal <100     Lab Results   Component Value Date    A1C 6.1 06/27/2018    A1C 6.2 12/12/2017    A1C 6.8 06/13/2017    A1C 8.1 12/06/2016    A1C 7.2 05/27/2016     Patient is active on [employer] .     Wt Readings from Last 5 Encounters:   07/03/18 207 lb (93.9 kg)   12/21/17 200 lb 6.4 oz (90.9 kg)   09/21/17 205 lb (93 kg)   06/13/17 209 lb (94.8 kg)   03/30/17 226 lb 8 oz (102.7 kg)     BP Readings from Last 6 Encounters:   07/03/18 106/64   03/22/18 116/66   12/21/17 128/64   08/31/17 120/72   06/13/17 128/66   12/06/16 132/72     We did pre-clinic laboratory studies which were all reviewed by patient prior to this appointment. He's doing fabulously !    Exercising, down 20 pounds since march 2017. He's active with the [employer] program     Using about a total daily dose of novolog (ultra-short acting insulin aspart) at 14 units per day in divided dosages.    Viagra ( Sildenafil citrate ) was changed to Cialis (tadalafil) due to health insurance reasons. Patient wants to go back and we had the detailed discussion about the Revatio (Sildenafil Citrate) option.    Diabetes Follow-up    Patient is checking blood sugars: four times daily.  - he's seen a few nighttime blood glucose readings of 50's. Good one touch  Blood sugar testing frequency justification: Frequent hypoglycemia and Risk of hypoglycemia with medication(s)  Results are as follows:         Average of 109     Diabetic concerns: None     Symptoms of hypoglycemia (low blood sugar): none     Paresthesias (numbness or burning in feet) or sores: Yes       Date of last diabetic eye exam: last fall     BP Readings from Last 2 Encounters:   03/22/18 116/66   12/21/17 128/64     Hemoglobin A1C (%)   Date Value   06/27/2018 6.1 (H)   12/12/2017 6.2 (H)     LDL Cholesterol Calculated (mg/dL)   Date Value   12/12/2017 63   11/30/2016 95       Diabetes Management Resources    Amount of exercise or physical activity: 2-3 days/week for an average of 45-60 minutes    Problems taking medications regularly: No    Medication side effects: none    Diet: low salt and carbohydrate counting          Problem list and histories reviewed & adjusted, as indicated.  Additional history: as documented    Patient Active Problem List   Diagnosis     Erectile dysfunction of organic origin     HYPERLIPIDEMIA LDL GOAL <100     Microalbuminuria     Advanced directives, counseling/discussion     Hayfever     Type 2 diabetes mellitus with diabetic polyneuropathy, with long-term current use of insulin (H)     CKD (chronic kidney disease) stage 2, GFR 60-89 ml/min     Past Surgical History:   Procedure Laterality Date     C APPENDECTOMY  07/20/69     COLONOSCOPY WITH CO2 INSUFFLATION N/A 11/8/2016    Procedure: COLONOSCOPY WITH CO2 INSUFFLATION;  Surgeon: Torey Almazan MD;  Location: [facility] OR     HERNIA REPAIR, INGUINAL RT/LT  age 5    left     TUNA         Social History   Substance Use Topics     Smoking status: Former Smoker     Types: Cigarettes     Quit date: 1/1/1979     Smokeless tobacco: Never Used     Alcohol use Yes      Comment: occasionally     Family History   Problem Relation Age of Onset     Breast Cancer Mother      Diabetes Father      Diabetes Maternal Grandmother      Congenital Anomalies Sister      Psychotic Disorder Daughter          Current Outpatient Prescriptions   Medication Sig Dispense Refill     acetaminophen (TYLENOL) 500 MG tablet Take 500-1,000 mg by mouth every 6 hours as needed for mild pain       aspirin 81 MG tablet Take 1 tablet by mouth daily.  3     B-D U/F  insulin pen needle USE 4 DAILY OR AS DIRECTED 100 each 1     blood glucose monitoring (ONETOUCH VERIO IQ) test strip Use to test blood sugar 4 times daily or as directed. 400 each 3     cetirizine (ZYRTEC) 10 MG tablet Take 10 mg by mouth daily as needed for allergies       insulin glargine (LANTUS SOLOSTAR) 100 UNIT/ML pen Inject 10 units under the skin daily. 15 mL 1     insulin pen needle 31G X 6 MM Use 4 daily or as directed. 100 each prn     lisinopril (PRINIVIL/ZESTRIL) 5 MG tablet TAKE 1 TABLET(5 MG) BY MOUTH DAILY 90 tablet 1     melatonin 1 MG TABS Take 2 mg by mouth nightly as needed for sleep       metFORMIN (GLUCOPHAGE) 500 MG tablet TAKE 2 TABLETS BY MOUTH TWICE DAILY WITH MEALS 360 tablet 3     NOVOLOG FLEXPEN 100 UNIT/ML soln [id number] BEFORE BREAKFAST, 4UNITS LUNCH, 4 UNITS BEFORE DINNER PLUS CORRECTION FACTOR OF 1/80/180 PER MEAL. MAX 25 UNTS/DAY 30 mL 1     pravastatin (PRAVACHOL) 80 MG tablet Take 1 tablet (80 mg) by mouth daily 90 tablet 1     tadalafil (CIALIS) 20 MG tablet Take 1 tablet (20 mg) by mouth daily as needed prior to sex. Do not use with nitroglycerin, terazosin or doxazosin. 6 tablet 11     GLUCAGON EMERGENCY 1 MG kit Inject 1 mg Subcutaneous once for 1 dose 1 mg 1     Allergies   Allergen Reactions     Lipitor [Atorvastatin Calcium]      Rash from lipitor     Hay Fever & [A.R.M.]      Recent Labs   Lab Test  06/27/18   0731  12/12/17   0709  06/13/17   1019   11/30/16   0732  05/27/16   0801   03/14/14   0726   A1C  6.1*  6.2*  6.8*   < >   --   7.2*   < >  8.4*   LDL   --   63   --    --   95  74   < >  119   HDL   --   69   --    --   57  54   < >  44   TRIG   --   91   --    --   154*  136   < >  248*   ALT   --   23   --    --   26   --    --   40   CR  0.98  1.10  0.96   --   0.96  1.06   < >  0.91   GFRESTIMATED  76  67  79   --   79  70   < >  85   GFRESTBLACK  >90  81  >90   GFR Calc     --   >90   GFR Calc    85   < >  >90   POTASSIUM   "4.1  4.6  4.0   --   4.4  3.7   < >  4.1   TSH  2.58   --    --    --    --   2.39   < >   --     < > = values in this interval not displayed.      BP Readings from Last 3 Encounters:   07/03/18 106/64   03/22/18 116/66   12/21/17 128/64    Wt Readings from Last 3 Encounters:   07/03/18 207 lb (93.9 kg)   12/21/17 200 lb 6.4 oz (90.9 kg)   09/21/17 205 lb (93 kg)                  Labs reviewed in EPIC    Reviewed and updated as needed this visit by clinical staff       Reviewed and updated as needed this visit by Provider         ROS:  Constitutional, HEENT, cardiovascular, pulmonary, gi and gu systems are negative, except as otherwise noted.    OBJECTIVE:                                                    /64  Pulse 65  Temp 97.7  F (36.5  C) (Oral)  Resp 16  Ht 6' 3.75\" (1.924 m)  Wt 207 lb (93.9 kg)  SpO2 97%  BMI 25.36 kg/m2  Body mass index is 25.36 kg/(m^2).  GENERAL APPEARANCE: healthy, alert and no distress  EYES: Eyes grossly normal to inspection, PERRL and conjunctivae and sclerae normal  RESP: lungs clear to auscultation - no rales, rhonchi or wheezes  CV: regular rates and rhythm, normal S1 S2, no S3 or S4 and no murmur, click or rub    Diagnostic test results:  Diagnostic Test Results:  Orders Placed This Encounter   Procedures     FOOT EXAM  NO CHARGE [[id number]]     **A1C FUTURE anytime          ASSESSMENT/PLAN:                                                    1. Screening for diabetic peripheral neuropathy  Patient has stable, mild, chronic peripheral neuropathy . Self foot examinations are emphasized    - FOOT EXAM  NO CHARGE [[id number]]    2. Need for prophylactic vaccination with tetanus-diphtheria (TD)  We did not do this today and will need to wait until next appointment.    3. Type 2 diabetes, controlled, with peripheral neuropathy (H)  Reduce Lantus to 6 units with a recheck hemoglobin a1c  [ diabetes test ] in 3 months .   Continue with good home blood glucose monitoring ,  We " have an eye towards complete insulin cessation if at all possible , depending on how things go   - lisinopril (PRINIVIL/ZESTRIL) 5 MG tablet;   Dispense: 90 tablet; Refill: 1    4. Microalbuminuria  Well controlled   - lisinopril (PRINIVIL/ZESTRIL) 5 MG tablet;   Dispense: 90 tablet; Refill: 1    5. Type 2 diabetes mellitus with diabetic neuropathy, without long-term current use of insulin (H)  Well controlled and we cut back on Lantus (Insulin Glargine) as detailed above   - metFORMIN (GLUCOPHAGE) 500 MG tablet; TAKE 2 TABLETS BY MOUTH TWICE DAILY WITH MEALS  Dispense: 360 tablet; Refill: 3      Follow up with Provider - 3 months hemoglobin a1c  [ diabetes test ], 6 months face to face encounter      Curt Schneider MD  [facility]

## 2018-07-03 ENCOUNTER — OFFICE VISIT (OUTPATIENT)
Dept: FAMILY MEDICINE | Facility: CLINIC | Age: 68
End: 2018-07-03
Payer: COMMERCIAL

## 2018-07-03 VITALS
HEIGHT: 76 IN | HEART RATE: 65 BPM | TEMPERATURE: 97.7 F | RESPIRATION RATE: 16 BRPM | DIASTOLIC BLOOD PRESSURE: 64 MMHG | OXYGEN SATURATION: 97 % | BODY MASS INDEX: 25.21 KG/M2 | SYSTOLIC BLOOD PRESSURE: 106 MMHG | WEIGHT: 207 LBS

## 2018-07-03 DIAGNOSIS — E78.5 HYPERLIPIDEMIA LDL GOAL <100: ICD-10-CM

## 2018-07-03 DIAGNOSIS — Z13.89 SCREENING FOR DIABETIC PERIPHERAL NEUROPATHY: ICD-10-CM

## 2018-07-03 DIAGNOSIS — R80.9 MICROALBUMINURIA: ICD-10-CM

## 2018-07-03 DIAGNOSIS — E11.40 TYPE 2 DIABETES MELLITUS WITH DIABETIC NEUROPATHY, WITHOUT LONG-TERM CURRENT USE OF INSULIN (H): Primary | ICD-10-CM

## 2018-07-03 DIAGNOSIS — Z23 NEED FOR PROPHYLACTIC VACCINATION WITH TETANUS-DIPHTHERIA (TD): ICD-10-CM

## 2018-07-03 DIAGNOSIS — N52.9 ERECTILE DYSFUNCTION OF ORGANIC ORIGIN: ICD-10-CM

## 2018-07-03 PROCEDURE — 99214 OFFICE O/P EST MOD 30 MIN: CPT | Performed by: INTERNAL MEDICINE

## 2018-07-03 PROCEDURE — 99207 C FOOT EXAM  NO CHARGE: CPT | Performed by: INTERNAL MEDICINE

## 2018-07-03 RX ORDER — SILDENAFIL CITRATE 20 MG/1
TABLET ORAL
Qty: 30 TABLET | Refills: 11 | Status: SHIPPED | OUTPATIENT
Start: 2018-07-03 | End: 2019-11-19

## 2018-07-03 RX ORDER — PRAVASTATIN SODIUM 80 MG/1
80 TABLET ORAL DAILY
Qty: 90 TABLET | Refills: 1 | Status: SHIPPED | OUTPATIENT
Start: 2018-07-03 | End: 2019-01-01

## 2018-07-03 RX ORDER — LISINOPRIL 5 MG/1
TABLET ORAL
Qty: 90 TABLET | Refills: 1 | Status: SHIPPED | OUTPATIENT
Start: 2018-07-03 | End: 2019-05-13

## 2018-07-03 NOTE — PATIENT INSTRUCTIONS
We reduced the Lantus (Insulin Glargine) dose to only 6 units and will recommended just the labwork is needed for a recheck hemoglobin a1c  [ diabetes test ] in 3 months . Further follow up depending on how things go !      Curt Schneider MD          [facility]    If you have any questions regarding to your visit please contact your care team:     Team Pink:   Clinic Hours Telephone Number   Internal Medicine:  Dr. Michelle Gil, NP       7am-7pm  Monday - Thursday   7am-5pm  Fridays  [phone]  (Appointment scheduling available 24/7)    Questions about your recent visit?  Team Line  [phone]   Urgent Care - [place] - 11am-9pm Monday-Friday Saturday-Sunday- 9am-5pm   [place] - 5pm-9pm Monday-Friday Saturday-Sunday- 9am-5pm  [phone] - [place]  [phone] - [place]       What options do I have for a visit other than an office visit? We offer electronic visits (e-visits) and telephone visits, when medically appropriate.  Please check with your medical insurance to see if these types of visits are covered, as you will be responsible for any charges that are not paid by your insurance.      You can use [employer] (secure electronic communication) to access to your chart, send your primary care provider a message, or make an appointment. Ask a team member how to get started.     For a price quote for your services, please call our Consumer Price Line at [phone] or our Imaging Cost estimation line at [phone] (for imaging tests).  Eva TERESA CMA ([facility])

## 2018-07-03 NOTE — MR AVS SNAPSHOT
After Visit Summary   7/3/2018    Felipe Campbell    MRN: [id number]           Patient Information     Date Of Birth          1950        Visit Information        Provider Department      7/3/2018 8:50 AM Curt Schneider MD [facility]        Today's Diagnoses     Type 2 diabetes mellitus with diabetic neuropathy, without long-term current use of insulin (H)    -  1    Screening for diabetic peripheral neuropathy        Need for prophylactic vaccination with tetanus-diphtheria (TD)        Microalbuminuria        Erectile dysfunction of organic origin        Hyperlipidemia LDL goal <100          Care Instructions    We reduced the Lantus (Insulin Glargine) dose to only 6 units and will recommended just the labwork is needed for a recheck hemoglobin a1c  [ diabetes test ] in 3 months . Further follow up depending on how things go !      Curt Schneider MD          [facility]    If you have any questions regarding to your visit please contact your care team:     Team Pink:   Clinic Hours Telephone Number   Internal Medicine:  Dr. Michelle Gil NP       7am-7pm  Monday - Thursday   7am-5pm  Fridays  [phone]  (Appointment scheduling available 24/7)    Questions about your recent visit?  Team Line  [phone]   Urgent Care - [place] - 11am-9pm Monday-Friday Saturday-Sunday- 9am-5pm   [place] - 5pm-9pm Monday-Friday Saturday-Sunday- 9am-5pm  [phone] - [place]  [phone] - [place]       What options do I have for a visit other than an office visit? We offer electronic visits (e-visits) and telephone visits, when medically appropriate.  Please check with your medical insurance to see if these types of visits are covered, as you will be responsible for any charges that are not paid by your insurance.      You can use [employer] (secure electronic communication) to access to your chart, send your  "primary care provider a message, or make an appointment. Ask a team member how to get started.     For a price quote for your services, please call our Consumer Price Line at [phone] or our Imaging Cost estimation line at [phone] (for imaging tests).  Eva TERESA CMA ([facility])            Follow-ups after your visit        Future tests that were ordered for you today     Open Future Orders        Priority Expected Expires Ordered    **A1C FUTURE anytime Routine 7/3/2018 7/3/2019 7/3/2018            Who to contact     If you have questions or need follow up information about today's clinic visit or your schedule please contact [facility] directly at [phone].  Normal or non-critical lab and imaging results will be communicated to you by [employer], letter or phone within 4 business days after the clinic has received the results. If you do not hear from us within 7 days, please contact the clinic through [employer] or phone. If you have a critical or abnormal lab result, we will notify you by phone as soon as possible.  Submit refill requests through Mood[employer] or call your pharmacy and they will forward the refill request to us. Please allow 3 business days for your refill to be completed.          Additional Information About Your Visit        Mood[employer] Information     M[employer] gives you secure access to your electronic health record. If you see a primary care provider, you can also send messages to your care team and make appointments. If you have questions, please call your primary care clinic.  If you do not have a primary care provider, please call [phone] and they will assist you.        Care EveryWhere ID     This is your Care EveryWhere ID. This could be used by other organizations to access your [place] medical records  [id number]        Your Vitals Were     Pulse Temperature Respirations Height Pulse Oximetry BMI (Body Mass Index)    65 97.7  F (36.5  C) (Oral) 16 6' 3.75\" (1.924 m) 97% " "25.36 kg/m2       Blood Pressure from Last 3 Encounters:   07/03/18 106/64   03/22/18 116/66   12/21/17 128/64    Weight from Last 3 Encounters:   07/03/18 207 lb (93.9 kg)   12/21/17 200 lb 6.4 oz (90.9 kg)   09/21/17 205 lb (93 kg)              We Performed the Following     FOOT EXAM  NO CHARGE [75619.114]          Today's Medication Changes          These changes are accurate as of 7/3/18  9:49 AM.  If you have any questions, ask your nurse or doctor.               Start taking these medicines.        Dose/Directions    sildenafil 20 MG tablet   Commonly known as:  REVATIO   Used for:  Erectile dysfunction of organic origin   Started by:  Curt Schneider MD        \" take between 2-4 tablets at a time for planned sexual activity\" , maximum 5 pills per day  Never use with nitroglycerin, terazosin or doxazosin.   Quantity:  30 tablet   Refills:  11            Where to get your medicines      These [facility] [address] [place] [address] AT [place] [address], [facility] [address]     Phone:  [phone]     lisinopril 5 MG tablet    metFORMIN 500 MG tablet    pravastatin 80 MG tablet         Some of these will need a paper prescription and others can be bought over the counter.  Ask your nurse if you have questions.     Bring a paper prescription for each of these medications     sildenafil 20 MG tablet                Primary Care Provider Office Phone # Fax #    Curt Schneider [phone] [address] [facility] [address]        Equal Access to Services     [facility]: Hadyoseph Jimenez, wawilliam luqdanny, qaybta kaalnelly booth. So [facility] [phone].    ATENCIÓN: Si habla español, tiene a girard disposición servicios gratuitos de asistencia lingüística. Karlos holley [phone].    We comply with applicable federal civil rights laws and [place] laws. We do " not discriminate on the basis of race, color, national origin, age, disability, sex, sexual orientation, or gender identity.            Thank you!     Thank you for choosing [facility]  for your care. Our goal is always to provide you with excellent care. Hearing back from our patients is one way we can continue to improve our services. Please take a few minutes to complete the written survey that you may receive in the mail after your visit with us. Thank you!             Your Updated Medication List - Protect others around you: Learn how to safely use, store and throw away your medicines at www.disposemymeds.org.          This list is accurate as of 7/3/18  9:49 AM.  Always use your most recent med list.                   Brand Name Dispense Instructions for use Diagnosis    acetaminophen 500 MG tablet    TYLENOL     Take 500-1,000 mg by mouth every 6 hours as needed for mild pain        aspirin 81 MG tablet      Take 1 tablet by mouth daily.    Type 2 diabetes, HbA1c goal < 7% (H)       blood glucose monitoring test strip    ONETOUCH VERIO IQ    400 each    Use to test blood sugar 4 times daily or as directed.    Type 2 diabetes mellitus with diabetic polyneuropathy, with long-term current use of insulin (H)       cetirizine 10 MG tablet    zyrTEC     Take 10 mg by mouth daily as needed for allergies        GLUCAGON EMERGENCY 1 MG kit   Generic drug:  glucagon     1 mg    Inject 1 mg Subcutaneous once for 1 dose    Type 2 diabetes, controlled, with peripheral neuropathy (H)       insulin glargine 100 UNIT/ML injection    LANTUS SOLOSTAR    15 mL    Inject 10 units under the skin daily.    Type 2 diabetes mellitus with diabetic polyneuropathy, with long-term current use of insulin (H)       * insulin pen needle 31G X 6 MM     100 each    Use 4 daily or as directed.    Type 2 diabetes, controlled, with peripheral neuropathy (H)       * B-D U/F 31G X 5 MM   Generic drug:  insulin pen needle     100  "each    USE 4 DAILY OR AS DIRECTED    Type 2 diabetes, controlled, with peripheral neuropathy (H)       lisinopril 5 MG tablet    PRINIVIL/ZESTRIL    90 tablet    TAKE 1 TABLET(5 MG) BY MOUTH DAILY    Microalbuminuria       melatonin 1 MG Tabs tablet      Take 2 mg by mouth nightly as needed for sleep        metFORMIN 500 MG tablet    GLUCOPHAGE    360 tablet    TAKE 2 TABLETS BY MOUTH TWICE DAILY WITH MEALS    Type 2 diabetes mellitus with diabetic neuropathy, without long-term current use of insulin (H)       NovoLOG FLEXPEN 100 UNIT/ML injection   Generic drug:  insulin aspart     30 mL    [id number] BEFORE BREAKFAST, 4UNITS LUNCH, 4 UNITS BEFORE DINNER PLUS CORRECTION FACTOR OF 1/80/180 PER MEAL. MAX 25 UNTS/DAY    Type 2 diabetes mellitus with diabetic polyneuropathy, with long-term current use of insulin (H)       pravastatin 80 MG tablet    PRAVACHOL    90 tablet    Take 1 tablet (80 mg) by mouth daily    Hyperlipidemia LDL goal <100       sildenafil 20 MG tablet    REVATIO    30 tablet    \" take between 2-4 tablets at a time for planned sexual activity\" , maximum 5 pills per day  Never use with nitroglycerin, terazosin or doxazosin.    Erectile dysfunction of organic origin       tadalafil 20 MG tablet    CIALIS    6 tablet    Take 1 tablet (20 mg) by mouth daily as needed prior to sex. Do not use with nitroglycerin, terazosin or doxazosin.    Erectile dysfunction of organic origin       * Notice:  This list has 2 medication(s) that are the same as other medications prescribed for you. Read the directions carefully, and ask your doctor or other care provider to review them with you.      "

## 2018-08-12 DIAGNOSIS — E11.42 TYPE 2 DIABETES, CONTROLLED, WITH PERIPHERAL NEUROPATHY (H): ICD-10-CM

## 2018-08-13 RX ORDER — FLURBIPROFEN SODIUM 0.3 MG/ML
SOLUTION/ DROPS OPHTHALMIC
Qty: 100 EACH | Refills: 3 | Status: SHIPPED | OUTPATIENT
Start: 2018-08-13 | End: 2018-11-26

## 2018-10-01 DIAGNOSIS — E11.40 TYPE 2 DIABETES MELLITUS WITH DIABETIC NEUROPATHY, WITHOUT LONG-TERM CURRENT USE OF INSULIN (H): ICD-10-CM

## 2018-10-01 LAB — HBA1C MFR BLD: 6.3 % (ref 0–5.6)

## 2018-10-01 PROCEDURE — 83036 HEMOGLOBIN GLYCOSYLATED A1C: CPT | Performed by: INTERNAL MEDICINE

## 2018-10-01 PROCEDURE — 36415 COLL VENOUS BLD VENIPUNCTURE: CPT | Performed by: INTERNAL MEDICINE

## 2018-10-09 ENCOUNTER — OFFICE VISIT (OUTPATIENT)
Dept: PHARMACY | Facility: CLINIC | Age: 68
End: 2018-10-09
Payer: COMMERCIAL

## 2018-10-09 VITALS — DIASTOLIC BLOOD PRESSURE: 64 MMHG | WEIGHT: 203.5 LBS | BODY MASS INDEX: 24.93 KG/M2 | SYSTOLIC BLOOD PRESSURE: 118 MMHG

## 2018-10-09 DIAGNOSIS — E78.5 HYPERLIPIDEMIA LDL GOAL <100: ICD-10-CM

## 2018-10-09 DIAGNOSIS — G47.00 INSOMNIA, UNSPECIFIED TYPE: ICD-10-CM

## 2018-10-09 DIAGNOSIS — N52.9 ERECTILE DYSFUNCTION OF ORGANIC ORIGIN: ICD-10-CM

## 2018-10-09 DIAGNOSIS — J30.1 HAYFEVER: ICD-10-CM

## 2018-10-09 DIAGNOSIS — M17.0 OSTEOARTHRITIS OF BOTH KNEES, UNSPECIFIED OSTEOARTHRITIS TYPE: ICD-10-CM

## 2018-10-09 DIAGNOSIS — E11.42 TYPE 2 DIABETES MELLITUS WITH DIABETIC POLYNEUROPATHY, WITH LONG-TERM CURRENT USE OF INSULIN (H): ICD-10-CM

## 2018-10-09 DIAGNOSIS — Z79.4 TYPE 2 DIABETES MELLITUS WITH DIABETIC POLYNEUROPATHY, WITH LONG-TERM CURRENT USE OF INSULIN (H): ICD-10-CM

## 2018-10-09 DIAGNOSIS — R80.9 MICROALBUMINURIA: Primary | ICD-10-CM

## 2018-10-09 PROCEDURE — 99607 MTMS BY PHARM ADDL 15 MIN: CPT | Performed by: PHARMACIST

## 2018-10-09 PROCEDURE — 99605 MTMS BY PHARM NP 15 MIN: CPT | Performed by: PHARMACIST

## 2018-10-09 NOTE — MR AVS SNAPSHOT
After Visit Summary   10/9/2018    Felipe Campbell    MRN: [id number]           Patient Information     Date Of Birth          1950        Visit Information        Provider Department      10/9/2018 10:30 AM Emmanuelle Henry, [facility] MTM        Today's Diagnoses     Type 2 diabetes mellitus with diabetic polyneuropathy, with long-term current use of insulin (H)          Care Instructions    Recommendations from today's MTM visit:                                                    MTM (medication therapy management) is a service provided by a clinical pharmacist designed to help you get the most of out of your medicines.   Today we reviewed what your medicines are for, how to know if they are working, that your medicines are safe and how to make your medicine regimen as easy as possible.     1.  You'll receive notification from [facility] that our visits are not covered - please disregard this, you will not receive a bill.    2.  Continue current medication regimen.    Next MTM visit:  6 months, sooner if needed    To schedule another MTM appointment, please call the clinic directly or you may call the MTM scheduling line at [phone] or toll-free at [phone].     My Clinical Pharmacist's contact information:                                                      It was a pleasure seeing you today!  Please feel free to contact me with any questions or concerns you have.      Emmanuelle Henry, PharmD, [facility]  Medication Therapy Management Provider  Pager: [phone]     You may receive a survey about the MTM services you received.  I would appreciate your feedback to help me serve you better in the future. Please fill it out and return it when you can. Your comments will be anonymous.                     Follow-ups after your visit        Who to contact     If you have questions or need follow up information about today's clinic visit or your schedule please contact [facility] directly at [phone].  Normal or non-critical lab and imaging results will be communicated to you by MyChart, letter or phone within 4 business days after the clinic has received the results. If you do not hear from us within 7 days, please contact the clinic through [employer] or phone. If you have a critical or abnormal lab result, we will notify you by phone as soon as possible.  Submit refill requests through [employer] (MM) or call your pharmacy and they will forward the refill request to us. Please allow 3 business days for your refill to be completed.          Additional Information About Your Visit        [employer] Information     [employer] (MM) gives you secure access to your electronic health record. If you see a primary care provider, you can also send messages to your care team and make appointments. If you have questions, please call your primary care clinic.  If you do not have a primary care provider, please call [phone] and they will assist you.        Care EveryWhere ID     This is your Care EveryWhere ID. This could be used by other organizations to access your [place] medical records  [id number]        Your Vitals Were     BMI (Body Mass Index)                   24.93 kg/m2            Blood Pressure from Last 3 Encounters:   10/09/18 118/64   07/03/18 106/64   03/22/18 116/66    Weight from Last 3 Encounters:   10/09/18 203 lb 8 oz (92.3 kg)   07/03/18 207 lb (93.9 kg)   12/21/17 200 lb 6.4 oz (90.9 kg)              Today, you had the following     No orders found for display         Today's Medication Changes          These changes are accurate as of 10/9/18 10:52 AM.  If you have any questions, ask your nurse or doctor.               These medicines have changed or have updated prescriptions.        Dose/Directions    insulin glargine 100 UNIT/ML injection   Commonly known as:  LANTUS SOLOSTAR   This may have changed:  additional instructions   Used for:  Type 2 diabetes mellitus with  diabetic polyneuropathy, with long-term current use of insulin (H)        Inject 6 units under the skin daily.   Quantity:  15 mL   Refills:  1            Where to get your medicines      Some of these will need a paper prescription and others can be bought over the counter.  Ask your nurse if you have questions.     You don't need a prescription for these medications     insulin glargine 100 UNIT/ML injection                Primary Care Provider Office Phone # Fax #    Curt Schneider [phone] [address] [facility] [address]        Equal Access to Services     [facility]: Hadii aad ku hadasho Soomaali, waaxda luqadaha, qaybta kaalmada adeegyafatou, nelly arellano . So [facility] [phone].    ATENCIÓN: Si habla español, tiene a girard disposición servicios gratuitos de asistencia lingüística. [facility] [phone].    We comply with applicable federal civil rights laws and [place] laws. We do not discriminate on the basis of race, color, national origin, age, disability, sex, sexual orientation, or gender identity.            Thank you!     Thank you for choosing [facility]  for your care. Our goal is always to provide you with excellent care. Hearing back from our patients is one way we can continue to improve our services. Please take a few minutes to complete the written survey that you may receive in the mail after your visit with us. Thank you!             Your Updated Medication List - Protect others around you: Learn how to safely use, store and throw away your medicines at www.disposemymeds.org.          This list is accurate as of 10/9/18 10:52 AM.  Always use your most recent med list.                   Brand Name Dispense Instructions for use Diagnosis    acetaminophen 500 MG tablet    TYLENOL     Take 500-1,000 mg by mouth every 6 hours as needed for mild pain        aspirin 81 MG tablet      Take 1 tablet by mouth daily.    Type 2  diabetes, HbA1c goal < 7% (H)       blood glucose monitoring test strip    ONETOUCH VERIO IQ    400 each    Use to test blood sugar 4 times daily or as directed.    Type 2 diabetes mellitus with diabetic polyneuropathy, with long-term current use of insulin (H)       cetirizine 10 MG tablet    zyrTEC     Take 10 mg by mouth daily as needed for allergies        GLUCAGON EMERGENCY 1 MG kit   Generic drug:  glucagon     1 mg    Inject 1 mg Subcutaneous once for 1 dose    Type 2 diabetes, controlled, with peripheral neuropathy (H)       insulin glargine 100 UNIT/ML injection    LANTUS SOLOSTAR    15 mL    Inject 6 units under the skin daily.    Type 2 diabetes mellitus with diabetic polyneuropathy, with long-term current use of insulin (H)       * insulin pen needle 31G X 6 MM     100 each    Use 4 daily or as directed.    Type 2 diabetes, controlled, with peripheral neuropathy (H)       * B-D U/F 31G X 5 MM   Generic drug:  insulin pen needle     100 each    USE 4 DAILY OR AS DIRECTED    Type 2 diabetes, controlled, with peripheral neuropathy (H)       lisinopril 5 MG tablet    PRINIVIL/ZESTRIL    90 tablet    TAKE 1 TABLET(5 MG) BY MOUTH DAILY    Microalbuminuria       melatonin 1 MG Tabs tablet      Take 2 mg by mouth nightly as needed for sleep        metFORMIN 500 MG tablet    GLUCOPHAGE    360 tablet    TAKE 2 TABLETS BY MOUTH TWICE DAILY WITH MEALS    Type 2 diabetes mellitus with diabetic neuropathy, without long-term current use of insulin (H)       NovoLOG FLEXPEN 100 UNIT/ML injection   Generic drug:  insulin aspart     30 mL    [id number] BEFORE BREAKFAST, 4UNITS LUNCH, 4 UNITS BEFORE DINNER PLUS CORRECTION FACTOR OF 1/80/180 PER MEAL. MAX 25 UNTS/DAY    Type 2 diabetes mellitus with diabetic polyneuropathy, with long-term current use of insulin (H)       pravastatin 80 MG tablet    PRAVACHOL    90 tablet    Take 1 tablet (80 mg) by mouth daily    Hyperlipidemia LDL goal <100       sildenafil 20 MG tablet  "   REVATIO    30 tablet    \" take between 2-4 tablets at a time for planned sexual activity\" , maximum 5 pills per day  Never use with nitroglycerin, terazosin or doxazosin.    Erectile dysfunction of organic origin       * Notice:  This list has 2 medication(s) that are the same as other medications prescribed for you. Read the directions carefully, and ask your doctor or other care provider to review them with you.      "

## 2018-10-09 NOTE — PROGRESS NOTES
SUBJECTIVE/OBJECTIVE:                Felipe Campbell is a 68 year old male coming in for a follow-up visit for Medication Therapy Management.  He was referred to me from Dr. Schneider.     Chief Complaint: Follow up from our visit on 3/22/18.  He's here to f/up, he has no specific questions or concerns today.  This visit serves as an initial visit under his [facility] insurance plan.    Tobacco: History of tobacco dependence - quit 1979  Alcohol: Less than 1 beverages / week    Medication Adherence/Access: no issues reported    Diabetes:  Pt currently taking metformin 1000mg BID, Lantus 6 units daily (dose reduced by PCP about 3 months ago) and Novolog 4-5 units TID with meals (generally 1 unit per carb choice plus one additional unit)  Pt is not experiencing side effects.    SMBG: TID.   Ranges (per patient's log book): 14 day avg 115mg/dL; 30 day avg 111mg/dL (96% of readings in range)  Symptoms of low blood sugar? none. Frequency of hypoglycemia? Never.  He does have glucagon available if needed, and his wife is familiar with administration.  He was having some overnight hypoglycemia episodes when he was taking 10 units of Lantus, none since dose was reduced.  Recent symptoms of high blood sugar? none  Eye exam: up to date  Foot exam: up to date  Microalbumin is not < 30 mg/g. Pt is taking an ACEi/ARB.  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  Lab Results   Component Value Date    A1C 6.3 10/01/2018    A1C 6.1 06/27/2018    A1C 6.2 12/12/2017    A1C 6.8 06/13/2017    A1C 8.1 12/06/2016       Microalbuminuria:  Pt is taking lisinopril 5mg daily.  He denies side effects of therapy.    Hyperlipidemia: Current therapy includes pravastatin 80mg once daily.  Pt reports no significant myalgias or other side effects.   The 10-year ASCVD risk score ([place] PARVEEN Jr, et  al., 2013) is: 14.9%    Values used to calculate the score:      Age: 68 years      Sex: Male      Is Non- : No      Diabetic: Yes      Tobacco smoker: No      Systolic Blood Pressure: 106 mmHg      Is BP treated: Yes      HDL Cholesterol: 69 mg/dL      Total Cholesterol: 150 mg/dL     LDL Cholesterol Calculated   Date Value Ref Range Status   12/12/2017 63 <100 mg/dL Final     Comment:     Desirable:       <100 mg/dl      Insomnia: He continues using melatonin as needed, which he finds effective.  He generally takes 2mg when needed and denies side effects.    ED:  He's now taking sildenafil 40-80mg as needed and reports this is effective.  He denies side effects.    Arthritis:  He's been taking APAP 1000mg PRN knee/shoulder pain, and has found this effective.  He denies side effects.  He's aware of maximum APAP dosing/day.    Allergies:  He continues taking Zyrtec 10mg daily PRN, which he finds effective.  He denies side effects.    Today's Vitals: /64  Wt 203 lb 8 oz (92.3 kg)  BMI 24.93 kg/m2    ASSESSMENT:              Current medications were reviewed today as discussed above.      Medication Adherence: good, no issues identified    Diabetes: Stable.  Patient is meeting A1c goal of < 7%. Self monitoring of blood glucose is at goal of fasting  mg/dL and post prandial < 180 mg/dL.  Of note, he's currently taking quite a bit more short acting than long acting insulin throughout the course of the day.  Since he's monitoring his BG so closely and BG are almost completely at goal, no changes made today.  If BG increase in the future, I'd recommend increasing his basal insulin before adjusting mealtime insulin.  He may be a good candidate for CGM since he likes keeping close tabs on his BG and plays bass so only pricks on 4 fingers, but he's not interested in this at this time.    Microalbuminuria: Stable.    Hyperlipidemia: Stable. Pt is on moderate intensity statin which is  indicated based on 2013 ACC/AHA guidelines for lipid management.  Further increase in statin intensity is not necessary as LDL is already quite low.     Insomnia: Stable.    ED: Stable.    Arthritis:  Stable.    Allergies:  Stable.     PLAN:                  1.  Continue current medication regimen.    I spent 30 minutes with this patient today. A copy of the visit note was provided to the patient's primary care provider.     Will follow up with PCP in 3 months, with MTM in 6 months or sooner if needed.    The patient was given a summary of these recommendations as an after visit summary.    Emmanuelle Henry, PharmD, [facility]  Medication Therapy Management Provider  Pager: [phone]

## 2018-10-09 NOTE — PATIENT INSTRUCTIONS
Recommendations from today's MTM visit:                                                    MTM (medication therapy management) is a service provided by a clinical pharmacist designed to help you get the most of out of your medicines.   Today we reviewed what your medicines are for, how to know if they are working, that your medicines are safe and how to make your medicine regimen as easy as possible.     1.  You'll receive notification from [facility] that our visits are not covered - please disregard this, you will not receive a bill.    2.  Continue current medication regimen.    Next MTM visit:  6 months, sooner if needed    To schedule another MTM appointment, please call the clinic directly or you may call the MTM scheduling line at [phone] or toll-free at [phone].     My Clinical Pharmacist's contact information:                                                      It was a pleasure seeing you today!  Please feel free to contact me with any questions or concerns you have.      Emmanuelle Henry PharmD, [facility]  Medication Therapy Management Provider  Pager: [phone]     You may receive a survey about the MTM services you received.  I would appreciate your feedback to help me serve you better in the future. Please fill it out and return it when you can. Your comments will be anonymous.

## 2018-10-24 ENCOUNTER — OFFICE VISIT (OUTPATIENT)
Dept: OPHTHALMOLOGY | Facility: CLINIC | Age: 68
End: 2018-10-24
Payer: COMMERCIAL

## 2018-10-24 DIAGNOSIS — E11.42 TYPE 2 DIABETES MELLITUS WITH DIABETIC POLYNEUROPATHY, WITH LONG-TERM CURRENT USE OF INSULIN (H): ICD-10-CM

## 2018-10-24 DIAGNOSIS — H25.813 COMBINED FORMS OF AGE-RELATED CATARACT OF BOTH EYES: ICD-10-CM

## 2018-10-24 DIAGNOSIS — Z79.4 TYPE 2 DIABETES MELLITUS WITH DIABETIC POLYNEUROPATHY, WITH LONG-TERM CURRENT USE OF INSULIN (H): ICD-10-CM

## 2018-10-24 DIAGNOSIS — H43.813 POSTERIOR VITREOUS DETACHMENT OF BOTH EYES: Primary | ICD-10-CM

## 2018-10-24 PROBLEM — H43.811 PVD (POSTERIOR VITREOUS DETACHMENT), RIGHT EYE: Status: ACTIVE | Noted: 2018-10-24

## 2018-10-24 PROCEDURE — 92004 COMPRE OPH EXAM NEW PT 1/>: CPT | Performed by: OPHTHALMOLOGY

## 2018-10-24 ASSESSMENT — SLIT LAMP EXAM - LIDS
COMMENTS: NORMAL
COMMENTS: NORMAL

## 2018-10-24 ASSESSMENT — VISUAL ACUITY
METHOD: SNELLEN - LINEAR
OD_PH_CC: 20/25-2
OS_CC+: -2
CORRECTION_TYPE: GLASSES
OS_CC: 20/25
OD_CC: 20/40

## 2018-10-24 ASSESSMENT — TONOMETRY
OS_IOP_MMHG: 16
OD_IOP_MMHG: 13
IOP_METHOD: APPLANATION

## 2018-10-24 ASSESSMENT — CUP TO DISC RATIO
OS_RATIO: 0.3
OD_RATIO: 0.3

## 2018-10-24 ASSESSMENT — EXTERNAL EXAM - LEFT EYE: OS_EXAM: 2+ BROW PTOSIS, MILD TO MOD BROW

## 2018-10-24 ASSESSMENT — EXTERNAL EXAM - RIGHT EYE: OD_EXAM: 2+ BROW PTOSIS, MILD TO MOD BROW

## 2018-10-24 NOTE — MR AVS SNAPSHOT
After Visit Summary   10/24/2018    Felipe Campbell    MRN: [id number]           Patient Information     Date Of Birth          1950        Visit Information        Provider Department      10/24/2018 9:15 AM Sonny Monet MD [facility]        Today's Diagnoses     PVD (posterior vitreous detachment), bilateral    -  1    Type 2 diabetes mellitus with diabetic polyneuropathy, with long-term current use of insulin (H)          Care Instructions    Signs and symptoms of retinal detachment (shower of black floaters, frequent flashing even during day, curtain over part of visual field) discussed.  Recommend yearly diabetic eye exams.  Call in June 2019 for an appointment in October 2019 for Complete Exam.    Dr. Monet [phone]            Follow-ups after your visit        Who to contact     If you have questions or need follow up information about today's clinic visit or your schedule please contact [facility] directly at [phone].  Normal or non-critical lab and imaging results will be communicated to you by Mobile Active Defensehart, letter or phone within 4 business days after the clinic has received the results. If you do not hear from us within 7 days, please contact the clinic through Yumitt or phone. If you have a critical or abnormal lab result, we will notify you by phone as soon as possible.  Submit refill requests through [email or web address] or call your pharmacy and they will forward the refill request to us. Please allow 3 business days for your refill to be completed.          Additional Information About Your Visit        MyChart Information     [email or web address] gives you secure access to your electronic health record. If you see a primary care provider, you can also send messages to your care team and make appointments. If you have questions, please call your primary care clinic.  If you do not have a primary care provider, please call [phone] and they will assist you.         Care EveryWhere ID     This is your Care EveryWhere ID. This could be used by other organizations to access your [place] medical records  [id number]         Blood Pressure from Last 3 Encounters:   10/09/18 118/64   07/03/18 106/64   03/22/18 116/66    Weight from Last 3 Encounters:   10/09/18 92.3 kg (203 lb 8 oz)   07/03/18 93.9 kg (207 lb)   12/21/17 90.9 kg (200 lb 6.4 oz)              Today, you had the following     No orders found for display       Primary Care Provider Office Phone # Fax #    Curt Schneider [phone] [address] [facility] [address]        Equal Access to Services     RODERICK NAIR : Hadii che ku hadasho Soomaali, waaxda luqadaha, qaybta kaalmada adeegyada, waxesme tianin zen arellano . So [facility] [phone].    ATENCIÓN: Si habla español, tiene a girard disposición servicios gratuitos de asistencia lingüística. [facility] [phone].    We comply with applicable federal civil rights laws and [place] laws. We do not discriminate on the basis of race, color, national origin, age, disability, sex, sexual orientation, or gender identity.            Thank you!     Thank you for choosing [facility]  for your care. Our goal is always to provide you with excellent care. Hearing back from our patients is one way we can continue to improve our services. Please take a few minutes to complete the written survey that you may receive in the mail after your visit with us. Thank you!             Your Updated Medication List - Protect others around you: Learn how to safely use, store and throw away your medicines at www.disposemymeds.org.          This list is accurate as of 10/24/18 10:25 AM.  Always use your most recent med list.                   Brand Name Dispense Instructions for use Diagnosis    acetaminophen 500 MG tablet    TYLENOL     Take 500-1,000 mg by mouth every 6 hours as needed for mild pain        aspirin 81 MG tablet      Take 1  tablet by mouth daily.    Type 2 diabetes, HbA1c goal < 7% (H)       blood glucose monitoring test strip    ONETOUCH VERIO IQ    400 each    Use to test blood sugar 4 times daily or as directed.    Type 2 diabetes mellitus with diabetic polyneuropathy, with long-term current use of insulin (H)       cetirizine 10 MG tablet    zyrTEC     Take 10 mg by mouth daily as needed for allergies        GLUCAGON EMERGENCY 1 MG kit   Generic drug:  glucagon     1 mg    Inject 1 mg Subcutaneous once for 1 dose    Type 2 diabetes, controlled, with peripheral neuropathy (H)       insulin glargine 100 UNIT/ML injection    LANTUS SOLOSTAR    15 mL    Inject 6 units under the skin daily.    Type 2 diabetes mellitus with diabetic polyneuropathy, with long-term current use of insulin (H)       * insulin pen needle 31G X 6 MM     100 each    Use 4 daily or as directed.    Type 2 diabetes, controlled, with peripheral neuropathy (H)       * B-D U/F 31G X 5 MM   Generic drug:  insulin pen needle     100 each    USE 4 DAILY OR AS DIRECTED    Type 2 diabetes, controlled, with peripheral neuropathy (H)       lisinopril 5 MG tablet    PRINIVIL/ZESTRIL    90 tablet    TAKE 1 TABLET(5 MG) BY MOUTH DAILY    Microalbuminuria       melatonin 1 MG Tabs tablet      Take 2 mg by mouth nightly as needed for sleep        metFORMIN 500 MG tablet    GLUCOPHAGE    360 tablet    TAKE 2 TABLETS BY MOUTH TWICE DAILY WITH MEALS    Type 2 diabetes mellitus with diabetic neuropathy, without long-term current use of insulin (H)       NovoLOG FLEXPEN 100 UNIT/ML injection   Generic drug:  insulin aspart     30 mL    [id number] BEFORE BREAKFAST, 4UNITS LUNCH, 4 UNITS BEFORE DINNER PLUS CORRECTION FACTOR OF 1/80/180 PER MEAL. MAX 25 UNTS/DAY    Type 2 diabetes mellitus with diabetic polyneuropathy, with long-term current use of insulin (H)       pravastatin 80 MG tablet    PRAVACHOL    90 tablet    Take 1 tablet (80 mg) by mouth daily    Hyperlipidemia LDL goal  "<100       sildenafil 20 MG tablet    REVATIO    30 tablet    \" take between 2-4 tablets at a time for planned sexual activity\" , maximum 5 pills per day  Never use with nitroglycerin, terazosin or doxazosin.    Erectile dysfunction of organic origin       * Notice:  This list has 2 medication(s) that are the same as other medications prescribed for you. Read the directions carefully, and ask your doctor or other care provider to review them with you.      "

## 2018-10-24 NOTE — LETTER
10/24/2018         RE: Felipe Campbell  [address]  [place] [address]        Dear Colleague,    Thank you for referring your patient, Felipe Campbell, to the [facility]. Please see a copy of my visit note below.     Current Eye Medications:  None     Subjective:  Light flashes right eye more in peripheral temporal, started last night around 5:30pm. Happens more often when moving eyes. Having floaters both eyes for years, hasn't noticed change. Vision is OK both eyes. No eye pain or discomfort in either eye. Patient uses RGP multi-focal contacts both eyes. Diagnosed with type 2 diabetes 10 years ago. Blood sugar has been doing well.    Has been following at [facility].    Lab Results   Component Value Date    A1C 6.3 10/01/2018    A1C 6.1 06/27/2018    A1C 6.2 12/12/2017    A1C 6.8 06/13/2017    A1C 8.1 12/06/2016        Objective:  See Ophthalmology Exam.       Assessment:  Possible acute posterior vitreous detachment right eye with photopsia; no retinal tear or detachment.      ICD-10-CM    1. Posterior vitreous detachment of both eyes H43.813    2. Type 2 diabetes mellitus with diabetic polyneuropathy, with long-term current use of insulin (H) E11.42     Z79.4    3. Combined forms of age-related cataract, mild, of both eyes H25.813         Plan:  Signs and symptoms of retinal detachment (shower of black floaters, frequent flashing even during day, curtain over part of visual field) discussed.  Recommend yearly diabetic eye exams.  Call in June 2019 for an appointment in October 2019 for Complete Exam.    Dr. Monet [phone]             Again, thank you for allowing me to participate in the care of your patient.        Sincerely,        Sonny Monet MD

## 2018-10-24 NOTE — PROGRESS NOTES
Current Eye Medications:  None     Subjective:  Light flashes right eye more in peripheral temporal, started last night around 5:30pm. Happens more often when moving eyes. Having floaters both eyes for years, hasn't noticed change. Vision is OK both eyes. No eye pain or discomfort in either eye. Patient uses RGP multi-focal contacts both eyes. Diagnosed with type 2 diabetes 10 years ago. Blood sugar has been doing well.    Has been following at [facility].    Lab Results   Component Value Date    A1C 6.3 10/01/2018    A1C 6.1 06/27/2018    A1C 6.2 12/12/2017    A1C 6.8 06/13/2017    A1C 8.1 12/06/2016        Objective:  See Ophthalmology Exam.       Assessment:  Possible acute posterior vitreous detachment right eye with photopsia; no retinal tear or detachment.      ICD-10-CM    1. Posterior vitreous detachment of both eyes H43.813    2. Type 2 diabetes mellitus with diabetic polyneuropathy, with long-term current use of insulin (H) E11.42     Z79.4    3. Combined forms of age-related cataract, mild, of both eyes H25.813         Plan:  Signs and symptoms of retinal detachment (shower of black floaters, frequent flashing even during day, curtain over part of visual field) discussed.  Recommend yearly diabetic eye exams.  Call in June 2019 for an appointment in October 2019 for Complete Exam.    Dr. Monet [phone]

## 2018-10-24 NOTE — PATIENT INSTRUCTIONS
Signs and symptoms of retinal detachment (shower of black floaters, frequent flashing even during day, curtain over part of visual field) discussed.  Recommend yearly diabetic eye exams.  Call in June 2019 for an appointment in October 2019 for Complete Exam.    Dr. Monet [phone]

## 2018-11-26 DIAGNOSIS — E11.42 TYPE 2 DIABETES, CONTROLLED, WITH PERIPHERAL NEUROPATHY (H): ICD-10-CM

## 2018-11-27 RX ORDER — FLURBIPROFEN SODIUM 0.3 MG/ML
SOLUTION/ DROPS OPHTHALMIC
Qty: 100 EACH | Refills: 1 | Status: SHIPPED | OUTPATIENT
Start: 2018-11-27 | End: 2019-01-18

## 2018-12-02 DIAGNOSIS — E11.42 TYPE 2 DIABETES, CONTROLLED, WITH PERIPHERAL NEUROPATHY (H): ICD-10-CM

## 2018-12-02 DIAGNOSIS — R80.9 MICROALBUMINURIA: ICD-10-CM

## 2018-12-03 RX ORDER — LISINOPRIL 5 MG/1
TABLET ORAL
Qty: 90 TABLET | Refills: 1 | Status: SHIPPED | OUTPATIENT
Start: 2018-12-03 | End: 2019-07-19

## 2019-01-01 DIAGNOSIS — E78.5 HYPERLIPIDEMIA LDL GOAL <100: ICD-10-CM

## 2019-01-02 RX ORDER — PRAVASTATIN SODIUM 80 MG/1
TABLET ORAL
Qty: 90 TABLET | Refills: 0 | Status: SHIPPED | OUTPATIENT
Start: 2019-01-02 | End: 2019-04-04

## 2019-01-02 NOTE — TELEPHONE ENCOUNTER
"Routing refill request to provider for review/approval because:  Labs not current:  LDL    Requested Prescriptions   Pending Prescriptions Disp Refills     pravastatin (PRAVACHOL) 80 MG tablet [Pharmacy Med Name: PRAVASTATIN 80MG TABLETS] 90 tablet 0     Sig: TAKE 1 TABLET(80 MG) BY MOUTH DAILY    Statins Protocol Failed - 1/2/2019  8:42 AM       Failed - LDL on file in past 12 months    Recent Labs   Lab Test 12/12/17  0709   LDL 63            Passed - No abnormal creatine kinase in past 12 months    No lab results found.            Passed - Recent (12 mo) or future (30 days) visit within the authorizing provider's specialty    Patient had office visit in the last 12 months or has a visit in the next 30 days with authorizing provider or within the authorizing provider's specialty.  See \"Patient Info\" tab in inbasket, or \"Choose Columns\" in Meds & Orders section of the refill encounter.             Passed - Patient is age 18 or older        Betzy Clark RN  "

## 2019-01-17 DIAGNOSIS — E78.5 HYPERLIPIDEMIA LDL GOAL <100: ICD-10-CM

## 2019-01-17 DIAGNOSIS — E11.42 TYPE 2 DIABETES MELLITUS WITH DIABETIC POLYNEUROPATHY, WITH LONG-TERM CURRENT USE OF INSULIN (H): ICD-10-CM

## 2019-01-17 DIAGNOSIS — Z79.4 TYPE 2 DIABETES MELLITUS WITH DIABETIC POLYNEUROPATHY, WITH LONG-TERM CURRENT USE OF INSULIN (H): ICD-10-CM

## 2019-01-17 DIAGNOSIS — R80.9 MICROALBUMINURIA: ICD-10-CM

## 2019-01-17 LAB
ANION GAP SERPL CALCULATED.3IONS-SCNC: 4 MMOL/L (ref 3–14)
BUN SERPL-MCNC: 22 MG/DL (ref 7–30)
CALCIUM SERPL-MCNC: 9.3 MG/DL (ref 8.5–10.1)
CHLORIDE SERPL-SCNC: 106 MMOL/L (ref 94–109)
CHOLEST SERPL-MCNC: 168 MG/DL
CO2 SERPL-SCNC: 31 MMOL/L (ref 20–32)
CREAT SERPL-MCNC: 1.1 MG/DL (ref 0.66–1.25)
GFR SERPL CREATININE-BSD FRML MDRD: 69 ML/MIN/{1.73_M2}
GLUCOSE SERPL-MCNC: 99 MG/DL (ref 70–99)
HBA1C MFR BLD: 6.3 % (ref 0–5.6)
HDLC SERPL-MCNC: 63 MG/DL
LDLC SERPL CALC-MCNC: 82 MG/DL
NONHDLC SERPL-MCNC: 105 MG/DL
POTASSIUM SERPL-SCNC: 4.3 MMOL/L (ref 3.4–5.3)
SODIUM SERPL-SCNC: 141 MMOL/L (ref 133–144)
TRIGL SERPL-MCNC: 113 MG/DL

## 2019-01-17 PROCEDURE — 80048 BASIC METABOLIC PNL TOTAL CA: CPT | Performed by: INTERNAL MEDICINE

## 2019-01-17 PROCEDURE — 80061 LIPID PANEL: CPT | Performed by: INTERNAL MEDICINE

## 2019-01-17 PROCEDURE — 83036 HEMOGLOBIN GLYCOSYLATED A1C: CPT | Performed by: INTERNAL MEDICINE

## 2019-01-17 PROCEDURE — 36415 COLL VENOUS BLD VENIPUNCTURE: CPT | Performed by: INTERNAL MEDICINE

## 2019-01-18 DIAGNOSIS — E11.42 TYPE 2 DIABETES, CONTROLLED, WITH PERIPHERAL NEUROPATHY (H): ICD-10-CM

## 2019-01-18 RX ORDER — FLURBIPROFEN SODIUM 0.3 MG/ML
SOLUTION/ DROPS OPHTHALMIC
Qty: 100 EACH | Refills: 1 | Status: SHIPPED | OUTPATIENT
Start: 2019-01-18 | End: 2019-03-11

## 2019-03-11 DIAGNOSIS — E11.42 TYPE 2 DIABETES, CONTROLLED, WITH PERIPHERAL NEUROPATHY (H): ICD-10-CM

## 2019-03-11 RX ORDER — FLURBIPROFEN SODIUM 0.3 MG/ML
SOLUTION/ DROPS OPHTHALMIC
Qty: 100 EACH | Refills: 2 | Status: SHIPPED | OUTPATIENT
Start: 2019-03-11 | End: 2019-06-04

## 2019-03-11 NOTE — TELEPHONE ENCOUNTER
Curt Schneider MD      1/18/19 1:32 PM   I think it would be ok to push your appointment out to July. We HAVE to see you then as it would be a year then, we can take care of whatever refills are needed     Curt Schneider MD        Last read by Felipe Campbell at 2:39 PM on 1/18/2019.

## 2019-04-04 DIAGNOSIS — E78.5 HYPERLIPIDEMIA LDL GOAL <100: ICD-10-CM

## 2019-04-04 DIAGNOSIS — E11.42 TYPE 2 DIABETES MELLITUS WITH DIABETIC POLYNEUROPATHY, WITH LONG-TERM CURRENT USE OF INSULIN (H): ICD-10-CM

## 2019-04-04 DIAGNOSIS — Z79.4 TYPE 2 DIABETES MELLITUS WITH DIABETIC POLYNEUROPATHY, WITH LONG-TERM CURRENT USE OF INSULIN (H): ICD-10-CM

## 2019-04-05 RX ORDER — PRAVASTATIN SODIUM 80 MG/1
TABLET ORAL
Qty: 90 TABLET | Refills: 0 | Status: SHIPPED | OUTPATIENT
Start: 2019-04-05 | End: 2019-07-06

## 2019-05-13 ENCOUNTER — OFFICE VISIT (OUTPATIENT)
Dept: PHARMACY | Facility: CLINIC | Age: 69
End: 2019-05-13
Payer: COMMERCIAL

## 2019-05-13 VITALS — SYSTOLIC BLOOD PRESSURE: 118 MMHG | DIASTOLIC BLOOD PRESSURE: 74 MMHG

## 2019-05-13 DIAGNOSIS — E11.42 TYPE 2 DIABETES MELLITUS WITH DIABETIC POLYNEUROPATHY, WITH LONG-TERM CURRENT USE OF INSULIN (H): Primary | ICD-10-CM

## 2019-05-13 DIAGNOSIS — Z79.4 TYPE 2 DIABETES MELLITUS WITH DIABETIC POLYNEUROPATHY, WITH LONG-TERM CURRENT USE OF INSULIN (H): Primary | ICD-10-CM

## 2019-05-13 DIAGNOSIS — E78.5 HYPERLIPIDEMIA LDL GOAL <100: ICD-10-CM

## 2019-05-13 DIAGNOSIS — N52.9 ERECTILE DYSFUNCTION OF ORGANIC ORIGIN: ICD-10-CM

## 2019-05-13 DIAGNOSIS — G47.00 INSOMNIA, UNSPECIFIED TYPE: ICD-10-CM

## 2019-05-13 DIAGNOSIS — J30.1 HAYFEVER: ICD-10-CM

## 2019-05-13 DIAGNOSIS — M17.0 OSTEOARTHRITIS OF BOTH KNEES, UNSPECIFIED OSTEOARTHRITIS TYPE: ICD-10-CM

## 2019-05-13 DIAGNOSIS — R80.9 MICROALBUMINURIA: ICD-10-CM

## 2019-05-13 PROCEDURE — 99607 MTMS BY PHARM ADDL 15 MIN: CPT | Performed by: PHARMACIST

## 2019-05-13 PROCEDURE — 99605 MTMS BY PHARM NP 15 MIN: CPT | Performed by: PHARMACIST

## 2019-05-13 NOTE — PATIENT INSTRUCTIONS
Recommendations from today's MTM visit:                                                      1. Continue your current medications.    Next MTM visit: 6 months or sooner.    To schedule another MTM appointment, please call the clinic directly or you may call the MTM scheduling line at [phone] or toll-free at [phone].     My Clinical Pharmacist's contact information:                                                      It was a pleasure talking with you today!  Please feel free to contact me with any questions or concerns you have.      Anna Su, PharmD  Medication Therapy Management Pharmacist  [phone]    You may receive a survey about the MTM services you received by email and/or US Mail.  I would appreciate your feedback to help me serve you better in the future. Your comments will be anonymous.

## 2019-05-13 NOTE — PROGRESS NOTES
SUBJECTIVE/OBJECTIVE:                Felipe Campbell is a 68 year old male coming in for a follow-up visit for Medication Therapy Management.  He was referred to me from Curt Schneider.     Chief Complaint: Follow up from our visit on 10/9/18. This will serve as an initial visit for 2019, pt is Outcomes eligible. No concerns, f/u from six months ago.  If pt meets with MTM 2x/year, gets testing supplies for free.    Diabetes is always a focus, his father passed away from diabetes (renal failure).    Tobacco: History of tobacco dependence - quit 1979  Alcohol: Less than 1 beverages / week    Medication Adherence/Access: no issues reported    Diabetes:  Pt currently taking metformin 1000mg BID, Lantus 6 units daily (dose reduced by PCP about 3 months ago) and Novolog 4-5 units TID with meals (generally 1 unit per carb choice plus one additional unit). Pt has been on this regimen for two years.  Pt is not experiencing side effects.    SMBG: TID, generally before meals.   Ranges (per patient's log book): avg 135 mg/dL (20 readings)  Symptoms of low blood sugar? none. Frequency of hypoglycemia? None, latest lowest is 79.  He does have glucagon available if needed, and his wife is familiar with administration.  He was having some overnight hypoglycemia episodes when he was taking 10 units of Lantus, none since dose was reduced.  Recent symptoms of high blood sugar? none  Eye exam: up to date  Foot exam: up to date  Microalbumin is not < 30 mg/g. Pt is taking an ACEi/ARB.  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  Lab Results   Component Value Date    A1C 6.3 01/17/2019    A1C 6.3 10/01/2018    A1C 6.1 06/27/2018    A1C 6.2 12/12/2017    A1C 6.8 06/13/2017     Microalbuminuria:  Pt is taking lisinopril 5mg daily.  He denies side effects of therapy.  Urine  Albumin:   Lab Results   Component Value Date    UMALCR 17.20 (H) 06/27/2018     BP Readings from Last 3 Encounters:   10/09/18 118/64   07/03/18 106/64   03/22/18 116/66     Hyperlipidemia: Current therapy includes pravastatin 80mg once daily.  Pt reports no significant myalgias or other side effects.   The 10-year ASCVD risk score (Aniya SAINI Jr., et al., 2013) is: 17.4%    Values used to calculate the score:      Age: 68 years      Sex: Male      Is Non- : No      Diabetic: Yes      Tobacco smoker: No      Systolic Blood Pressure: 118 mmHg      Is BP treated: No      HDL Cholesterol: 63 mg/dL      Total Cholesterol: 168 mg/dL    Recent Labs   Lab Test 01/17/19  0810 12/12/17  0709  06/15/15  0741  03/14/14  0726   CHOL 168 150   < > 156  --  213*   HDL 63 69   < > 58  --  44   LDL 82 63   < > 75   < > 119   TRIG 113 91   < > 115  --  248*   CHOLHDLRATIO  --   --   --  2.7  --  4.9    < > = values in this interval not displayed.     Insomnia: Has trouble falling asleep. He continues using melatonin as needed, which he finds effective.  He generally takes 2mg when needed and denies side effects.    ED:  He's now taking sildenafil 40-80mg as needed and reports this is effective.  He denies side effects.    Arthritis:  He's been taking APAP 1000mg PRN and biofreeze PRN for knee/shoulder pain (chet shoulders, bass player), and has found this effective.  He denies side effects.  He's aware of maximum APAP dosing/day.    Allergies:  He continues taking Zyrtec 10mg daily PRN seasonally, which he finds effective.  He denies side effects.    Today's Vitals: /74       ASSESSMENT:              Current medications were reviewed today as discussed above.      Medication Adherence: excellent, no issues identified    Diabetes:  Stable.    Microalbuminuria:  Stable.    Hyperlipidemia: Stable. Pt is on moderate intensity statin which is indicated based on 2013 ACC/AHA guidelines for lipid management.  His  LDL did increase from previous labs, however remains < 100.  Ok to continue current statin.     Insomnia: Stable.    ED:  Stable.    Arthritis: Stable.    Allergies:  Stable.     PLAN:                  1. Continue current medications and follow up with Curt Schneider in July, as planned.    I spent 60 minutes with this patient today. A copy of the visit note was provided to the patient's primary care provider.     Will follow up in 6 months.    The patient was given a summary of these recommendations as an after visit summary.    Anna Su, PharmD  Medication Therapy Management Pharmacist  [phone]

## 2019-05-17 DIAGNOSIS — E11.42 TYPE 2 DIABETES MELLITUS WITH DIABETIC POLYNEUROPATHY, WITH LONG-TERM CURRENT USE OF INSULIN (H): ICD-10-CM

## 2019-05-17 DIAGNOSIS — Z79.4 TYPE 2 DIABETES MELLITUS WITH DIABETIC POLYNEUROPATHY, WITH LONG-TERM CURRENT USE OF INSULIN (H): ICD-10-CM

## 2019-05-17 RX ORDER — INSULIN ASPART 100 [IU]/ML
INJECTION, SOLUTION INTRAVENOUS; SUBCUTANEOUS
Qty: 30 ML | Refills: 1 | Status: SHIPPED | OUTPATIENT
Start: 2019-05-17 | End: 2019-07-19

## 2019-06-04 DIAGNOSIS — E11.42 TYPE 2 DIABETES, CONTROLLED, WITH PERIPHERAL NEUROPATHY (H): ICD-10-CM

## 2019-06-04 RX ORDER — FLURBIPROFEN SODIUM 0.3 MG/ML
SOLUTION/ DROPS OPHTHALMIC
Qty: 100 EACH | Refills: 0 | Status: SHIPPED | OUTPATIENT
Start: 2019-06-04 | End: 2019-06-26

## 2019-06-26 DIAGNOSIS — E11.42 TYPE 2 DIABETES, CONTROLLED, WITH PERIPHERAL NEUROPATHY (H): ICD-10-CM

## 2019-06-26 RX ORDER — FLURBIPROFEN SODIUM 0.3 MG/ML
SOLUTION/ DROPS OPHTHALMIC
Qty: 100 EACH | Refills: 0 | Status: SHIPPED | OUTPATIENT
Start: 2019-06-26 | End: 2020-01-20

## 2019-07-03 ENCOUNTER — DOCUMENTATION ONLY (OUTPATIENT)
Dept: LAB | Facility: CLINIC | Age: 69
End: 2019-07-03

## 2019-07-03 DIAGNOSIS — E78.5 HYPERLIPIDEMIA LDL GOAL <100: Primary | ICD-10-CM

## 2019-07-03 DIAGNOSIS — R80.9 MICROALBUMINURIA: ICD-10-CM

## 2019-07-03 DIAGNOSIS — E11.42 TYPE 2 DIABETES MELLITUS WITH DIABETIC POLYNEUROPATHY, WITH LONG-TERM CURRENT USE OF INSULIN (H): ICD-10-CM

## 2019-07-03 DIAGNOSIS — Z79.4 TYPE 2 DIABETES MELLITUS WITH DIABETIC POLYNEUROPATHY, WITH LONG-TERM CURRENT USE OF INSULIN (H): ICD-10-CM

## 2019-07-06 DIAGNOSIS — E11.40 TYPE 2 DIABETES MELLITUS WITH DIABETIC NEUROPATHY, WITHOUT LONG-TERM CURRENT USE OF INSULIN (H): ICD-10-CM

## 2019-07-06 DIAGNOSIS — E78.5 HYPERLIPIDEMIA LDL GOAL <100: ICD-10-CM

## 2019-07-08 RX ORDER — PRAVASTATIN SODIUM 80 MG/1
TABLET ORAL
Qty: 90 TABLET | Refills: 0 | Status: SHIPPED | OUTPATIENT
Start: 2019-07-08 | End: 2019-07-19

## 2019-07-09 DIAGNOSIS — E11.42 TYPE 2 DIABETES MELLITUS WITH DIABETIC POLYNEUROPATHY, WITH LONG-TERM CURRENT USE OF INSULIN (H): ICD-10-CM

## 2019-07-09 DIAGNOSIS — Z79.4 TYPE 2 DIABETES MELLITUS WITH DIABETIC POLYNEUROPATHY, WITH LONG-TERM CURRENT USE OF INSULIN (H): ICD-10-CM

## 2019-07-09 DIAGNOSIS — R80.9 MICROALBUMINURIA: ICD-10-CM

## 2019-07-09 LAB
CREAT UR-MCNC: 91 MG/DL
HBA1C MFR BLD: 6.4 % (ref 0–5.6)
MICROALBUMIN UR-MCNC: 37 MG/L
MICROALBUMIN/CREAT UR: 40.64 MG/G CR (ref 0–17)

## 2019-07-09 PROCEDURE — 82043 UR ALBUMIN QUANTITATIVE: CPT | Performed by: INTERNAL MEDICINE

## 2019-07-09 PROCEDURE — 83036 HEMOGLOBIN GLYCOSYLATED A1C: CPT | Performed by: INTERNAL MEDICINE

## 2019-07-09 PROCEDURE — 36415 COLL VENOUS BLD VENIPUNCTURE: CPT | Performed by: INTERNAL MEDICINE

## 2019-07-10 DIAGNOSIS — Z79.4 TYPE 2 DIABETES MELLITUS WITH DIABETIC POLYNEUROPATHY, WITH LONG-TERM CURRENT USE OF INSULIN (H): ICD-10-CM

## 2019-07-10 DIAGNOSIS — E11.42 TYPE 2 DIABETES MELLITUS WITH DIABETIC POLYNEUROPATHY, WITH LONG-TERM CURRENT USE OF INSULIN (H): ICD-10-CM

## 2019-07-19 ENCOUNTER — OFFICE VISIT (OUTPATIENT)
Dept: FAMILY MEDICINE | Facility: CLINIC | Age: 69
End: 2019-07-19
Payer: COMMERCIAL

## 2019-07-19 VITALS
RESPIRATION RATE: 20 BRPM | BODY MASS INDEX: 25.16 KG/M2 | OXYGEN SATURATION: 98 % | DIASTOLIC BLOOD PRESSURE: 72 MMHG | WEIGHT: 206.6 LBS | TEMPERATURE: 97.9 F | SYSTOLIC BLOOD PRESSURE: 116 MMHG | HEIGHT: 76 IN | HEART RATE: 68 BPM

## 2019-07-19 DIAGNOSIS — R80.9 MICROALBUMINURIA: ICD-10-CM

## 2019-07-19 DIAGNOSIS — Z13.6 SCREENING FOR AAA (ABDOMINAL AORTIC ANEURYSM): ICD-10-CM

## 2019-07-19 DIAGNOSIS — Z23 NEED FOR DIPHTHERIA-TETANUS-PERTUSSIS (TDAP) VACCINE: ICD-10-CM

## 2019-07-19 DIAGNOSIS — E11.42 TYPE 2 DIABETES MELLITUS WITH DIABETIC POLYNEUROPATHY, WITH LONG-TERM CURRENT USE OF INSULIN (H): Primary | ICD-10-CM

## 2019-07-19 DIAGNOSIS — E78.5 HYPERLIPIDEMIA LDL GOAL <100: ICD-10-CM

## 2019-07-19 DIAGNOSIS — Z79.4 TYPE 2 DIABETES MELLITUS WITH DIABETIC POLYNEUROPATHY, WITH LONG-TERM CURRENT USE OF INSULIN (H): Primary | ICD-10-CM

## 2019-07-19 DIAGNOSIS — Z13.89 SCREENING FOR DIABETIC PERIPHERAL NEUROPATHY: ICD-10-CM

## 2019-07-19 PROCEDURE — 99207 C PAF COMPLETED  NO CHARGE: CPT | Performed by: INTERNAL MEDICINE

## 2019-07-19 PROCEDURE — 90715 TDAP VACCINE 7 YRS/> IM: CPT | Performed by: INTERNAL MEDICINE

## 2019-07-19 PROCEDURE — 90471 IMMUNIZATION ADMIN: CPT | Performed by: INTERNAL MEDICINE

## 2019-07-19 PROCEDURE — 99207 C FOOT EXAM  NO CHARGE: CPT | Performed by: INTERNAL MEDICINE

## 2019-07-19 PROCEDURE — 99214 OFFICE O/P EST MOD 30 MIN: CPT | Mod: 25 | Performed by: INTERNAL MEDICINE

## 2019-07-19 RX ORDER — LISINOPRIL 5 MG/1
TABLET ORAL
Qty: 90 TABLET | Refills: 3 | Status: SHIPPED | OUTPATIENT
Start: 2019-07-19 | End: 2020-01-20

## 2019-07-19 RX ORDER — PRAVASTATIN SODIUM 80 MG/1
TABLET ORAL
Qty: 90 TABLET | Refills: 3 | Status: SHIPPED | OUTPATIENT
Start: 2019-07-19 | End: 2020-01-20

## 2019-07-19 ASSESSMENT — MIFFLIN-ST. JEOR: SCORE: 1809.66

## 2019-07-19 NOTE — PROGRESS NOTES
Last appointment with me was 7-3-2018    Preventative healthcare maintenance goals including      Health Maintenance   Topic Date Due     ZOSTER IMMUNIZATION (2 of 3) 09/27/2013     MEDICARE ANNUAL WELLNESS VISIT  06/07/2017     PNEUMOCOCCAL IMMUNIZATION 65+ LOW/MEDIUM RISK (2 of 2 - PPSV23) 06/07/2017     AORTIC ANEURYSM SCREENING (SYSTEM ASSIGNED)  06/07/2017     DTAP/TDAP/TD IMMUNIZATION (3 - Td) 07/18/2018     FALL RISK ASSESSMENT  12/21/2018     PHQ-2  01/01/2019     DIABETIC FOOT EXAM  07/03/2019     INFLUENZA VACCINE (1) 09/01/2019     EYE EXAM  10/24/2019     A1C  01/09/2020     BMP  01/17/2020     LIPID  01/17/2020     TSH W/FREE T4 REFLEX  06/27/2020     MICROALBUMIN  07/09/2020     ADVANCE CARE PLANNING  06/13/2022     COLONOSCOPY  11/08/2026     HEPATITIS C SCREENING  Completed     IPV IMMUNIZATION  Aged Out     MENINGITIS IMMUNIZATION  Aged Out

## 2019-07-19 NOTE — PATIENT INSTRUCTIONS
1) Today we discussed and advised the new shingles vaccine (ShingRx) which you can get at any pharmacy. You will then follow-up 2-6 months later for a second booster.     2) We discussed AAA (abdominal aortic aneurysm) screening at today's visit. This is a simple ultrasound test that is covered by your insurance. If this test comes back normal it will be the only time you will have to do this. The system identifies you as at risk based on age and gender.     3) We will plan to give the tetanus booster today.

## 2019-07-19 NOTE — PROGRESS NOTES
Subjective     Last appointment with me was 7-3-2018     Felipe Campbell is a 69 year old male who presents to clinic today for the following health issues:    HPI   Diabetes Mellitus Type 2   He reports that after starting insulin his diabetes really seemed to get under control. His problem area a few years ago seemed to be post meal spikes and he has been using short acting insulin with adjustments with carbohydrate counts. Continues treatment with metformin and Lantus (Insulin Glargine) . Continues with regular eye exams. Follows with Dr. Monet for Posterior vitreous detachment history as well. Patient does regular foot exams. He recently noted a small ridge bilaterally on his big toe nails.     Lab Results   Component Value Date    A1C 6.4 07/09/2019    A1C 6.3 01/17/2019    A1C 6.3 10/01/2018    A1C 6.1 06/27/2018    A1C 6.2 12/12/2017     Additional Information   New shingrix vaccine discussed with patient today. Discussed AAA (abdominal aortic aneurysm) screening at today's visit. Tdap discussed today. Mentions that he has been participating in [employer] and he notes improvement in balance and physical health. He also does regular biking or stationary bike exercise year round.      Patient Active Problem List   Diagnosis     Erectile dysfunction of organic origin     HYPERLIPIDEMIA LDL GOAL <100     Microalbuminuria     Advanced directives, counseling/discussion     Hayfever     Type 2 diabetes mellitus with diabetic polyneuropathy, with long-term current use of insulin (H)     CKD (chronic kidney disease) stage 2, GFR 60-89 ml/min     Posterior vitreous detachment of both eyes     Combined forms of age-related cataract, mild, of both eyes     Past Surgical History:   Procedure Laterality Date     C APPENDECTOMY  07/20/69     COLONOSCOPY WITH CO2 INSUFFLATION N/A 11/8/2016    Procedure: COLONOSCOPY WITH CO2 INSUFFLATION;  Surgeon: Torey Almazan MD;  Location: [facility]     HERNIA REPAIR, INGUINAL RT/LT  " age 5    left     TUNA         Social History     Tobacco Use     Smoking status: Former Smoker     Types: Cigarettes     Last attempt to quit: 1979     Years since quittin.5     Smokeless tobacco: Never Used   Substance Use Topics     Alcohol use: Yes     Comment: occasionally     Family History   Problem Relation Age of Onset     Breast Cancer Mother      Diabetes Father      Diabetes Maternal Grandmother      Congenital Anomalies Sister      Psychotic Disorder Daughter          BP Readings from Last 3 Encounters:   19 116/72   19 118/74   10/09/18 118/64    Wt Readings from Last 3 Encounters:   19 93.7 kg (206 lb 9.6 oz)   10/09/18 92.3 kg (203 lb 8 oz)   18 93.9 kg (207 lb)         Reviewed and updated as needed this visit by Provider       Review of Systems   ROS COMP: Constitutional, HEENT, cardiovascular, pulmonary, GI, , musculoskeletal, neuro, skin, endocrine and psych systems are negative, except as otherwise noted.    This document serves as a record of the services and decisions personally performed and made by Curt Schneider MD. It was created on his behalf by Kev Lomeli, a trained medical scribe. The creation of this document is based on the provider's statements to the medical scribe.  Kev Lomeli 11:28 AM 2019      Objective    /72   Pulse 68   Temp 97.9  F (36.6  C) (Oral)   Resp 20   Ht 1.924 m (6' 3.75\")   Wt 93.7 kg (206 lb 9.6 oz)   SpO2 98%   BMI 25.31 kg/m    Body mass index is 25.31 kg/m .  Physical Exam   GENERAL: healthy, alert and no distress  EYES: Eyes grossly normal to inspection, PERRL and conjunctivae and sclerae normal  RESP: lungs clear to auscultation - no rales, rhonchi or wheezes  CV: regular rate and rhythm, normal S1 S2, no S3 or S4, no murmur, click or rub, no peripheral edema and peripheral pulses strong  SKIN: no suspicious lesions or rashes to visible skin, he has a small ridge on first toe nails bilaterally, " no worrisome features with this  NEURO: Normal strength and tone, mentation intact and speech normal  PSYCH: mentation appears normal, affect normal/bright    Diagnostic Test Results:  Labs reviewed in Epic  No results found for this or any previous visit (from the past 24 hour(s)).      Assessment & Plan     (Z13.6) Screening for AAA (abdominal aortic aneurysm)  (primary encounter diagnosis)  Comment: routine screening   Plan: PAF COMPLETED, US Aorta Medicare AAA Screening            (E11.42,  Z79.4) Type 2 diabetes mellitus with diabetic polyneuropathy, with long-term current use of insulin (H)  Comment: absolutely marvelous control  Plan: PAF COMPLETED, insulin glargine (LANTUS         SOLOSTAR) 100 UNIT/ML pen, insulin pen needle         (31G X 6 MM) 31G X 6 MM miscellaneous,         metFORMIN (GLUCOPHAGE) 500 MG tablet, insulin         aspart (NOVOLOG FLEXPEN) 100 UNIT/ML pen        Continue current plan of care , recheck in 6 months recommended     (R80.9) Microalbuminuria  Comment: slightly elevated but not to a degree we are concerned with   Plan: PAF COMPLETED, lisinopril (PRINIVIL/ZESTRIL) 5         MG tablet            (E78.5) Hyperlipidemia LDL goal <100  Comment: continue current plan of care , recheck fasting lipid panel in 6 months   Plan: PAF COMPLETED, pravastatin (PRAVACHOL) 80 MG         tablet            (Z23) Need for diphtheria-tetanus-pertussis (Tdap) vaccine  Comment: updated immunizations  Plan: TDAP, IM (10 - 64 YRS) - Adacel            (Z13.89) Screening for diabetic peripheral neuropathy  Comment: normal anatomic exam, mild to moderate diabetes neuropathy is present  Plan: FOOT EXAM            No follow-ups on file.    The information in this document, created by the medical scribe for me, accurately reflects the services I personally performed and the decisions made by me. I have reviewed and approved this document for accuracy prior to leaving the patient care area.  July 19, 2019 11:35  AM    Curt Schneider MD  [facility]

## 2019-07-23 ENCOUNTER — ANCILLARY PROCEDURE (OUTPATIENT)
Dept: ULTRASOUND IMAGING | Facility: CLINIC | Age: 69
End: 2019-07-23
Attending: INTERNAL MEDICINE
Payer: COMMERCIAL

## 2019-07-23 DIAGNOSIS — Z13.6 SCREENING FOR AAA (ABDOMINAL AORTIC ANEURYSM): ICD-10-CM

## 2019-07-23 PROCEDURE — 76706 US ABDL AORTA SCREEN AAA: CPT

## 2019-08-09 ENCOUNTER — NURSE TRIAGE (OUTPATIENT)
Dept: NURSING | Facility: CLINIC | Age: 69
End: 2019-08-09

## 2019-08-10 NOTE — TELEPHONE ENCOUNTER
"DM Type 2, takes insulin. Started feeling a little nauseated yesterday off and on. Felt better this AM but nausea again after breakfast. No vomiting, 1-2 loose stools, no fever. Began feeling lightheaded. He thought BG must be low but when he checked BG it was 152.. Repeat  and sx not improved after taking glucose tabs. Now on his way to Urgent Care (wife driving) because he is feeling so dizzy he must hold onto furniture to walk. Advised ED now.     Reason for Disposition    SEVERE dizziness (e.g., unable to stand, requires support to walk, feels like passing out now)    Additional Information    Negative: Severe difficulty breathing (e.g., struggling for each breath, speaks in single words)    Negative: [1] Difficulty breathing or swallowing AND [2] started suddenly after medicine, an allergic food or bee sting    Negative: Shock suspected (e.g., cold/pale/clammy skin, too weak to stand, low BP, rapid pulse)    Negative: Difficult to awaken or acting confused (e.g., disoriented, slurred speech)    Negative: [1] Weakness (i.e., paralysis, loss of muscle strength) of the face, arm or leg on one side of the body AND [2] sudden onset AND [3] present now    Negative: [1] Numbness (i.e., loss of sensation) of the face, arm or leg on one side of the body AND [2] sudden onset AND [3] present now    Negative: [1] Loss of speech or garbled speech AND [2] sudden onset AND [3] present now    Negative: Overdose (accidental or intentional) of medications    Negative: [1] Fainted > 15 minutes ago AND [2] still feels too weak or dizzy to stand    Negative: Heart beating < 50 beats per minute OR > 140 beats per minute    Negative: Sounds like a life-threatening emergency to the triager    Negative: Chest pain    Negative: Rectal bleeding, bloody stool, or tarry-black stool    Negative: [1] Vomiting AND [2] contains red blood or black (\"coffee ground\") material    Negative: Vomiting is main symptom    Negative: Diarrhea is " main symptom    Negative: Headache is main symptom    Negative: Patient states that he/she is having an anxiety/panic attack    Negative: Dizziness from low blood sugar (i.e., < 60 mg/dl or 3.5 mmol/l)    Negative: Dizziness is described as a spinning sensation (i.e., vertigo)    Negative: Heat exhaustion suspected (i.e., dehydration from heat exposure)    Negative: Difficulty breathing    Protocols used: DIZZINESS - [id number]

## 2019-10-24 DIAGNOSIS — E11.42 TYPE 2 DIABETES MELLITUS WITH DIABETIC POLYNEUROPATHY, WITH LONG-TERM CURRENT USE OF INSULIN (H): ICD-10-CM

## 2019-10-24 DIAGNOSIS — Z79.4 TYPE 2 DIABETES MELLITUS WITH DIABETIC POLYNEUROPATHY, WITH LONG-TERM CURRENT USE OF INSULIN (H): ICD-10-CM

## 2019-10-28 ENCOUNTER — OFFICE VISIT (OUTPATIENT)
Dept: OPTOMETRY | Facility: CLINIC | Age: 69
End: 2019-10-28
Payer: COMMERCIAL

## 2019-10-28 ENCOUNTER — OFFICE VISIT (OUTPATIENT)
Dept: OPHTHALMOLOGY | Facility: CLINIC | Age: 69
End: 2019-10-28
Payer: COMMERCIAL

## 2019-10-28 DIAGNOSIS — H52.4 PRESBYOPIA: ICD-10-CM

## 2019-10-28 DIAGNOSIS — Z79.4 TYPE 2 DIABETES MELLITUS WITH DIABETIC POLYNEUROPATHY, WITH LONG-TERM CURRENT USE OF INSULIN (H): ICD-10-CM

## 2019-10-28 DIAGNOSIS — Z01.01 ENCOUNTER FOR EXAMINATION OF EYES AND VISION WITH ABNORMAL FINDINGS: Primary | ICD-10-CM

## 2019-10-28 DIAGNOSIS — H52.13 MYOPIA OF BOTH EYES: ICD-10-CM

## 2019-10-28 DIAGNOSIS — H43.813 POSTERIOR VITREOUS DETACHMENT OF BOTH EYES: ICD-10-CM

## 2019-10-28 DIAGNOSIS — H25.813 COMBINED FORMS OF AGE-RELATED CATARACT OF BOTH EYES: ICD-10-CM

## 2019-10-28 DIAGNOSIS — E11.42 TYPE 2 DIABETES MELLITUS WITH DIABETIC POLYNEUROPATHY, WITH LONG-TERM CURRENT USE OF INSULIN (H): ICD-10-CM

## 2019-10-28 DIAGNOSIS — Z46.0 CONTACT LENS/GLASSES FITTING: Primary | ICD-10-CM

## 2019-10-28 PROCEDURE — 92310 CONTACT LENS FITTING OU: CPT | Performed by: OPTOMETRIST

## 2019-10-28 PROCEDURE — 92015 DETERMINE REFRACTIVE STATE: CPT | Performed by: OPHTHALMOLOGY

## 2019-10-28 PROCEDURE — 92014 COMPRE OPH EXAM EST PT 1/>: CPT | Performed by: OPHTHALMOLOGY

## 2019-10-28 PROCEDURE — 99207 ZZC NO CHARGE LOS: CPT | Performed by: OPTOMETRIST

## 2019-10-28 ASSESSMENT — KERATOMETRY
OS_AXISANGLE_DEGREES: 002
OD_AXISANGLE_DEGREES: 001
OS_K2POWER_DIOPTERS: 44.50
OD_AXISANGLE2_DEGREES: 091
METHOD_AUTO_MANUAL: AUTOMATED
OS_AXISANGLE2_DEGREES: 092
OS_K1POWER_DIOPTERS: 44.00
OD_K1POWER_DIOPTERS: 44.00
OD_K2POWER_DIOPTERS: 44.50

## 2019-10-28 ASSESSMENT — SLIT LAMP EXAM - LIDS
COMMENTS: NORMAL
COMMENTS: NORMAL

## 2019-10-28 ASSESSMENT — REFRACTION_WEARINGRX
OD_ADD: +2.24
OD_SPHERE: -6.00
OS_AXIS: 002
OD_AXIS: 138
OS_CYLINDER: +0.75
OS_SPHERE: -3.75
SPECS_TYPE: PAL
OS_ADD: +2.27
OD_CYLINDER: +0.75

## 2019-10-28 ASSESSMENT — EXTERNAL EXAM - RIGHT EYE: OD_EXAM: 2+ BROW PTOSIS, MILD TO MOD BROW

## 2019-10-28 ASSESSMENT — TONOMETRY
OS_IOP_MMHG: 15
OD_IOP_MMHG: 15
IOP_METHOD: APPLANATION

## 2019-10-28 ASSESSMENT — REFRACTION_MANIFEST
OD_CYLINDER: +0.75
OS_SPHERE: -5.25
OD_SPHERE: -6.50
OS_CYLINDER: +0.75
OS_AXIS: 006
OS_ADD: +2.50
OD_AXIS: 142
OD_ADD: +2.50

## 2019-10-28 ASSESSMENT — CUP TO DISC RATIO
OD_RATIO: 0.3
OS_RATIO: 0.3

## 2019-10-28 ASSESSMENT — VISUAL ACUITY
METHOD: SNELLEN - LINEAR
OS_PH_CC: 20/30
OS_CC+: -1
OS_CC: 2
METHOD: SNELLEN - LINEAR
CORRECTION_TYPE: GLASSES
OS_CC: 20/50
OD_CC: 2
OD_CC+: -2
OD_CC+: -1
OD_CC: 20/30
OS_CC: 20/50
OD_CC: 20/40
CORRECTION_TYPE: GLASSES

## 2019-10-28 ASSESSMENT — EXTERNAL EXAM - LEFT EYE: OS_EXAM: 2+ BROW PTOSIS, MILD TO MOD BROW

## 2019-10-28 ASSESSMENT — CONF VISUAL FIELD
OS_NORMAL: 1
OD_NORMAL: 1

## 2019-10-28 NOTE — PROGRESS NOTES
Contact Lens Fitting    Patient is here today for contact lens fitting. Patient currently wear RGP bifocal lens. Patient had CE 10- with Dr. Monet.    Yuliya Lozano    Optometry Tech

## 2019-10-28 NOTE — PROGRESS NOTES
Current Eye Medications:  None.         Subjective:  Patient is here for a Diabetic Eye Exam.  He primarily wears multi-focal RGP contact lenses from the [employer], but they no longer accept his insurance.  He is probably going to schedule an appointment with Dr. Lopez for a contact lens fitting.  He has noticed some new crescent-shaped light flashes in the peripheral vision of his left eye for the past month (similar to the symptoms in his right eye, which started one year ago).   He is wearing his glasses today which are working adequately.     Lab Results   Component Value Date    A1C 6.4 07/09/2019    A1C 6.3 01/17/2019    A1C 6.3 10/01/2018    A1C 6.1 06/27/2018    A1C 6.2 12/12/2017        Objective:  See Ophthalmology Exam.       Assessment:  Worsening of nuclear sclerotic cataracts both eyes with myopic shift; otherwise stable eye exam.  No diabetic retinopathy.      ICD-10-CM    1. Encounter for examination of eyes and vision with abnormal findings Z01.01 REFRACTIVE STATUS   2. Presbyopia H52.4 REFRACTIVE STATUS   3. Type 2 diabetes mellitus with diabetic polyneuropathy, with long-term current use of insulin (H) E11.42 EYE EXAM (SIMPLE-NONBILLABLE)    Z79.4    4. Combined forms of age-related cataract, mild-mod, of both eyes H25.813 EYE EXAM (SIMPLE-NONBILLABLE)   5. Posterior vitreous detachment of both eyes H43.813         Plan:  Glasses Rx given - optional  Use artificial tears up to 4 times daily both eyes.  (Refresh Tears, Systane Ultra/Balance, or Theratears)  Call in June 2020 for an appointment in October 2020 for Complete Exam.    Dr. Monet [phone]

## 2019-10-28 NOTE — LETTER
10/28/2019         RE: Felipe Campbell  [address]  [place] [address]        Dear Colleague,    Thank you for referring your patient, Felipe Campbell, to the [facility]. Please see a copy of my visit note below.     Current Eye Medications:  None.         Subjective:  Patient is here for a Diabetic Eye Exam.  He primarily wears multi-focal RGP contact lenses from the [employer], but they no longer accept his insurance.  He is probably going to schedule an appointment with Dr. Lopez for a contact lens fitting.  He has noticed some new crescent-shaped light flashes in the peripheral vision of his left eye for the past month (similar to the symptoms in his right eye, which started one year ago).   He is wearing his glasses today which are working adequately.     Lab Results   Component Value Date    A1C 6.4 07/09/2019    A1C 6.3 01/17/2019    A1C 6.3 10/01/2018    A1C 6.1 06/27/2018    A1C 6.2 12/12/2017        Objective:  See Ophthalmology Exam.       Assessment:  Worsening of nuclear sclerotic cataracts both eyes with myopic shift; otherwise stable eye exam.  No diabetic retinopathy.      ICD-10-CM    1. Encounter for examination of eyes and vision with abnormal findings Z01.01 REFRACTIVE STATUS   2. Presbyopia H52.4 REFRACTIVE STATUS   3. Type 2 diabetes mellitus with diabetic polyneuropathy, with long-term current use of insulin (H) E11.42 EYE EXAM (SIMPLE-NONBILLABLE)    Z79.4    4. Combined forms of age-related cataract, mild-mod, of both eyes H25.813 EYE EXAM (SIMPLE-NONBILLABLE)   5. Posterior vitreous detachment of both eyes H43.813         Plan:  Glasses Rx given - optional  Use artificial tears up to 4 times daily both eyes.  (Refresh Tears, Systane Ultra/Balance, or Theratears)  Call in June 2020 for an appointment in October 2020 for Complete Exam.    Dr. Monet [phone]         Again, thank you for allowing me to participate in the care of your patient.         Sincerely,        Sonny Monet MD

## 2019-10-28 NOTE — PATIENT INSTRUCTIONS
Glasses Rx given - optional  Use artificial tears up to 4 times daily both eyes.  (Refresh Tears, Systane Ultra/Balance, or Theratears)  Call in June 2020 for an appointment in October 2020 for Complete Exam.    Dr. Monet [phone]    Patient Education   Diabetes weakens the blood vessels all over the body, including the eyes. Damage to the blood vessels in the eyes can cause swelling or bleeding into part of the eye (called the retina). This is called diabetic retinopathy ([facility]-tin--[facility]-the). If not treated, this disease can cause vision loss or blindness.   Symptoms may include blurred or distorted vision, but many people have no symptoms. It's important to see your eye doctor regularly to check for problems.   Early treatment and good control can help protect your vision. Here are the things you can do to help prevent vision loss:      1. Keep your blood sugar levels under tight control.      2. Bring high blood pressure under control.      3. No smoking.      4. Have yearly dilated eye exams.

## 2019-10-28 NOTE — LETTER
10/28/2019         RE: Felipe Campbell  [address]  [place] [address]        Dear Colleague,    Thank you for referring your patient, Felipe Campbell, to the [facility]. Please see a copy of my visit note below.    Contact Lens Fitting    Patient is here today for contact lens fitting. Patient currently wear RGP bifocal lens. Patient had CE 10- with Dr. Monet.    Yuliya Lozano    Optometry Tech      Again, thank you for allowing me to participate in the care of your patient.        Sincerely,        Frederic Lopez OD

## 2019-10-29 ENCOUNTER — TELEPHONE (OUTPATIENT)
Dept: OPTOMETRY | Facility: CLINIC | Age: 69
End: 2019-10-29

## 2019-10-29 ASSESSMENT — REFRACTION_CURRENTRX
OS_ADD: +3.00
OS_DIAMETER: 9.5
OS_SPHERE: -4.00
OS_DIAMETER: 9.5
OS_BASECURVE: 7.72
OD_BASECURVE: 7.65
OD_ADD: +3.00
OD_SPHERE: -5.50
OD_DIAMETER: 9.5
OS_BASECURVE: 7.72
OS_ADD: +3.00
OD_BASECURVE: 7.65
OD_ADD: +3.00
OS_SPHERE: -4.00
OD_DIAMETER: 9.5
OD_SPHERE: -5.50

## 2019-10-29 NOTE — PATIENT INSTRUCTIONS
Current wearer of Renovation Multifocal RGP contact lenses ([employer]).   We are unable to order through [employer].   Will order similar lens from [employer] and trial.     Call patient when lenses arrive to set up dispense appointment.     Frederic Lopez O.D.  [address] [place]  [address]    [phone]

## 2019-10-29 NOTE — TELEPHONE ENCOUNTER
10/29/2019    Order trials for patient and when they come in call patient for a dispense appt.      Right X-Debbie: ProPlus Multifocal 7.65 9.5 -5.50 +3.00    Left X-Debbie: ProPlus Multifocal 7.72 9.5 -4.00 +3.00        Patient still needs to pay for the lens. Patient can pay at the dispense appt. $200.00 per lens.    Patient already paid the fitting fee.  [employer]    11/6/2019    Patient has dispense appt on Thursday Nov 7 at 1:40.    [employer]    11/7/2019    Dispensed RGP lens.    [employer] Tech

## 2019-11-07 ENCOUNTER — OFFICE VISIT (OUTPATIENT)
Dept: OPTOMETRY | Facility: CLINIC | Age: 69
End: 2019-11-07
Payer: COMMERCIAL

## 2019-11-07 DIAGNOSIS — H52.13 MYOPIA OF BOTH EYES: ICD-10-CM

## 2019-11-07 DIAGNOSIS — Z46.0 CONTACT LENS/GLASSES FITTING: Primary | ICD-10-CM

## 2019-11-07 DIAGNOSIS — H52.4 PRESBYOPIA: ICD-10-CM

## 2019-11-07 PROCEDURE — 99207 ZZC NO BILLABLE SERVICE THIS VISIT: CPT | Performed by: OPTOMETRIST

## 2019-11-07 ASSESSMENT — EXTERNAL EXAM - LEFT EYE: OS_EXAM: 2+ BROW PTOSIS, MILD TO MOD BROW

## 2019-11-07 ASSESSMENT — VISUAL ACUITY
OS_CC+: -1
OD_CC: 20/80-
OD_CC: 20/30-
OS_CC: 20/25
METHOD: SNELLEN - LINEAR
CORRECTION_TYPE: CONTACTS
OS_CC: 20/30-

## 2019-11-07 ASSESSMENT — REFRACTION_CURRENTRX
OD_SPHERE: -5.50
OD_ADD: +3.00
OS_BASECURVE: 7.72
OS_SPHERE: -4.00
OS_ADD: +3.00
OD_BASECURVE: 7.65
OD_DIAMETER: 9.5
OS_DIAMETER: 9.5

## 2019-11-07 ASSESSMENT — SLIT LAMP EXAM - LIDS
COMMENTS: NORMAL
COMMENTS: NORMAL

## 2019-11-07 ASSESSMENT — EXTERNAL EXAM - RIGHT EYE: OD_EXAM: 2+ BROW PTOSIS, MILD TO MOD BROW

## 2019-11-07 NOTE — PATIENT INSTRUCTIONS
ProPlus Multifocal GP lenses dispensed today.     Trial the lenses for at least 1 week. If inadequate, contact our clinic and we will decide which lens we need to order as a replacement.       Frederic Lopez O.D.  [address] [place]  [address]    [phone]

## 2019-11-07 NOTE — LETTER
11/7/2019         RE: Felipe Campbell  [address]  [place] [address]        Dear Colleague,    Thank you for referring your patient, Felipe Campbell, to the [facility]. Please see a copy of my visit note below.    Chief Complaint   Patient presents with     Contact Lens Follow Up       Contact lenses dispensed    Yuliya Lozano Optometric Tech       OBJECTIVE: See Ophthalmology exam     ASSESSMENT:    ICD-10-CM    1. Contact lens/glasses fitting Z46.0    2. Myopia of both eyes H52.13    3. Presbyopia H52.4       PLAN:  Patient Instructions   ProPlus Multifocal GP lenses dispensed today.     Trial the lenses for at least 1 week. If inadequate, contact our clinic and we will decide which lens we need to order as a replacement.       Frederic Lopez O.D.  [address]  [place]  [address]    [phone]              Again, thank you for allowing me to participate in the care of your patient.        Sincerely,        Frederic Lopez OD

## 2019-11-07 NOTE — PROGRESS NOTES
Chief Complaint   Patient presents with     Contact Lens Follow Up       Contact lenses dispensed    Yuliya Lozano Optometric Tech       OBJECTIVE: See Ophthalmology exam     ASSESSMENT:    ICD-10-CM    1. Contact lens/glasses fitting Z46.0    2. Myopia of both eyes H52.13    3. Presbyopia H52.4       PLAN:  Patient Instructions   ProPlus Multifocal GP lenses dispensed today.     Trial the lenses for at least 1 week. If inadequate, contact our clinic and we will decide which lens we need to order as a replacement.       Frederic Lopez O.D.  [address]  [address]    [phone]

## 2019-11-14 ENCOUNTER — TELEPHONE (OUTPATIENT)
Dept: OPTOMETRY | Facility: CLINIC | Age: 69
End: 2019-11-14

## 2019-11-14 NOTE — TELEPHONE ENCOUNTER
Called and spoke with patient over the telephone. Instructed patient to return to clinic for CL follow-up wearing his old GP contact lenses (Renovation MF). This is the pair of lenses that works well for him, but the lenses are a couple of years old. Bring in the lenses from us (ProPlus MF) and we can send them back to [facility].       Frederic Lopez O.D.  [facility] [address]  [place]  [address]    [phone]

## 2019-11-18 ENCOUNTER — OFFICE VISIT (OUTPATIENT)
Dept: OPTOMETRY | Facility: CLINIC | Age: 69
End: 2019-11-18
Payer: COMMERCIAL

## 2019-11-18 DIAGNOSIS — H52.13 MYOPIA OF BOTH EYES: ICD-10-CM

## 2019-11-18 DIAGNOSIS — N52.9 ERECTILE DYSFUNCTION OF ORGANIC ORIGIN: ICD-10-CM

## 2019-11-18 DIAGNOSIS — Z46.0 CONTACT LENS/GLASSES FITTING: Primary | ICD-10-CM

## 2019-11-18 DIAGNOSIS — H52.4 PRESBYOPIA: ICD-10-CM

## 2019-11-18 PROCEDURE — 99207 ZZC NO CHARGE LOS: CPT | Performed by: OPTOMETRIST

## 2019-11-18 ASSESSMENT — REFRACTION_CURRENTRX
OD_DIAMETER: 9.5
OD_ADD: +3.00
OS_DIAMETER: 9.5
OS_SPHERE: -4.00
OS_ADD: +3.00
OD_BASECURVE: 7.65
OS_SPHERE: -4.00
OD_BASECURVE: 7.65
OS_DIAMETER: 9.5
OD_SPHERE: -5.50
OD_SPHERE: -5.50
OD_ADD: +3.00
OS_BASECURVE: 7.72
OS_ADD: +3.00
OD_DIAMETER: 9.5
OS_BASECURVE: 7.72

## 2019-11-18 ASSESSMENT — VISUAL ACUITY
METHOD: SNELLEN - LINEAR
OD_CC+: -1
CORRECTION_TYPE: CONTACTS

## 2019-11-18 ASSESSMENT — SLIT LAMP EXAM - LIDS
COMMENTS: NORMAL
COMMENTS: NORMAL

## 2019-11-18 ASSESSMENT — EXTERNAL EXAM - RIGHT EYE: OD_EXAM: 2+ BROW PTOSIS, MILD TO MOD BROW

## 2019-11-18 ASSESSMENT — EXTERNAL EXAM - LEFT EYE: OS_EXAM: 2+ BROW PTOSIS, MILD TO MOD BROW

## 2019-11-18 NOTE — PROGRESS NOTES
Chief Complaint   Patient presents with     Contact Lens Follow Up     Satisfied with contacts:  No     Good comfort:  Yes  Clear vision:     No, patient is not happy with the vision with the lens.patient is wearing previous rx lens to appointment.    Yuliya Lozano, Optometric Tech          Medical, surgical and family histories reviewed and updated 11/18/2019.       OBJECTIVE: See Ophthalmology exam    ASSESSMENT:    ICD-10-CM    1. Contact lens/glasses fitting Z46.0    2. Myopia of both eyes H52.13    3. Presbyopia H52.4       PLAN:    Patient Instructions   Unsuccessful trial of ProPlus MF contact lenses.   Patient prefers previous lenses- Renovation Multifocal ([employer]).    Renovation Multifocal contact lens prescription provided today.       Frederic Lopez O.D.  [facility] - [address] [place]  [address] [phone]

## 2019-11-18 NOTE — LETTER
11/18/2019         RE: Felipe Campbell  [address]  [place] [address]        Dear Colleague,    Thank you for referring your patient, Felipe Campbell, to the [facility]. Please see a copy of my visit note below.    Chief Complaint   Patient presents with     Contact Lens Follow Up     Satisfied with contacts:  No     Good comfort:  Yes  Clear vision:     No, patient is not happy with the vision with the lens.patient is wearing previous rx lens to appointment.    Yuliya Lozano, Optometric Tech          Medical, surgical and family histories reviewed and updated 11/18/2019.       OBJECTIVE: See Ophthalmology exam    ASSESSMENT:    ICD-10-CM    1. Contact lens/glasses fitting Z46.0    2. Myopia of both eyes H52.13    3. Presbyopia H52.4       PLAN:    Patient Instructions   Unsuccessful trial of ProPlus MF contact lenses.   Patient prefers previous lenses- Renovation Multifocal ([employer]).    Renovation Multifocal contact lens prescription provided today.       Frederic Lopez O.D.  [address] [place]  [address]    [phone]                       Again, thank you for allowing me to participate in the care of your patient.        Sincerely,        Frederic Lopez, OD

## 2019-11-19 RX ORDER — SILDENAFIL CITRATE 20 MG/1
TABLET ORAL
Qty: 30 TABLET | Refills: 3 | Status: SHIPPED | OUTPATIENT
Start: 2019-11-19 | End: 2020-01-20

## 2019-12-09 ENCOUNTER — OFFICE VISIT (OUTPATIENT)
Dept: PHARMACY | Facility: CLINIC | Age: 69
End: 2019-12-09
Payer: COMMERCIAL

## 2019-12-09 DIAGNOSIS — E11.42 TYPE 2 DIABETES MELLITUS WITH DIABETIC POLYNEUROPATHY, WITH LONG-TERM CURRENT USE OF INSULIN (H): Primary | ICD-10-CM

## 2019-12-09 DIAGNOSIS — R80.9 MICROALBUMINURIA: ICD-10-CM

## 2019-12-09 DIAGNOSIS — M17.0 OSTEOARTHRITIS OF BOTH KNEES, UNSPECIFIED OSTEOARTHRITIS TYPE: ICD-10-CM

## 2019-12-09 DIAGNOSIS — Z79.4 TYPE 2 DIABETES MELLITUS WITH DIABETIC POLYNEUROPATHY, WITH LONG-TERM CURRENT USE OF INSULIN (H): Primary | ICD-10-CM

## 2019-12-09 DIAGNOSIS — J30.1 HAYFEVER: ICD-10-CM

## 2019-12-09 DIAGNOSIS — G47.00 INSOMNIA, UNSPECIFIED TYPE: ICD-10-CM

## 2019-12-09 DIAGNOSIS — E78.5 HYPERLIPIDEMIA LDL GOAL <100: ICD-10-CM

## 2019-12-09 DIAGNOSIS — N52.9 ERECTILE DYSFUNCTION OF ORGANIC ORIGIN: ICD-10-CM

## 2019-12-09 PROCEDURE — 99606 MTMS BY PHARM EST 15 MIN: CPT | Performed by: PHARMACIST

## 2019-12-09 PROCEDURE — 99607 MTMS BY PHARM ADDL 15 MIN: CPT | Performed by: PHARMACIST

## 2019-12-09 NOTE — PROGRESS NOTES
SUBJECTIVE/OBJECTIVE:                Felipe Campbell is a 69 year old male coming in for a follow-up visit for Medication Therapy Management.  He was referred to me from Curt Schneider    Chief Complaint: Follow up from [facility] visit on 5/13/19.  Insurance- Lantus, primes but sig doesn't allow for this. Always has enough Novolog.   Personal Healthcare Goals: If pt meets with [facility] 2x/year, gets testing supplies for free.    Diabetes is always a focus, his father passed away from diabetes (renal failure).  Tobacco:  reports that he quit smoking about 40 years ago. His smoking use included cigarettes. He smoked 0.00 packs per day. He has never used smokeless tobacco.  Alcohol: Less than 1 beverages / week    Medication Adherence/Access:  No concerns  The patient fills medications at [facility]: NO, fills medications at [facility].    Diabetes:  Pt currently taking metformin 1000mg BID, Lantus 6 units daily and Novolog 4-5 units TID with meals (generally 1 unit per carb choice plus one additional unit). Pt has been on this regimen for two years. See chief concern above.  Pt is not experiencing side effects.    SMBG: TID, generally before meals.   Ranges (per patient):   Symptoms of low blood sugar? none. Frequency of hypoglycemia? None.  He does have glucagon available if needed, and his wife is familiar with administration.  He was having some overnight hypoglycemia episodes when he was taking 10 units of Lantus, none since dose was reduced.  Recent symptoms of high blood sugar? none  Eye exam: up to date  Foot exam: up to date  Microalbumin is not < 30 mg/g. Pt is taking an ACEi/ARB.  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  Lab Results   Component Value Date    A1C 6.4 07/09/2019     A1C 6.3 01/17/2019    A1C 6.3 10/01/2018    A1C 6.1 06/27/2018    A1C 6.2 12/12/2017     Microalbuminuria:  Pt is taking lisinopril 5mg daily.  He denies side effects of therapy.  Urine Albumin:   Lab Results   Component Value Date    UMALCR 40.64 (H) 07/09/2019   ]  BP Readings from Last 3 Encounters:   07/19/19 116/72   05/13/19 118/74   10/09/18 118/64     Hyperlipidemia: Current therapy includes pravastatin 80mg once daily.  Pt reports no significant myalgias or other side effects.   The 10-year ASCVD risk score (Aniya SAINI Jr., et al., 2013) is: 18.4%    Values used to calculate the score:      Age: 69 years      Sex: Male      Is Non- : No      Diabetic: Yes      Tobacco smoker: No      Systolic Blood Pressure: 116 mmHg      Is BP treated: No      HDL Cholesterol: 63 mg/dL      Total Cholesterol: 168 mg/dL    Recent Labs   Lab Test 01/17/19  0810 12/12/17  0709  06/15/15  0741  03/14/14  0726   CHOL 168 150   < > 156  --  213*   HDL 63 69   < > 58  --  44   LDL 82 63   < > 75   < > 119   TRIG 113 91   < > 115  --  248*   CHOLHDLRATIO  --   --   --  2.7  --  4.9    < > = values in this interval not displayed.     Insomnia: Has trouble falling asleep. He continues using melatonin as needed, which he finds effective.  He generally takes 2mg when needed and denies side effects.    ED:  He's now taking sildenafil 40-80mg as needed and reports this is effective.  He denies side effects.    Arthritis:  He's been taking APAP 1000mg PRN and biofreeze PRN for knee/shoulder pain (chet shoulders, bass player), and has found this effective.  He denies side effects.  He's aware of maximum APAP dosing/day.    Allergies:  He continues taking Zyrtec 10mg daily PRN seasonally, which he finds effective.  He denies side effects.    Today's Vitals: There were no vitals taken for this visit.-none taken d/t time.      ASSESSMENT:                Medication Adherence: good, no issues identified    Diabetes:  Needs  improvement. Lantus Rx needs updated sig.     Microalbuminuria:  Stable.    Hyperlipidemia: Stable. Pt is on moderate intensity statin which is indicated based on 2018 ACC/AHA guidelines for lipid management.      Insomnia: Stable.    ED:  Stable.    Arthritis: Stable.    Allergies:  Stable.      PLAN:                  Patient:  1. Continue current medications.    2. Refilled Lantus (8/units day - with priming)    I spent 30 minutes with this patient today. All changes were made via collaborative practice agreement with Curt Schneider. A copy of the visit note was provided to the patient's primary care provider.     Will follow up in 6 months (send Mychart).    The patient was given a summary of these recommendations as an after visit summary.    Anna Su, PharmD  Medication Therapy Management Pharmacist  [phone]

## 2019-12-09 NOTE — PATIENT INSTRUCTIONS
Recommendations from today's MTM visit:                                                      1. Continue current medications.    2. Refilled Lantus (8/units day - with priming)    It was great to speak with you today.  I value your experience and would be very thankful for your time with providing feedback on our clinic survey. You may receive a survey via email or text message in the next few days.     Next MTM visit: 6 months    To schedule another MTM appointment, please call the clinic directly or you may call the MTM scheduling line at [phone] or toll-free at [phone].     My Clinical Pharmacist's contact information:                                                      It was a pleasure talking with you today!  Please feel free to contact me with any questions or concerns you have.      Anna Su, PharmD  Medication Therapy Management Pharmacist  [phone]

## 2020-01-07 ENCOUNTER — DOCUMENTATION ONLY (OUTPATIENT)
Dept: FAMILY MEDICINE | Facility: CLINIC | Age: 70
End: 2020-01-07

## 2020-01-07 DIAGNOSIS — N18.2 CKD (CHRONIC KIDNEY DISEASE) STAGE 2, GFR 60-89 ML/MIN: ICD-10-CM

## 2020-01-07 DIAGNOSIS — R80.9 MICROALBUMINURIA: ICD-10-CM

## 2020-01-07 DIAGNOSIS — Z79.4 TYPE 2 DIABETES MELLITUS WITH DIABETIC POLYNEUROPATHY, WITH LONG-TERM CURRENT USE OF INSULIN (H): ICD-10-CM

## 2020-01-07 DIAGNOSIS — E78.5 HYPERLIPIDEMIA LDL GOAL <100: Primary | ICD-10-CM

## 2020-01-07 DIAGNOSIS — E11.42 TYPE 2 DIABETES MELLITUS WITH DIABETIC POLYNEUROPATHY, WITH LONG-TERM CURRENT USE OF INSULIN (H): ICD-10-CM

## 2020-01-07 NOTE — PROGRESS NOTES
Please place or confirm orders for upcoming lab appointment on 1-13-20.    Thank you  Talya Schuler MA  lab

## 2020-01-13 ENCOUNTER — MYC MEDICAL ADVICE (OUTPATIENT)
Dept: INTERNAL MEDICINE | Facility: CLINIC | Age: 70
End: 2020-01-13

## 2020-01-13 DIAGNOSIS — N18.2 CKD (CHRONIC KIDNEY DISEASE) STAGE 2, GFR 60-89 ML/MIN: ICD-10-CM

## 2020-01-13 DIAGNOSIS — E78.5 HYPERLIPIDEMIA LDL GOAL <100: ICD-10-CM

## 2020-01-13 DIAGNOSIS — E11.42 TYPE 2 DIABETES MELLITUS WITH DIABETIC POLYNEUROPATHY, WITH LONG-TERM CURRENT USE OF INSULIN (H): ICD-10-CM

## 2020-01-13 DIAGNOSIS — Z79.4 TYPE 2 DIABETES MELLITUS WITH DIABETIC POLYNEUROPATHY, WITH LONG-TERM CURRENT USE OF INSULIN (H): ICD-10-CM

## 2020-01-13 DIAGNOSIS — R80.9 MICROALBUMINURIA: ICD-10-CM

## 2020-01-13 LAB
ANION GAP SERPL CALCULATED.3IONS-SCNC: 3 MMOL/L (ref 3–14)
BUN SERPL-MCNC: 20 MG/DL (ref 7–30)
CALCIUM SERPL-MCNC: 9.7 MG/DL (ref 8.5–10.1)
CHLORIDE SERPL-SCNC: 107 MMOL/L (ref 94–109)
CHOLEST SERPL-MCNC: 194 MG/DL
CO2 SERPL-SCNC: 30 MMOL/L (ref 20–32)
CREAT SERPL-MCNC: 1.01 MG/DL (ref 0.66–1.25)
GFR SERPL CREATININE-BSD FRML MDRD: 76 ML/MIN/{1.73_M2}
GLUCOSE SERPL-MCNC: 145 MG/DL (ref 70–99)
HBA1C MFR BLD: 6.7 % (ref 0–5.6)
HDLC SERPL-MCNC: 62 MG/DL
LDLC SERPL CALC-MCNC: 102 MG/DL
NONHDLC SERPL-MCNC: 132 MG/DL
POTASSIUM SERPL-SCNC: 4.4 MMOL/L (ref 3.4–5.3)
SODIUM SERPL-SCNC: 140 MMOL/L (ref 133–144)
TRIGL SERPL-MCNC: 152 MG/DL

## 2020-01-13 PROCEDURE — 83036 HEMOGLOBIN GLYCOSYLATED A1C: CPT | Performed by: INTERNAL MEDICINE

## 2020-01-13 PROCEDURE — 80061 LIPID PANEL: CPT | Performed by: INTERNAL MEDICINE

## 2020-01-13 PROCEDURE — 36415 COLL VENOUS BLD VENIPUNCTURE: CPT | Performed by: INTERNAL MEDICINE

## 2020-01-13 PROCEDURE — 80048 BASIC METABOLIC PNL TOTAL CA: CPT | Performed by: INTERNAL MEDICINE

## 2020-01-20 ENCOUNTER — OFFICE VISIT (OUTPATIENT)
Dept: INTERNAL MEDICINE | Facility: CLINIC | Age: 70
End: 2020-01-20
Payer: COMMERCIAL

## 2020-01-20 VITALS
OXYGEN SATURATION: 99 % | RESPIRATION RATE: 18 BRPM | HEIGHT: 76 IN | SYSTOLIC BLOOD PRESSURE: 116 MMHG | HEART RATE: 80 BPM | TEMPERATURE: 96.9 F | DIASTOLIC BLOOD PRESSURE: 68 MMHG | WEIGHT: 208 LBS | BODY MASS INDEX: 25.33 KG/M2

## 2020-01-20 DIAGNOSIS — Z23 ENCOUNTER FOR IMMUNIZATION: ICD-10-CM

## 2020-01-20 DIAGNOSIS — Z91.89 PNEUMOCOCCAL VACCINATION INDICATED: ICD-10-CM

## 2020-01-20 DIAGNOSIS — N52.9 ERECTILE DYSFUNCTION OF ORGANIC ORIGIN: ICD-10-CM

## 2020-01-20 DIAGNOSIS — E78.5 HYPERLIPIDEMIA LDL GOAL <100: ICD-10-CM

## 2020-01-20 DIAGNOSIS — Z79.4 TYPE 2 DIABETES MELLITUS WITH DIABETIC POLYNEUROPATHY, WITH LONG-TERM CURRENT USE OF INSULIN (H): Primary | ICD-10-CM

## 2020-01-20 DIAGNOSIS — E11.42 TYPE 2 DIABETES MELLITUS WITH DIABETIC POLYNEUROPATHY, WITH LONG-TERM CURRENT USE OF INSULIN (H): Primary | ICD-10-CM

## 2020-01-20 DIAGNOSIS — R80.9 MICROALBUMINURIA: ICD-10-CM

## 2020-01-20 PROCEDURE — 90471 IMMUNIZATION ADMIN: CPT | Performed by: INTERNAL MEDICINE

## 2020-01-20 PROCEDURE — 90732 PPSV23 VACC 2 YRS+ SUBQ/IM: CPT | Performed by: INTERNAL MEDICINE

## 2020-01-20 PROCEDURE — 99214 OFFICE O/P EST MOD 30 MIN: CPT | Mod: 25 | Performed by: INTERNAL MEDICINE

## 2020-01-20 RX ORDER — PRAVASTATIN SODIUM 80 MG/1
TABLET ORAL
Qty: 90 TABLET | Refills: 5 | Status: SHIPPED | OUTPATIENT
Start: 2020-01-20 | End: 2021-01-22

## 2020-01-20 RX ORDER — LISINOPRIL 5 MG/1
TABLET ORAL
Qty: 90 TABLET | Refills: 5 | Status: SHIPPED | OUTPATIENT
Start: 2020-01-20 | End: 2020-08-31

## 2020-01-20 RX ORDER — SILDENAFIL CITRATE 20 MG/1
TABLET ORAL
Qty: 30 TABLET | Refills: 5 | Status: SHIPPED | OUTPATIENT
Start: 2020-01-20 | End: 2020-01-28

## 2020-01-20 ASSESSMENT — MIFFLIN-ST. JEOR: SCORE: 1812.04

## 2020-01-20 NOTE — PATIENT INSTRUCTIONS
If the morning blood glucose averages are around 140 or 150+ then we could make some adjustments with the insulin doses. At this point I don't recommend making any changes to the diabetes treatment.

## 2020-01-20 NOTE — PROGRESS NOTES
Subjective     Felipe Campbell is a 69 year old male who presents to clinic today for the following health issues:    HPI      Type 2 diabetes mellitus with diabetic polyneuropathy, with long-term current use of insulin (H)  Microalbuminuria  Hyperlipidemia LDL goal <100  Erectile dysfunction of organic origin  Pneumococcal vaccination indicated    Diabetes Mellitus Type 2   He continues with regular eye exams. Has some diabetes neuropathy symptoms which is mild and seems stable for him. Using short and long acting insulin. Noticing that his morning readings seem to be a little higher than usual, about a year ago we had dialed back on the long acting insulin due to some occasional hypoglycemic reactions. Has continued with regular exercise and his normal diet, weight is stable for him. His morning sugar average over the last week is 117.     Lab Results   Component Value Date    A1C 6.7 01/13/2020    A1C 6.4 07/09/2019    A1C 6.3 01/17/2019    A1C 6.3 10/01/2018    A1C 6.1 06/27/2018          Patient Active Problem List   Diagnosis     Erectile dysfunction of organic origin     HYPERLIPIDEMIA LDL GOAL <100     Microalbuminuria     Advanced directives, counseling/discussion     Hayfever     Type 2 diabetes mellitus with diabetic polyneuropathy, with long-term current use of insulin (H)     CKD (chronic kidney disease) stage 2, GFR 60-89 ml/min     Posterior vitreous detachment of both eyes     Combined forms of age-related cataract, mild-mod, of both eyes     Past Surgical History:   Procedure Laterality Date     C APPENDECTOMY  07/20/69     COLONOSCOPY WITH CO2 INSUFFLATION N/A 11/8/2016    Procedure: COLONOSCOPY WITH CO2 INSUFFLATION;  Surgeon: Torey Almazan MD;  Location: [facility] OR     HERNIA REPAIR, INGUINAL RT/LT  age 5    left     TUNA         Social History     Tobacco Use     Smoking status: Former Smoker     Packs/day: 0.00     Types: Cigarettes     Last attempt to quit: 1/1/1979     Years since  "quittin.0     Smokeless tobacco: Never Used   Substance Use Topics     Alcohol use: Yes     Comment: occasionally     Family History   Problem Relation Age of Onset     Breast Cancer Mother      Diabetes Father      Diabetes Maternal Grandmother      Congenital Anomalies Sister      Psychotic Disorder Daughter      Glaucoma No family hx of      Macular Degeneration No family hx of          BP Readings from Last 3 Encounters:   20 116/68   19 116/72   19 118/74    Wt Readings from Last 3 Encounters:   20 94.3 kg (208 lb)   19 93.7 kg (206 lb 9.6 oz)   10/09/18 92.3 kg (203 lb 8 oz)         Reviewed and updated as needed this visit by Provider       Review of Systems   ROS COMP: Constitutional, HEENT, cardiovascular, pulmonary, GI, , musculoskeletal, neuro, skin, endocrine and psych systems are negative, except as otherwise noted.    This document serves as a record of the services and decisions personally performed and made by Curt Schneider MD. It was created on his behalf by Kev Lomeli, a trained medical scribe. The creation of this document is based on the provider's statements to the medical scribe.  Kev Lomeli 10:06 AM 2020       Objective    /68   Pulse 80   Temp 96.9  F (36.1  C) (Oral)   Resp 18   Ht 1.918 m (6' 3.5\")   Wt 94.3 kg (208 lb)   SpO2 99%   BMI 25.66 kg/m    Body mass index is 25.66 kg/m .  Physical Exam   GENERAL: healthy, alert and no distress  EYES: Eyes grossly normal to inspection, PERRL and conjunctivae and sclerae normal  SKIN: no suspicious lesions or rashes to visible skin   NEURO: Normal strength and tone, mentation intact and speech normal  PSYCH: mentation appears normal, affect normal/bright      Assessment & Plan     (E11.42,  Z79.4) Type 2 diabetes mellitus with diabetic polyneuropathy, with long-term current use of insulin (H)  (primary encounter diagnosis)  Comment: reviewed blood glucose readings which average " "within goal range. Hemoglobin A1C test a little up but still below 7%. 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 We discussed what to be on the watch for  , update me if significant changes have taken place   Plan: insulin aspart (NOVOLOG FLEXPEN) 100 UNIT/ML         pen, insulin glargine (LANTUS SOLOSTAR) 100         UNIT/ML pen, insulin pen needle (31G X 6 MM)         31G X 6 MM miscellaneous, metFORMIN         (GLUCOPHAGE) 500 MG tablet, insulin pen needle         (B-D U/F) 31G X 5 MM miscellaneous, blood         glucose (ONETOUCH VERIO IQ) test strip        Continue current treatment plan and follow-up for a repeat hemoglobin A1C test in 6 months     (R80.9) Microalbuminuria  Comment: ongoing treatment   Plan: lisinopril (PRINIVIL/ZESTRIL) 5 MG tablet           (E78.5) Hyperlipidemia LDL goal <100  Comment: last labs show borderline control with an LDL of 102.  Plan: pravastatin (PRAVACHOL) 80 MG tablet        Continue current treatment plan    (N52.9) Erectile dysfunction of organic origin  Comment: refill request   Plan: sildenafil (REVATIO) 20 MG tablet            (Z91.89) Pneumococcal vaccination indicated  Comment: given   Plan: Pneumococcal vaccine 23 valent PPSV23          (Pneumovax) [64190]            (Z23) Encounter for immunization   Comment: given   Plan: Pneumococcal vaccine 23 valent PPSV23          (Pneumovax) [34430]            BMI:   Estimated body mass index is 25.66 kg/m  as calculated from the following:    Height as of this encounter: 1.918 m (6' 3.5\").    Weight as of this encounter: 94.3 kg (208 lb).     No follow-ups on file.    The information in this document, created by the medical scribe for me, accurately reflects the services I personally performed and the decisions made by me. I have reviewed and approved this document for accuracy prior to leaving the patient care " area.  January 20, 2020 10:21 AM     Curt Schneider MD  [facility]

## 2020-02-23 ENCOUNTER — HEALTH MAINTENANCE LETTER (OUTPATIENT)
Age: 70
End: 2020-02-23

## 2020-02-28 DIAGNOSIS — Z79.4 TYPE 2 DIABETES MELLITUS WITH DIABETIC POLYNEUROPATHY, WITH LONG-TERM CURRENT USE OF INSULIN (H): ICD-10-CM

## 2020-02-28 DIAGNOSIS — E11.42 TYPE 2 DIABETES MELLITUS WITH DIABETIC POLYNEUROPATHY, WITH LONG-TERM CURRENT USE OF INSULIN (H): ICD-10-CM

## 2020-02-28 NOTE — TELEPHONE ENCOUNTER
Duplicate     Disp Refills Start End LES   insulin aspart (NOVOLOG FLEXPEN) 100 UNIT/ML pen 30 mL 5 1/20/2020  No   Sig: [id number] BEFORE BREAKFAST, 4UNITS LUNCH, 4 UNITS BEFORE DINNER PLUS CORRECTION FACTOR OF 1/80/180 PER MEAL. MAX 25 UNTS/DAY - Not to be refilled until patient calls  Until patient calls   Sent to pharmacy as: insulin aspart (NOVOLOG FLEXPEN) 100 UNIT/ML pen   Class: E-Prescribe   Order: [id number]   E-Prescribing Status: Receipt confirmed by pharmacy (1/20/2020 10:34 AM CST)

## 2020-06-20 ENCOUNTER — ANCILLARY PROCEDURE (OUTPATIENT)
Dept: GENERAL RADIOLOGY | Facility: CLINIC | Age: 70
End: 2020-06-20
Attending: FAMILY MEDICINE
Payer: COMMERCIAL

## 2020-06-20 ENCOUNTER — OFFICE VISIT (OUTPATIENT)
Dept: ORTHOPEDICS | Facility: CLINIC | Age: 70
End: 2020-06-20
Attending: INTERNAL MEDICINE
Payer: COMMERCIAL

## 2020-06-20 VITALS
DIASTOLIC BLOOD PRESSURE: 72 MMHG | WEIGHT: 200 LBS | HEIGHT: 76 IN | SYSTOLIC BLOOD PRESSURE: 116 MMHG | BODY MASS INDEX: 24.36 KG/M2

## 2020-06-20 DIAGNOSIS — M25.512 ACUTE PAIN OF LEFT SHOULDER: ICD-10-CM

## 2020-06-20 DIAGNOSIS — M67.912 TENDINOPATHY OF LEFT ROTATOR CUFF: Primary | ICD-10-CM

## 2020-06-20 PROCEDURE — 20611 DRAIN/INJ JOINT/BURSA W/US: CPT | Mod: LT | Performed by: FAMILY MEDICINE

## 2020-06-20 PROCEDURE — 73030 X-RAY EXAM OF SHOULDER: CPT | Mod: LT

## 2020-06-20 PROCEDURE — 99204 OFFICE O/P NEW MOD 45 MIN: CPT | Mod: 25 | Performed by: FAMILY MEDICINE

## 2020-06-20 RX ORDER — BETAMETHASONE SODIUM PHOSPHATE AND BETAMETHASONE ACETATE 3; 3 MG/ML; MG/ML
6 INJECTION, SUSPENSION INTRA-ARTICULAR; INTRALESIONAL; INTRAMUSCULAR; SOFT TISSUE
Status: SHIPPED | OUTPATIENT
Start: 2020-06-20

## 2020-06-20 RX ORDER — ROPIVACAINE HYDROCHLORIDE 5 MG/ML
3 INJECTION, SOLUTION EPIDURAL; INFILTRATION; PERINEURAL
Status: SHIPPED | OUTPATIENT
Start: 2020-06-20

## 2020-06-20 RX ADMIN — ROPIVACAINE HYDROCHLORIDE 3 ML: 5 INJECTION, SOLUTION EPIDURAL; INFILTRATION; PERINEURAL at 09:52

## 2020-06-20 RX ADMIN — BETAMETHASONE SODIUM PHOSPHATE AND BETAMETHASONE ACETATE 6 MG: 3; 3 INJECTION, SUSPENSION INTRA-ARTICULAR; INTRALESIONAL; INTRAMUSCULAR; SOFT TISSUE at 09:52

## 2020-06-20 ASSESSMENT — MIFFLIN-ST. JEOR: SCORE: 1770.75

## 2020-06-20 NOTE — LETTER
6/20/2020         RE: Felipe Campbell  [address]  [place] [address]        Dear Colleague,    Thank you for referring your patient, Felipe Campbell, to the [facility]. Please see a copy of my visit note below.    ASSESSMENT & PLAN  Felipe was seen today for pain.    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  Pt has TTP over supraspinatus insertion, limitations in active FF/abduction improved with passive with pos impingement signs.  XR showing no arthritis.  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  Counseled patient on nature of condition and treatment options.  Given this plan as below, follow-up as needed.    Treatment: Activities as tolerated  Physical Therapy HEP, if not improved can refer for formal PT  Injection Subacromial steroid injection, improved pain afterwards  Medications  Limited NSAIDs/Tylenol    Concerning signs/sx that would warrant urgent evaluation were discussed.  All questions were answered, patient understands and agrees with plan.      No follow-ups on file.    -----    SUBJECTIVE  Felipe Campbell is a/an 70 year old Right handed male who is seen in consultation at the request of  Curt Schneider M.D. for evaluation of left shoulder pain. The patient is seen by themselves.    Onset: 4+ month(s) ago. Reports insidious onset without acute precipitating event. 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  Has been given exercises by daughter who is a PT.    Location of Pain: left shoulder diffuse   Rating of Pain at worst: 7/10  Rating of Pain Currently:  4/10  Worsened by: use   Better with: topical analgesic, acetaminophen   Treatments tried: Maxifreeze  Associated symptoms: no distal numbness or tingling; denies swelling or warmth  Orthopedic history: NO  Relevant surgical history: NO  Past Medical History:   Diagnosis Date     BPH      Erectile dysfunction of organic origin     dm related     Hayfever     summer and fall     Hyperlipidemia LDL goal <100 10/31/2010     Microalbuminuria 2011     Nonsenile cataract      Social History     Socioeconomic History     Marital status:      Spouse name: Not on file     Number of children: Not on file     Years of education: Not on file     Highest education level: Not on file   Occupational History     Not on file   Social Needs     Financial resource strain: Not on file     Food insecurity     Worry: Not on file     Inability: Not on file     Transportation needs     Medical: Not on file     Non-medical: Not on file   Tobacco Use     Smoking status: Former Smoker     Packs/day: 0.00     Types: Cigarettes     Last attempt to quit: 1979     Years since quittin.4     Smokeless tobacco: Never Used   Substance and Sexual Activity     Alcohol use: Yes     Comment: occasionally     Drug use: No     Sexual activity: Yes     Partners: Female   Lifestyle     Physical activity     Days per week: Not on file     Minutes per session: Not on file     Stress: Not on file   Relationships     Social connections     Talks on phone: Not on file     Gets together: Not on file     Attends Taoism service: Not on file     Active member of club or organization: Not on file     Attends meetings of clubs or organizations: Not on file     Relationship status: Not on file     Intimate partner violence     Fear of current or ex partner: Not on file     Emotionally abused: Not on file     Physically abused: Not on file     Forced sexual activity: Not on file   Other Topics Concern     Parent/sibling w/ CABG, MI or angioplasty  "before 65F 55M? Not Asked   Social History Narrative     Not on file         Patient's past medical, surgical, social, and family histories were reviewed today and no changes are noted.  No family history pertinent to the patient's problem today     REVIEW OF SYSTEMS:  10 point ROS is negative other than symptoms noted above in HPI, Past Medical History or as stated below  Constitutional: NEGATIVE for fever, chills, change in weight  Skin: NEGATIVE for worrisome rashes, moles or lesions  GI/: NEGATIVE for bowel or bladder changes  Neuro: NEGATIVE for weakness, dizziness or paresthesias    OBJECTIVE:  /72   Ht 1.918 m (6' 3.5\")   Wt 90.7 kg (200 lb)   BMI 24.67 kg/m     General: healthy, alert and in no distress  HEENT: no scleral icterus or conjunctival erythema  Skin: no suspicious lesions or rash. No jaundice.  CV: regular rhythm by palpation  Resp: normal respiratory effort without conversational dyspnea   Psych: normal mood and affect  Gait: normal steady gait with appropriate coordination and balance  Neuro: normal light touch sensory exam of the bilateral upper extremities.    MSK:  LEFT SHOULDER  Inspection:    no swelling, bruising, discoloration, or obvious deformity or asymmetry  Palpation:    Tender about the supraspinatus insertion. Remainder of bony and tendinous landmarks are nontender.  Active Range of Motion:     Abduction 1650, FF 1650, , IR L4.    Strength:    Scapular plane abduction 5-/5, painful,  ER 5/5, IR 5/5, biceps 5/5, triceps 5/5  Special Tests:    Positive: Neer's, Mendoza', supraspinatus (empty can) and Speed's    Negative: Bergen's    CERVICAL SPINE  Inspection:    normal cervical lordosis present, rounded shoulders, forward head posture  Palpation:    Nontender.  Range of Motion:     Flexion full    Extension full    Right side bend full    Left side bend full    Right rotation full    Left rotation full  Strength:    Full strength throughout all neck " muscles  Special Tests:    Positive: None    Negative: Spurling's (bilateral)    Independent visualization of the below image:  No results found for this or any previous visit (from the past 24 hour(s)).  XR SHOULDER LT G/E 3 VW 6/20/2020 9:10 AM      HISTORY: Acute pain of left shoulder                                                                      IMPRESSION: Negative exam.     JESSICA ENGLISH MD    I  ordered, visualized and reviewed these images with the patient  No sig arthritis        Large Joint Injection/Arthocentesis: L subacromial bursa    Date/Time: 6/20/2020 9:52 AM  Performed by: Loy Avery MD  Authorized by: Loy Avery MD     Indications:  Pain  Needle Size:  25 G  Guidance: ultrasound    Approach:  Anterolateral  Location:  Shoulder      Site:  L subacromial bursa  Medications:  6 mg betamethasone acet & sod phos 6 (3-3) MG/ML; 3 mL ropivacaine 5 MG/ML  Outcome:  Tolerated well, no immediate complications  Procedure discussed: discussed risks, benefits, and alternatives    Consent Given by:  Patient  Timeout: timeout called immediately prior to procedure    Prep: patient was prepped and draped in usual sterile fashion     4 ML of ropivacaine used in injection    Patient reported significant improvement of pain after left subacromial steroid injection.  Aftercare instructions given to patient.  Plan to follow-up as discussed above.     MD [facility]          MD [facility]      Again, thank you for allowing me to participate in the care of your patient.        Sincerely,        Loy Avery MD

## 2020-06-20 NOTE — PATIENT INSTRUCTIONS
Diagnosis: Left Shoulder pain due to rotator cuff tendinopathy  Image Findings: No significant arthritis  Treatment: Home exercises, subacromial steroid injection today  Job: No restrictions  Medications: Limited tylenol/ibuprofen for pain for 1-2 weeks  Follow-up: As needed if not improved in 4 weeks sooner if worsening    It was great seeing you today!    Loy Avery    [facility] Injection Discharge Instructions    Procedure: Left subacromial steroid injection      You may shower, however avoid swimming, tub baths or hot tubs for 24 hours following your procedure    You may have a mild to moderate increase in pain for several days following the injection.    It may take up to 14 days for the steroid medication to start working although you may feel the effect as early as a few days after the procedure.    You may use ice packs for 10-15 minutes, 3 to 4 times a day at the injection site for comfort    You may use anti-inflammatory medications (such as Ibuprofen or Aleve or Advil) or Tylenol for pain control if necessary    If you were fasting, you may resume your normal diet and medications after the procedure    If you have diabetes, check your blood sugar more frequently than usual as your blood sugar may be higher than normal for 10-14 days following a steroid injection. Contact your doctor who manages your diabetes if your blood sugar is higher than usual      If you experience any of the following, call [facility] @ [phone] or [phone]  -Fever over 100 degree F  -Swelling, bleeding, redness, drainage, warmth at the injection site  - New or worsening pain

## 2020-06-20 NOTE — PROGRESS NOTES
ASSESSMENT & PLAN  Felipe was seen today for pain.    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  Pt has TTP over supraspinatus insertion, limitations in active FF/abduction improved with passive with pos impingement signs.  XR showing no arthritis.  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  Counseled patient on nature of condition and treatment options.  Given this plan as below, follow-up as needed.    Treatment: Activities as tolerated  Physical Therapy HEP, if not improved can refer for formal PT  Injection Subacromial steroid injection, improved pain afterwards  Medications  Limited NSAIDs/Tylenol    Concerning signs/sx that would warrant urgent evaluation were discussed.  All questions were answered, patient understands and agrees with plan.      No follow-ups on file.    -----    SUBJECTIVE  Felipe Campbell is a/an 70 year old Right handed male who is seen in consultation at the request of  Curt Schneider M.D. for evaluation of left shoulder pain. The patient is seen by themselves.    Onset: 4+ month(s) ago. Reports insidious onset without acute precipitating event. 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  Has been given exercises by daughter who is a PT.    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     Hayfever     summer and fall     Hyperlipidemia LDL goal <100 10/31/2010     Microalbuminuria 2011     Nonsenile cataract      Social History     Socioeconomic History     Marital status:      Spouse name: Not on file     Number of children: Not on file     Years of education: Not on file     Highest education level: Not on file   Occupational History     Not on file   Social Needs     Financial resource strain: Not on file     Food insecurity     Worry: Not on file     Inability: Not on file     Transportation needs     Medical: Not on file     Non-medical: Not on file   Tobacco Use     Smoking status: Former Smoker     Packs/day: 0.00     Types: Cigarettes     Last attempt to quit: 1979     Years since quittin.4     Smokeless tobacco: Never Used   Substance and Sexual Activity     Alcohol use: Yes     Comment: occasionally     Drug use: No     Sexual activity: Yes     Partners: Female   Lifestyle     Physical activity     Days per week: Not on file     Minutes per session: Not on file     Stress: Not on file   Relationships     Social connections     Talks on phone: Not on file     Gets together: Not on file     Attends Quaker service: Not on file     Active member of club or organization: Not on file     Attends meetings of clubs or organizations: Not on file     Relationship status: Not on file     Intimate partner violence     Fear of current or ex partner: Not on file     Emotionally abused: Not on file     Physically abused: Not on file     Forced sexual activity: Not on file   Other Topics Concern     Parent/sibling w/ CABG, MI or angioplasty before 65F 55M? Not Asked   Social History Narrative     Not on file         Patient's past medical, surgical, social, and family histories were reviewed today and no changes are noted.  No family history pertinent to the patient's problem today     REVIEW OF SYSTEMS:  10  "point ROS is negative other than symptoms noted above in HPI, Past Medical History or as stated below  Constitutional: NEGATIVE for fever, chills, change in weight  Skin: NEGATIVE for worrisome rashes, moles or lesions  GI/: NEGATIVE for bowel or bladder changes  Neuro: NEGATIVE for weakness, dizziness or paresthesias    OBJECTIVE:  /72   Ht 1.918 m (6' 3.5\")   Wt 90.7 kg (200 lb)   BMI 24.67 kg/m     General: healthy, alert and in no distress  HEENT: no scleral icterus or conjunctival erythema  Skin: no suspicious lesions or rash. No jaundice.  CV: regular rhythm by palpation  Resp: normal respiratory effort without conversational dyspnea   Psych: normal mood and affect  Gait: normal steady gait with appropriate coordination and balance  Neuro: normal light touch sensory exam of the bilateral upper extremities.    MSK:  LEFT SHOULDER  Inspection:    no swelling, bruising, discoloration, or obvious deformity or asymmetry  Palpation:    Tender about the supraspinatus insertion. Remainder of bony and tendinous landmarks are nontender.  Active Range of Motion:     Abduction 1650, FF 1650, , IR L4.    Strength:    Scapular plane abduction 5-/5, painful,  ER 5/5, IR 5/5, biceps 5/5, triceps 5/5  Special Tests:    Positive: Neer's, Mendoza', supraspinatus (empty can) and Speed's    Negative: [place]'s    CERVICAL SPINE  Inspection:    normal cervical lordosis present, rounded shoulders, forward head posture  Palpation:    Nontender.  Range of Motion:     Flexion full    Extension full    Right side bend full    Left side bend full    Right rotation full    Left rotation full  Strength:    Full strength throughout all neck muscles  Special Tests:    Positive: None    Negative: Spurling's (bilateral)    Independent visualization of the below image:  No results found for this or any previous visit (from the past 24 hour(s)).  XR SHOULDER LT G/E 3 VW 6/20/2020 9:10 AM      HISTORY: Acute pain of left " shoulder                                                                      IMPRESSION: Negative exam.     JESSICA ENGLISH MD    I  ordered, visualized and reviewed these images with the patient  No sig arthritis        Large Joint Injection/Arthocentesis: L subacromial bursa    Date/Time: 6/20/2020 9:52 AM  Performed by: Loy Avery MD  Authorized by: Loy Avery MD     Indications:  Pain  Needle Size:  25 G  Guidance: ultrasound    Approach:  Anterolateral  Location:  Shoulder      Site:  L subacromial bursa  Medications:  6 mg betamethasone acet & sod phos 6 (3-3) MG/ML; 3 mL ropivacaine 5 MG/ML  Outcome:  Tolerated well, no immediate complications  Procedure discussed: discussed risks, benefits, and alternatives    Consent Given by:  Patient  Timeout: timeout called immediately prior to procedure    Prep: patient was prepped and draped in usual sterile fashion     4 ML of ropivacaine used in injection    Patient reported significant improvement of pain after left subacromial steroid injection.  Aftercare instructions given to patient.  Plan to follow-up as discussed above.     MD [facility]          MD [facility]

## 2020-07-10 DIAGNOSIS — Z79.4 TYPE 2 DIABETES MELLITUS WITH DIABETIC POLYNEUROPATHY, WITH LONG-TERM CURRENT USE OF INSULIN (H): ICD-10-CM

## 2020-07-10 DIAGNOSIS — E11.42 TYPE 2 DIABETES MELLITUS WITH DIABETIC POLYNEUROPATHY, WITH LONG-TERM CURRENT USE OF INSULIN (H): ICD-10-CM

## 2020-07-12 RX ORDER — FLURBIPROFEN SODIUM 0.3 MG/ML
SOLUTION/ DROPS OPHTHALMIC
Qty: 400 EACH | Refills: 0 | Status: SHIPPED | OUTPATIENT
Start: 2020-07-12 | End: 2020-11-03

## 2020-07-12 NOTE — TELEPHONE ENCOUNTER
Prescription approved per [facility] Refill Protocol.  Keep on file -not picking up now

## 2020-07-20 ENCOUNTER — TELEPHONE (OUTPATIENT)
Dept: FAMILY MEDICINE | Facility: CLINIC | Age: 70
End: 2020-07-20

## 2020-07-20 DIAGNOSIS — E11.42 TYPE 2 DIABETES MELLITUS WITH DIABETIC POLYNEUROPATHY, WITH LONG-TERM CURRENT USE OF INSULIN (H): Primary | ICD-10-CM

## 2020-07-20 DIAGNOSIS — Z79.4 TYPE 2 DIABETES MELLITUS WITH DIABETIC POLYNEUROPATHY, WITH LONG-TERM CURRENT USE OF INSULIN (H): Primary | ICD-10-CM

## 2020-07-20 NOTE — TELEPHONE ENCOUNTER
Reason for call:  Order   Order or referral being requested: lab orders   Reason for request: 6 month check up   Date needed: as soon as possible  Has the patient been seen by the PCP for this problem? YES    Additional comments: Patient requesting above, please call to advise.     Phone number to reach patient:  Home number on file [phone] (home) or Cell number on file:    Telephone Information:   Mobile [phone]       Best Time:  Any     Can we leave a detailed message on this number?  YES    Travel screening: Not Applicable

## 2020-07-21 NOTE — TELEPHONE ENCOUNTER
Pended lab orders per health maintenance. Please review and sign if ok. Please advise if any additional lab work is needed.    Alisson Clemens RN  [facility]

## 2020-07-29 ENCOUNTER — TELEPHONE (OUTPATIENT)
Dept: PHARMACY | Facility: CLINIC | Age: 70
End: 2020-07-29

## 2020-07-29 DIAGNOSIS — Z79.4 TYPE 2 DIABETES MELLITUS WITH DIABETIC POLYNEUROPATHY, WITH LONG-TERM CURRENT USE OF INSULIN (H): ICD-10-CM

## 2020-07-29 DIAGNOSIS — E11.42 TYPE 2 DIABETES MELLITUS WITH DIABETIC POLYNEUROPATHY, WITH LONG-TERM CURRENT USE OF INSULIN (H): ICD-10-CM

## 2020-07-29 LAB
CREAT SERPL-MCNC: 1.08 MG/DL (ref 0.66–1.25)
GFR SERPL CREATININE-BSD FRML MDRD: 70 ML/MIN/{1.73_M2}
HBA1C MFR BLD: 6.3 % (ref 0–5.6)

## 2020-07-29 PROCEDURE — 36415 COLL VENOUS BLD VENIPUNCTURE: CPT | Performed by: INTERNAL MEDICINE

## 2020-07-29 PROCEDURE — 82565 ASSAY OF CREATININE: CPT | Performed by: INTERNAL MEDICINE

## 2020-07-29 PROCEDURE — 83036 HEMOGLOBIN GLYCOSYLATED A1C: CPT | Performed by: INTERNAL MEDICINE

## 2020-07-29 NOTE — TELEPHONE ENCOUNTER
Called to schedule MTM appointment. Scheduled for 8/5/20.    Anna Su, PharmD  Medication Therapy Management Pharmacist  [phone]

## 2020-08-03 ENCOUNTER — OFFICE VISIT (OUTPATIENT)
Dept: INTERNAL MEDICINE | Facility: CLINIC | Age: 70
End: 2020-08-03
Payer: COMMERCIAL

## 2020-08-03 VITALS
OXYGEN SATURATION: 99 % | HEART RATE: 67 BPM | WEIGHT: 204.6 LBS | BODY MASS INDEX: 25.24 KG/M2 | TEMPERATURE: 97.9 F | DIASTOLIC BLOOD PRESSURE: 60 MMHG | SYSTOLIC BLOOD PRESSURE: 104 MMHG | RESPIRATION RATE: 16 BRPM

## 2020-08-03 DIAGNOSIS — Z79.4 TYPE 2 DIABETES MELLITUS WITH DIABETIC POLYNEUROPATHY, WITH LONG-TERM CURRENT USE OF INSULIN (H): Primary | ICD-10-CM

## 2020-08-03 DIAGNOSIS — E11.42 TYPE 2 DIABETES MELLITUS WITH DIABETIC POLYNEUROPATHY, WITH LONG-TERM CURRENT USE OF INSULIN (H): Primary | ICD-10-CM

## 2020-08-03 DIAGNOSIS — E78.5 HYPERLIPIDEMIA LDL GOAL <100: ICD-10-CM

## 2020-08-03 DIAGNOSIS — R80.9 MICROALBUMINURIA: ICD-10-CM

## 2020-08-03 DIAGNOSIS — N52.9 ERECTILE DYSFUNCTION OF ORGANIC ORIGIN: ICD-10-CM

## 2020-08-03 PROCEDURE — 99214 OFFICE O/P EST MOD 30 MIN: CPT | Performed by: INTERNAL MEDICINE

## 2020-08-03 PROCEDURE — 99207 C FOOT EXAM  NO CHARGE: CPT | Performed by: INTERNAL MEDICINE

## 2020-08-03 RX ORDER — INSULIN ASPART 100 [IU]/ML
INJECTION, SOLUTION INTRAVENOUS; SUBCUTANEOUS
Qty: 30 ML | Refills: 5 | Status: SHIPPED | OUTPATIENT
Start: 2020-08-03 | End: 2021-04-26

## 2020-08-03 RX ORDER — SILDENAFIL CITRATE 20 MG/1
TABLET ORAL
Qty: 30 TABLET | Refills: 5 | Status: SHIPPED | OUTPATIENT
Start: 2020-08-03 | End: 2021-08-31

## 2020-08-03 NOTE — PROGRESS NOTES
Subjective     Felipe Campbell is a 70 year old male who presents to clinic today for the following health issues:    Shoulder Pain     History of Present Illness        Diabetes:   He presents for follow up of diabetes.  He is checking home blood glucose four or more times daily. He checks blood glucose before meals and after meals (32 hours ).  Blood glucose is never over 200 and never under 70. He is aware of hypoglycemia symptoms including shakiness, dizziness and weakness. He has no concerns regarding his diabetes at this time.  He is having numbness in feet and burning in feet. The patient has had a diabetic eye exam in the last 12 months. Eye exam performed on within the year. Location of last eye exam [facility] .        He eats 2-3 servings of fruits and vegetables daily.He consumes 0 sweetened beverage(s) daily.He exercises with enough effort to increase his heart rate 60 or more minutes per day.  He exercises with enough effort to increase his heart rate 4 days per week.   He is taking medications regularly.         Patient Active Problem List   Diagnosis     Erectile dysfunction of organic origin     HYPERLIPIDEMIA LDL GOAL <100     Microalbuminuria     Advanced directives, counseling/discussion     Hayfever     Type 2 diabetes mellitus with diabetic polyneuropathy, with long-term current use of insulin (H)     CKD (chronic kidney disease) stage 2, GFR 60-89 ml/min     Posterior vitreous detachment of both eyes     Combined forms of age-related cataract, mild-mod, of both eyes     Past Surgical History:   Procedure Laterality Date     C APPENDECTOMY  07/20/69     COLONOSCOPY WITH CO2 INSUFFLATION N/A 11/8/2016    Procedure: COLONOSCOPY WITH CO2 INSUFFLATION;  Surgeon: Torey Almazan MD;  Location: [facility] OR     HERNIA REPAIR, INGUINAL RT/LT  age 5    left     TUNA         Social History     Tobacco Use     Smoking status: Former Smoker     Packs/day: 0.50     Years: 7.00     Pack years: 3.50      Types: Cigarettes     Start date: 1972     Last attempt to quit: 1979     Years since quittin.6     Smokeless tobacco: Never Used   Substance Use Topics     Alcohol use: Yes     Comment: occasionally     Family History   Problem Relation Age of Onset     Breast Cancer Mother      Diabetes Father      Diabetes Maternal Grandmother      Congenital Anomalies Sister      Psychotic Disorder Daughter      Glaucoma No family hx of      Macular Degeneration No family hx of          Current Outpatient Medications   Medication Sig Dispense Refill     acetaminophen (TYLENOL) 500 MG tablet Take 500-1,000 mg by mouth every 6 hours as needed for mild pain       aspirin 81 MG tablet Take 1 tablet by mouth daily.  3     blood glucose (ONETOUCH VERIO IQ) test strip USE TO TEST FOUR TIMES DAILY OR AS DIRECTED 400 strip 5     cetirizine (ZYRTEC) 10 MG tablet Take 10 mg by mouth daily as needed for allergies       insulin aspart (NOVOLOG FLEXPEN) 100 UNIT/ML pen [id number] BEFORE BREAKFAST, 4UNITS LUNCH, 4 UNITS BEFORE DINNER PLUS CORRECTION FACTOR OF 1/80/180 PER MEAL. MAX 25 UNTS/DAY - Not to be refilled until patient calls  Until patient calls 30 mL 5     insulin glargine (LANTUS SOLOSTAR) 100 UNIT/ML pen Inject 6 units under the skin daily or as directed, prime with 2 units each time (8 units/day). Please fill for 90 days. 15 mL 5     insulin pen needle (31G X 6 MM) 31G X 6 MM miscellaneous Use 4 daily or as directed. Not to be refilled until patient calls  Until patient calls 100 each 5     insulin pen needle (B-D U/F) 31G X 5 MM miscellaneous USE FOUR TIMES DAILY OR AS DIRECTED 400 each 0     lisinopril (PRINIVIL/ZESTRIL) 5 MG tablet TAKE 1 TABLET(5 MG) BY MOUTH DAILY Not to be refilled until patient calls  Until patient calls 90 tablet 5     melatonin 1 MG TABS Take 2 mg by mouth nightly as needed for sleep       Menthol, Topical Analgesic, (BIOFREEZE EX) Apply topically as needed to shoulders/knees.        metFORMIN (GLUCOPHAGE) 500 MG tablet TAKE 2 TABLETS BY MOUTH TWICE DAILY WITH MEALS Not to be refilled until patient calls  Until patient calls 360 tablet 5     pravastatin (PRAVACHOL) 80 MG tablet TAKE 1 TABLET(80 MG) BY MOUTH DAILY Not to be refilled until patient calls  Until patient calls 90 tablet 5     sildenafil (REVATIO) 20 MG tablet Take between 2-4 tablets at a time for planned sexual activity, max 5 pills per day. Never use with nitroglycerin, terazosin doxazosin 30 tablet 5     Allergies   Allergen Reactions     Lipitor [Atorvastatin Calcium]      Rash from lipitor     Hay Fever & [A.R.M.]      Recent Labs   Lab Test 07/29/20  0745 01/13/20  0802 07/09/19  0733 01/17/19  0810  06/27/18  0731 12/12/17  0709  11/30/16  0732 05/27/16  0801  03/14/14  0726   A1C 6.3* 6.7* 6.4* 6.3*   < > 6.1* 6.2*   < >  --  7.2*   < > 8.4*   LDL  --  102*  --  82  --   --  63  --  95 74   < > 119   HDL  --  62  --  63  --   --  69  --  57 54   < > 44   TRIG  --  152*  --  113  --   --  91  --  154* 136   < > 248*   ALT  --   --   --   --   --   --  23  --  26  --   --  40   CR 1.08 1.01  --  1.10  --  0.98 1.10   < > 0.96 1.06   < > 0.91   GFRESTIMATED 70 76  --  69  --  76 67   < > 79 70   < > 85   GFRESTBLACK 81 88  --  80  --  >90 81   < > >90   GFR Calc   85   < > >90   POTASSIUM  --  4.4  --  4.3  --  4.1 4.6   < > 4.4 3.7   < > 4.1   TSH  --   --   --   --   --  2.58  --   --   --  2.39   < >  --     < > = values in this interval not displayed.      BP Readings from Last 3 Encounters:   08/03/20 104/60   06/20/20 116/72   01/20/20 116/68    Wt Readings from Last 3 Encounters:   08/03/20 92.8 kg (204 lb 9.6 oz)   06/20/20 90.7 kg (200 lb)   01/20/20 94.3 kg (208 lb)                    Reviewed and updated as needed this visit by Provider         Review of Systems   Constitutional, HEENT, cardiovascular, pulmonary, gi and gu systems are negative, except as otherwise noted.      Objective    /60    Pulse 67   Temp 97.9  F (36.6  C) (Oral)   Resp 16   Wt 92.8 kg (204 lb 9.6 oz)   SpO2 99%   BMI 25.24 kg/m    Body mass index is 25.24 kg/m .  Physical Exam   GENERAL: healthy, alert and no distress  NECK: no adenopathy, no asymmetry, masses, or scars and thyroid normal to palpation  RESP: lungs clear to auscultation - no rales, rhonchi or wheezes  CV: regular rate and rhythm, normal S1 S2, no S3 or S4, no murmur, click or rub, no peripheral edema and peripheral pulses strong  MS: no gross musculoskeletal defects noted, no edema  NEURO: Normal strength and tone, mentation intact and speech normal  PSYCH: mentation appears normal, affect normal/bright    Diagnostic Test Results:  Labs reviewed in Epic        Assessment & Plan     (E11.42,  Z79.4) Type 2 diabetes mellitus with diabetic polyneuropathy, with long-term current use of insulin (H)  (primary encounter diagnosis)  Comment: continue current plan of care  , diabetes mellitus is well controlled   Plan: FOOT EXAM, insulin aspart (NOVOLOG FLEXPEN) 100        UNIT/ML pen, insulin glargine (LANTUS SOLOSTAR)        100 UNIT/ML pen        Recheck 6 months     (N52.9) Erectile dysfunction of organic origin  Comment: hand written prescription given  Plan: sildenafil (REVATIO) 20 MG tablet            (E78.5) Hyperlipidemia LDL goal <100  Comment: continue current plan of care    Plan: already current with atorvastatin [ lipitor ]  , see pre-clinic laboratory studies done     (R80.9) Microalbuminuria  Comment: as above   Plan:    Blood sugar testing frequency justification:  Uncontrolled diabetes    Return in about 6 months (around 2/3/2021).    Curt Schneider MD  [facility]

## 2020-08-05 ENCOUNTER — ALLIED HEALTH/NURSE VISIT (OUTPATIENT)
Dept: PHARMACY | Facility: CLINIC | Age: 70
End: 2020-08-05
Payer: COMMERCIAL

## 2020-08-05 DIAGNOSIS — J30.1 HAYFEVER: ICD-10-CM

## 2020-08-05 DIAGNOSIS — Z79.4 TYPE 2 DIABETES MELLITUS WITH DIABETIC POLYNEUROPATHY, WITH LONG-TERM CURRENT USE OF INSULIN (H): Primary | ICD-10-CM

## 2020-08-05 DIAGNOSIS — E11.42 TYPE 2 DIABETES MELLITUS WITH DIABETIC POLYNEUROPATHY, WITH LONG-TERM CURRENT USE OF INSULIN (H): Primary | ICD-10-CM

## 2020-08-05 DIAGNOSIS — G47.00 INSOMNIA, UNSPECIFIED TYPE: ICD-10-CM

## 2020-08-05 DIAGNOSIS — E78.5 HYPERLIPIDEMIA LDL GOAL <100: ICD-10-CM

## 2020-08-05 DIAGNOSIS — R80.9 MICROALBUMINURIA: ICD-10-CM

## 2020-08-05 DIAGNOSIS — N52.9 ERECTILE DYSFUNCTION OF ORGANIC ORIGIN: ICD-10-CM

## 2020-08-05 DIAGNOSIS — M17.0 OSTEOARTHRITIS OF BOTH KNEES, UNSPECIFIED OSTEOARTHRITIS TYPE: ICD-10-CM

## 2020-08-05 PROCEDURE — 99605 MTMS BY PHARM NP 15 MIN: CPT | Performed by: PHARMACIST

## 2020-08-05 PROCEDURE — 99607 MTMS BY PHARM ADDL 15 MIN: CPT | Performed by: PHARMACIST

## 2020-08-05 NOTE — Clinical Note
ELLA ZEPEDA note, thanks!    Anna Su, PharmD  Medication Therapy Management Pharmacist  [phone]

## 2020-08-05 NOTE — PROGRESS NOTES
MTM ENCOUNTER  SUBJECTIVE/OBJECTIVE:                           Felipe Campbell is a 70 year old male called for a follow-up visit. He was referred to me from Curt Schneider.  Today's visit is a follow-up MTM visit from 12/9/19.     Patient consented to a telehealth visit: yes  Telemedicine Visit Details  Type of service:  Telephone visit  Start Time: 9:04 AM  End Time: 9:31 AM  Originating Location (pt. Location): Home  Distant Location (provider location):  [facility] MTM  Mode of Communication:  Telephone    Chief Complaint: none, things going well.    Allergies/ADRs: Reviewed in EHR  Tobacco:  reports that he quit smoking about 41 years ago. His smoking use included cigarettes. He started smoking about 47 years ago. He has a 3.50 pack-year smoking history. He has never used smokeless tobacco.  Alcohol: Less than 1 beverages / week  Caffeine: 2 cups/day of coffee  Activity: see DM  PMH: Reviewed in EHR    Medication Adherence/Access:  No concerns  The patient fills medications at [facility]: NO, fills medications at [facility].    Diabetes:    metformin 1000mg BID  Lantus 6 units daily  Novolog 4 units TID with meals (generally 1-2 unit per carb choice plus one additional unit).     Pt is not experiencing side effects.    SMBG: TID, generally before meals, sometimes additional 2 hours post meal.   Ranges (per patient): 100, 2 hours after meal ~150.  Symptoms of low blood sugar? none. Frequency of hypoglycemia? None.  He does have glucagon available if needed, and his wife is familiar with administration.  He was having some overnight hypoglycemia episodes when he was taking 10 units of Lantus, none since dose was reduced.  Recent symptoms of high blood sugar? none  Eye exam: up to date  Foot exam: up to date  Microalbumin is not < 30 mg/g. Pt is taking an ACEi/ARB.  Aspirin: Taking 81mg daily and denies side effects  Exercise:  Outdoors a lot, construction projects, biking, [place].  Lab Results    Component Value Date    A1C 6.3 07/29/2020    A1C 6.7 01/13/2020    A1C 6.4 07/09/2019    A1C 6.3 01/17/2019    A1C 6.3 10/01/2018     Microalbuminuria:  Pt is taking lisinopril 5mg daily.  He denies side effects of therapy.  Lab Results   Component Value Date    UMALCR 40.64 (H) 07/09/2019   ]  BP Readings from Last 3 Encounters:   08/03/20 104/60   06/20/20 116/72   01/20/20 116/68     Hyperlipidemia:   pravastatin 80mg once daily    Pt reports no significant myalgias or other side effects.   Recent Labs   Lab Test 01/13/20  0802 01/17/19  0810  06/15/15  0741  03/14/14  0726   CHOL 194 168   < > 156  --  213*   HDL 62 63   < > 58  --  44   * 82   < > 75   < > 119   TRIG 152* 113   < > 115  --  248*   CHOLHDLRATIO  --   --   --  2.7  --  4.9    < > = values in this interval not displayed.     Insomnia: Has trouble falling asleep. He continues using melatonin as needed, which he finds effective.  He generally takes 2mg when needed and denies side effects.    ED:  He's now taking sildenafil 40-80mg as needed and reports this is effective.  He denies side effects.    Bursitis, left shoulder:  He's been taking APAP 1000mg PRN and biofreeze PRN for knee/shoulder pain (chet shoulders, bass player), and has found this effective.  He denies side effects.  He's aware of maximum APAP dosing/day. He has history of steroid injections, continues with PT exercises.     Allergies:  He continues taking Zyrtec 10mg daily PRN seasonally, which he finds effective.  He denies side effects.    GFR Estimate   Date Value Ref Range Status   07/29/2020 70 >60 mL/min/[1.73_m2] Final     Comment:     Non  GFR Calc  Starting 12/18/2018, serum creatinine based estimated GFR (eGFR) will be   calculated using the Chronic Kidney Disease Epidemiology Collaboration   (CKD-EPI) equation.     01/13/2020 76 >60 mL/min/[1.73_m2] Final     Comment:     Non  GFR Calc  Starting 12/18/2018, serum creatinine based  estimated GFR (eGFR) will be   calculated using the Chronic Kidney Disease Epidemiology Collaboration   (CKD-EPI) equation.     01/17/2019 69 >60 mL/min/[1.73_m2] Final     Comment:     Non  GFR Calc  Starting 12/18/2018, serum creatinine based estimated GFR (eGFR) will be   calculated using the Chronic Kidney Disease Epidemiology Collaboration   (CKD-EPI) equation.       Today's Vitals: There were no vitals taken for this visit.    ASSESSMENT:                              Medication Adherence: good, no issues identified    Diabetes:  Stable. A1C at goal < 7%.    Microalbuminuria:  Stable.    Hyperlipidemia: Stable. Pt is on moderate intensity statin which is indicated based on 2018 ACC/AHA guidelines for lipid management.      Insomnia: Stable.    ED:  Stable.    Bursitis, left shoulder: Stable.    Allergies:  Stable.     PLAN:                            1. Continue current medications.    I spent 27 minutes with this patient today. All changes were made via collaborative practice agreement with Curt Schneider. A copy of the visit note was provided to the patient's primary care provider.    Will follow up in 6 months.    The patient declined a summary of these recommendations.     Anna Su, PharmD  Medication Therapy Management Pharmacist  [phone]

## 2020-08-30 DIAGNOSIS — R80.9 MICROALBUMINURIA: ICD-10-CM

## 2020-08-31 RX ORDER — LISINOPRIL 5 MG/1
TABLET ORAL
Qty: 90 TABLET | Refills: 1 | Status: SHIPPED | OUTPATIENT
Start: 2020-08-31 | End: 2021-06-14

## 2020-10-14 DIAGNOSIS — E11.42 TYPE 2 DIABETES MELLITUS WITH DIABETIC POLYNEUROPATHY, WITH LONG-TERM CURRENT USE OF INSULIN (H): ICD-10-CM

## 2020-10-14 DIAGNOSIS — Z79.4 TYPE 2 DIABETES MELLITUS WITH DIABETIC POLYNEUROPATHY, WITH LONG-TERM CURRENT USE OF INSULIN (H): ICD-10-CM

## 2020-11-02 DIAGNOSIS — E11.42 TYPE 2 DIABETES MELLITUS WITH DIABETIC POLYNEUROPATHY, WITH LONG-TERM CURRENT USE OF INSULIN (H): ICD-10-CM

## 2020-11-02 DIAGNOSIS — Z79.4 TYPE 2 DIABETES MELLITUS WITH DIABETIC POLYNEUROPATHY, WITH LONG-TERM CURRENT USE OF INSULIN (H): ICD-10-CM

## 2020-11-03 RX ORDER — FLURBIPROFEN SODIUM 0.3 MG/ML
SOLUTION/ DROPS OPHTHALMIC
Qty: 400 EACH | Refills: 1 | Status: SHIPPED | OUTPATIENT
Start: 2020-11-03 | End: 2021-06-14

## 2020-12-12 ENCOUNTER — HEALTH MAINTENANCE LETTER (OUTPATIENT)
Age: 70
End: 2020-12-12

## 2021-01-01 ENCOUNTER — TRANSFERRED RECORDS (OUTPATIENT)
Dept: HEALTH INFORMATION MANAGEMENT | Facility: CLINIC | Age: 71
End: 2021-01-01

## 2021-01-01 LAB — RETINOPATHY: NORMAL

## 2021-01-20 ENCOUNTER — OFFICE VISIT (OUTPATIENT)
Dept: OPHTHALMOLOGY | Facility: CLINIC | Age: 71
End: 2021-01-20
Payer: COMMERCIAL

## 2021-01-20 DIAGNOSIS — Z01.01 ENCOUNTER FOR EXAMINATION OF EYES AND VISION WITH ABNORMAL FINDINGS: Primary | ICD-10-CM

## 2021-01-20 DIAGNOSIS — Z79.4 TYPE 2 DIABETES MELLITUS WITH DIABETIC POLYNEUROPATHY, WITH LONG-TERM CURRENT USE OF INSULIN (H): ICD-10-CM

## 2021-01-20 DIAGNOSIS — H52.4 PRESBYOPIA: ICD-10-CM

## 2021-01-20 DIAGNOSIS — E78.5 HYPERLIPIDEMIA LDL GOAL <100: ICD-10-CM

## 2021-01-20 DIAGNOSIS — E11.42 TYPE 2 DIABETES MELLITUS WITH DIABETIC POLYNEUROPATHY, WITH LONG-TERM CURRENT USE OF INSULIN (H): ICD-10-CM

## 2021-01-20 DIAGNOSIS — H43.813 POSTERIOR VITREOUS DETACHMENT OF BOTH EYES: ICD-10-CM

## 2021-01-20 DIAGNOSIS — H25.813 COMBINED FORMS OF AGE-RELATED CATARACT OF BOTH EYES: ICD-10-CM

## 2021-01-20 PROCEDURE — 92015 DETERMINE REFRACTIVE STATE: CPT | Performed by: OPHTHALMOLOGY

## 2021-01-20 PROCEDURE — 92014 COMPRE OPH EXAM EST PT 1/>: CPT | Performed by: OPHTHALMOLOGY

## 2021-01-20 ASSESSMENT — REFRACTION_WEARINGRX
OD_AXIS: 138
OS_ADD: +2.27
OD_ADD: +2.24
SPECS_TYPE: PAL
OD_SPHERE: -6.00
OS_CYLINDER: +0.75
OD_CYLINDER: +0.75
OS_AXIS: 002
OS_SPHERE: -3.75

## 2021-01-20 ASSESSMENT — CONF VISUAL FIELD
OD_NORMAL: 1
OS_NORMAL: 1

## 2021-01-20 ASSESSMENT — REFRACTION_MANIFEST
OD_SPHERE: -6.25
OD_AXIS: 142
OS_CYLINDER: +0.50
OS_AXIS: 010
OS_SPHERE: -5.50
OS_ADD: +3.00
OD_CYLINDER: +1.00
OD_ADD: +3.00

## 2021-01-20 ASSESSMENT — TONOMETRY
IOP_METHOD: APPLANATION
OS_IOP_MMHG: 16
OD_IOP_MMHG: 14

## 2021-01-20 ASSESSMENT — VISUAL ACUITY
METHOD: SNELLEN - LINEAR
OS_CC: 20/70-1
CORRECTION_TYPE: GLASSES
OD_CC: 20/30+3
OD_CC: J2+
OS_CC: J1CLOSE

## 2021-01-20 ASSESSMENT — CUP TO DISC RATIO
OS_RATIO: 0.3
OD_RATIO: 0.3

## 2021-01-20 ASSESSMENT — SLIT LAMP EXAM - LIDS
COMMENTS: NORMAL
COMMENTS: NORMAL

## 2021-01-20 ASSESSMENT — EXTERNAL EXAM - LEFT EYE: OS_EXAM: 2+ BROW PTOSIS, MILD TO MOD BROW

## 2021-01-20 ASSESSMENT — EXTERNAL EXAM - RIGHT EYE: OD_EXAM: 2+ BROW PTOSIS, MILD TO MOD BROW

## 2021-01-20 NOTE — PROGRESS NOTES
Current Eye Medications:  no     Subjective:  DIABETIC EYE EXAM   Pt reports that he has more floaters ,especially in bright light, pt still sees some crescent shaped flashes infrequently. Pt normally wears bifocal contact that work well for him.    Hasn't noted much difficulty with night driving.    Lab Results   Component Value Date    A1C 6.3 07/29/2020    A1C 6.7 01/13/2020    A1C 6.4 07/09/2019    A1C 6.3 01/17/2019    A1C 6.3 10/01/2018        Objective:  See Ophthalmology Exam.       Assessment:  Mild worsening of cataracts; probably visually significant but patient happy with current status.      ICD-10-CM    1. Encounter for examination of eyes and vision with abnormal findings  Z01.01    2. Presbyopia  H52.4 REFRACTIVE STATUS     EYE EXAM (SIMPLE-NONBILLABLE)   3. Type 2 diabetes mellitus with diabetic polyneuropathy, with long-term current use of insulin (H)  E11.42     Z79.4    4. Combined forms of age-related cataract, mod, of both eyes  H25.813    5. Posterior vitreous detachment of both eyes  H43.813          Plan: Glasses Rx given - optional   Call in September 2021 for an appointment in January 2022 for Complete Exam    Dr. Monet [phone]

## 2021-01-20 NOTE — PATIENT INSTRUCTIONS
Glasses Rx given - optional   Call in September 2021 for an appointment in January 2022 for Complete Exam    Dr. Monet [phone]    Patient Education   Diabetes weakens the blood vessels all over the body, including the eyes. Damage to the blood vessels in the eyes can cause swelling or bleeding into part of the eye (called the retina). This is called diabetic retinopathy (KAMERON-tin--puh-thee). If not treated, this disease can cause vision loss or blindness.   Symptoms may include blurred or distorted vision, but many people have no symptoms. It's important to see your eye doctor regularly to check for problems.   Early treatment and good control can help protect your vision. Here are the things you can do to help prevent vision loss:      1. Keep your blood sugar levels under tight control.      2. Bring high blood pressure under control.      3. No smoking.      4. Have yearly dilated eye exams.

## 2021-01-20 NOTE — LETTER
1/20/2021         RE: Felipe Campbell  [address]  [place] [address]        Dear Colleague,    Thank you for referring your patient, Felipe Campbell, to the [facility]. Please see a copy of my visit note below.     Current Eye Medications:  no     Subjective:  DIABETIC EYE EXAM   Pt reports that he has more floaters ,especially in bright light, pt still sees some crescent shaped flashes infrequently. Pt normally wears bifocal contact that work well for him.    Hasn't noted much difficulty with night driving.    Lab Results   Component Value Date    A1C 6.3 07/29/2020    A1C 6.7 01/13/2020    A1C 6.4 07/09/2019    A1C 6.3 01/17/2019    A1C 6.3 10/01/2018        Objective:  See Ophthalmology Exam.       Assessment:  Mild worsening of cataracts; probably visually significant but patient happy with current status.      ICD-10-CM    1. Encounter for examination of eyes and vision with abnormal findings  Z01.01    2. Presbyopia  H52.4 REFRACTIVE STATUS     EYE EXAM (SIMPLE-NONBILLABLE)   3. Type 2 diabetes mellitus with diabetic polyneuropathy, with long-term current use of insulin (H)  E11.42     Z79.4    4. Combined forms of age-related cataract, mod, of both eyes  H25.813    5. Posterior vitreous detachment of both eyes  H43.813          Plan: Glasses Rx given - optional   Call in September 2021 for an appointment in January 2022 for Complete Exam    Dr. Monet [phone]           Again, thank you for allowing me to participate in the care of your patient.        Sincerely,        Sonny Monet MD

## 2021-01-22 RX ORDER — PRAVASTATIN SODIUM 80 MG/1
TABLET ORAL
Qty: 90 TABLET | Refills: 0 | Status: SHIPPED | OUTPATIENT
Start: 2021-01-22 | End: 2021-04-27

## 2021-01-26 ENCOUNTER — MYC MEDICAL ADVICE (OUTPATIENT)
Dept: INTERNAL MEDICINE | Facility: CLINIC | Age: 71
End: 2021-01-26

## 2021-01-26 DIAGNOSIS — Z79.4 TYPE 2 DIABETES MELLITUS WITH DIABETIC POLYNEUROPATHY, WITH LONG-TERM CURRENT USE OF INSULIN (H): ICD-10-CM

## 2021-01-26 DIAGNOSIS — R80.9 MICROALBUMINURIA: ICD-10-CM

## 2021-01-26 DIAGNOSIS — E11.42 TYPE 2 DIABETES MELLITUS WITH DIABETIC POLYNEUROPATHY, WITH LONG-TERM CURRENT USE OF INSULIN (H): ICD-10-CM

## 2021-01-26 DIAGNOSIS — E78.5 HYPERLIPIDEMIA LDL GOAL <100: Primary | ICD-10-CM

## 2021-01-26 DIAGNOSIS — N18.2 CKD (CHRONIC KIDNEY DISEASE) STAGE 2, GFR 60-89 ML/MIN: ICD-10-CM

## 2021-02-15 DIAGNOSIS — Z79.4 TYPE 2 DIABETES MELLITUS WITH DIABETIC POLYNEUROPATHY, WITH LONG-TERM CURRENT USE OF INSULIN (H): ICD-10-CM

## 2021-02-15 DIAGNOSIS — N18.2 CKD (CHRONIC KIDNEY DISEASE) STAGE 2, GFR 60-89 ML/MIN: ICD-10-CM

## 2021-02-15 DIAGNOSIS — E11.42 TYPE 2 DIABETES MELLITUS WITH DIABETIC POLYNEUROPATHY, WITH LONG-TERM CURRENT USE OF INSULIN (H): ICD-10-CM

## 2021-02-15 DIAGNOSIS — E78.5 HYPERLIPIDEMIA LDL GOAL <100: ICD-10-CM

## 2021-02-15 DIAGNOSIS — R80.9 MICROALBUMINURIA: ICD-10-CM

## 2021-02-15 LAB
ANION GAP SERPL CALCULATED.3IONS-SCNC: 4 MMOL/L (ref 3–14)
BUN SERPL-MCNC: 21 MG/DL (ref 7–30)
CALCIUM SERPL-MCNC: 9.4 MG/DL (ref 8.5–10.1)
CHLORIDE SERPL-SCNC: 106 MMOL/L (ref 94–109)
CHOLEST SERPL-MCNC: 161 MG/DL
CO2 SERPL-SCNC: 29 MMOL/L (ref 20–32)
CREAT SERPL-MCNC: 1.13 MG/DL (ref 0.66–1.25)
CREAT UR-MCNC: 74 MG/DL
GFR SERPL CREATININE-BSD FRML MDRD: 66 ML/MIN/{1.73_M2}
GLUCOSE SERPL-MCNC: 143 MG/DL (ref 70–99)
HBA1C MFR BLD: 7.1 % (ref 0–5.6)
HDLC SERPL-MCNC: 62 MG/DL
LDLC SERPL CALC-MCNC: 71 MG/DL
MICROALBUMIN UR-MCNC: 286 MG/L
MICROALBUMIN/CREAT UR: 384.93 MG/G CR (ref 0–17)
NONHDLC SERPL-MCNC: 99 MG/DL
POTASSIUM SERPL-SCNC: 4.2 MMOL/L (ref 3.4–5.3)
SODIUM SERPL-SCNC: 139 MMOL/L (ref 133–144)
TRIGL SERPL-MCNC: 139 MG/DL

## 2021-02-15 PROCEDURE — 83036 HEMOGLOBIN GLYCOSYLATED A1C: CPT | Performed by: INTERNAL MEDICINE

## 2021-02-15 PROCEDURE — 36415 COLL VENOUS BLD VENIPUNCTURE: CPT | Performed by: INTERNAL MEDICINE

## 2021-02-15 PROCEDURE — 80048 BASIC METABOLIC PNL TOTAL CA: CPT | Performed by: INTERNAL MEDICINE

## 2021-02-15 PROCEDURE — 82043 UR ALBUMIN QUANTITATIVE: CPT | Performed by: INTERNAL MEDICINE

## 2021-02-15 PROCEDURE — 80061 LIPID PANEL: CPT | Performed by: INTERNAL MEDICINE

## 2021-02-22 ENCOUNTER — ALLIED HEALTH/NURSE VISIT (OUTPATIENT)
Dept: PHARMACY | Facility: CLINIC | Age: 71
End: 2021-02-22
Payer: COMMERCIAL

## 2021-02-22 ENCOUNTER — OFFICE VISIT (OUTPATIENT)
Dept: INTERNAL MEDICINE | Facility: CLINIC | Age: 71
End: 2021-02-22
Payer: COMMERCIAL

## 2021-02-22 VITALS
OXYGEN SATURATION: 99 % | RESPIRATION RATE: 14 BRPM | SYSTOLIC BLOOD PRESSURE: 129 MMHG | HEART RATE: 77 BPM | HEIGHT: 76 IN | DIASTOLIC BLOOD PRESSURE: 76 MMHG | WEIGHT: 207 LBS | BODY MASS INDEX: 25.21 KG/M2 | TEMPERATURE: 97.8 F

## 2021-02-22 DIAGNOSIS — J30.1 HAYFEVER: ICD-10-CM

## 2021-02-22 DIAGNOSIS — Z12.5 SCREENING FOR PROSTATE CANCER: ICD-10-CM

## 2021-02-22 DIAGNOSIS — Z79.4 TYPE 2 DIABETES MELLITUS WITH DIABETIC POLYNEUROPATHY, WITH LONG-TERM CURRENT USE OF INSULIN (H): Primary | ICD-10-CM

## 2021-02-22 DIAGNOSIS — R33.9 INCOMPLETE BLADDER EMPTYING: ICD-10-CM

## 2021-02-22 DIAGNOSIS — R80.9 MICROALBUMINURIA: ICD-10-CM

## 2021-02-22 DIAGNOSIS — M17.0 OSTEOARTHRITIS OF BOTH KNEES, UNSPECIFIED OSTEOARTHRITIS TYPE: ICD-10-CM

## 2021-02-22 DIAGNOSIS — N52.9 ERECTILE DYSFUNCTION OF ORGANIC ORIGIN: ICD-10-CM

## 2021-02-22 DIAGNOSIS — R30.0 DYSURIA: ICD-10-CM

## 2021-02-22 DIAGNOSIS — G47.00 INSOMNIA, UNSPECIFIED TYPE: ICD-10-CM

## 2021-02-22 DIAGNOSIS — E78.5 HYPERLIPIDEMIA LDL GOAL <100: ICD-10-CM

## 2021-02-22 DIAGNOSIS — R82.90 NONSPECIFIC FINDING ON EXAMINATION OF URINE: ICD-10-CM

## 2021-02-22 DIAGNOSIS — E11.42 TYPE 2 DIABETES MELLITUS WITH DIABETIC POLYNEUROPATHY, WITH LONG-TERM CURRENT USE OF INSULIN (H): Primary | ICD-10-CM

## 2021-02-22 LAB
ALBUMIN UR-MCNC: NEGATIVE MG/DL
APPEARANCE UR: CLEAR
BACTERIA #/AREA URNS HPF: ABNORMAL /HPF
BILIRUB UR QL STRIP: NEGATIVE
COLOR UR AUTO: YELLOW
GLUCOSE UR STRIP-MCNC: 100 MG/DL
HGB UR QL STRIP: ABNORMAL
KETONES UR STRIP-MCNC: NEGATIVE MG/DL
LEUKOCYTE ESTERASE UR QL STRIP: ABNORMAL
NITRATE UR QL: NEGATIVE
NON-SQ EPI CELLS #/AREA URNS LPF: ABNORMAL /LPF
PH UR STRIP: 5.5 PH (ref 5–7)
PSA SERPL-ACNC: 2.9 UG/L (ref 0–4)
RBC #/AREA URNS AUTO: ABNORMAL /HPF
SOURCE: ABNORMAL
SP GR UR STRIP: 1.02 (ref 1–1.03)
UROBILINOGEN UR STRIP-ACNC: 0.2 EU/DL (ref 0.2–1)
WBC #/AREA URNS AUTO: ABNORMAL /HPF

## 2021-02-22 PROCEDURE — G0103 PSA SCREENING: HCPCS | Performed by: INTERNAL MEDICINE

## 2021-02-22 PROCEDURE — 87086 URINE CULTURE/COLONY COUNT: CPT | Performed by: INTERNAL MEDICINE

## 2021-02-22 PROCEDURE — 99605 MTMS BY PHARM NP 15 MIN: CPT | Performed by: PHARMACIST

## 2021-02-22 PROCEDURE — 36415 COLL VENOUS BLD VENIPUNCTURE: CPT | Performed by: INTERNAL MEDICINE

## 2021-02-22 PROCEDURE — 99214 OFFICE O/P EST MOD 30 MIN: CPT | Performed by: INTERNAL MEDICINE

## 2021-02-22 PROCEDURE — 81001 URINALYSIS AUTO W/SCOPE: CPT | Performed by: INTERNAL MEDICINE

## 2021-02-22 ASSESSMENT — MIFFLIN-ST. JEOR: SCORE: 1802.51

## 2021-02-22 NOTE — Clinical Note
ELLA ZEPEDA note, thanks!    Anna Su, PharmD  Medication Therapy Management Pharmacist  [phone]

## 2021-02-22 NOTE — PROGRESS NOTES
Medication Therapy Management (MTM) Encounter    ASSESSMENT:                            Medication Adherence/Access: No issues identified    Diabetes:  A1C not at goal < 7%. Has plans to increase physical activity.     Microalbuminuria:  Stable.    Hyperlipidemia: Stable. Pt is on moderate intensity statin which is indicated based on 2018 ACC/AHA guidelines for lipid management.      Insomnia: Stable.    ED/Bladder infections:  Plan in place.     Bursitis, left shoulder: Stable.    Allergies:  Stable.     PLAN:                            1. Continue current medications.    Follow-up: 6 months    SUBJECTIVE/OBJECTIVE:                          Felipe Campbell is a 70 year old male called for a follow-up visit. He was referred to me from Curt Schneider.  Today's visit is a follow-up MTM visit from 8/5/20. This will serve as an initial visit for 2021.     Reason for visit: med review.    Allergies/ADRs: Reviewed in chart  Tobacco: He reports that he quit smoking about 42 years ago. His smoking use included cigarettes. He started smoking about 48 years ago. He has a 3.50 pack-year smoking history. He has never used smokeless tobacco.  Alcohol: Less than 1 beverages / week  Caffeine: 2 cups/day of coffee  Activity: bicycling in the summer, minimal now  PMH: Reviewed in EHR    Medication Adherence/Access:  No concerns  The patient fills medications at [facility]: NO, fills medications at [facility].    Diabetes:    metformin 1000mg BID  Lantus 6 units daily  Novolog 4 units TID with meals (generally 1-2 unit per carb choice plus one additional unit).     A1C is up, he attributes to being cooped up the last four months. Has plans to increase activity, working with .   Pt is not experiencing side effects.    SMBG: TID, generally before meals, sometimes additional 2 hours post meal.   Ranges (per patient): , 2 hours after meal ~110. Occasionally little higher.  Symptoms of low blood sugar? none. Frequency of  hypoglycemia? None.  He does have glucagon available if needed, and his wife is familiar with administration.  He was having some overnight hypoglycemia episodes when he was taking 10 units of Lantus, none since dose was reduced.  Recent symptoms of high blood sugar? none  Eye exam: up to date  Foot exam: up to date  Microalbumin is not < 30 mg/g. Pt is taking an ACEi/ARB.  Aspirin: Taking 81mg daily and denies side effects  Diet/Exercise:  Sometimes only 2 meals/day.   Lab Results   Component Value Date    A1C 7.1 02/15/2021    A1C 6.3 07/29/2020    A1C 6.7 01/13/2020    A1C 6.4 07/09/2019    A1C 6.3 01/17/2019     Microalbuminuria:    lisinopril 5mg daily    He denies side effects of therapy.  Lab Results   Component Value Date    UMALCR 384.93 (H) 02/15/2021     BP Readings from Last 3 Encounters:   02/22/21 129/76   08/03/20 104/60   06/20/20 116/72     Hyperlipidemia:   pravastatin 80mg once daily    Pt reports no significant myalgias or other side effects.   Recent Labs   Lab Test 02/15/21  0732 01/13/20  0802 06/15/15  0741 06/15/15  0741 03/14/14  0726 03/14/14  0726   CHOL 161 194   < > 156  --  213*   HDL 62 62   < > 58  --  44   LDL 71 102*   < > 75   < > 119   TRIG 139 152*   < > 115  --  248*   CHOLHDLRATIO  --   --   --  2.7  --  4.9    < > = values in this interval not displayed.     Insomnia:   Melatonin 1-2 mg at bedtime PRN.    Has trouble falling asleep on occassion, finds this effective. Denies side effects.     ED/Bladder infections:    sildenafil 40-80mg as needed    He has been having issues with bladder infections lately, labs for this were done this morning, waiting to hear back from Dr. Schneider on this.   Reports sildenafil is effective.  He denies side effects.    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  Finds these effective.  He denies side effects.  Improving strength helps with this too.   He's aware of maximum APAP dosing/day. He has history of steroid injections, continues with PT exercises.     Allergies:    Zyrtec 10mg daily PRN seasonally     which he finds effective.  He denies side effects.    GFR Estimate   Date Value Ref Range Status   02/15/2021 66 >60 mL/min/[1.73_m2] Final     Comment:     Non  GFR Calc  Starting 12/18/2018, serum creatinine based estimated GFR (eGFR) will be   calculated using the Chronic Kidney Disease Epidemiology Collaboration   (CKD-EPI) equation.     07/29/2020 70 >60 mL/min/[1.73_m2] Final     Comment:     Non  GFR Calc  Starting 12/18/2018, serum creatinine based estimated GFR (eGFR) will be   calculated using the Chronic Kidney Disease Epidemiology Collaboration   (CKD-EPI) equation.     01/13/2020 76 >60 mL/min/[1.73_m2] Final     Comment:     Non  GFR Calc  Starting 12/18/2018, serum creatinine based estimated GFR (eGFR) will be   calculated using the Chronic Kidney Disease Epidemiology Collaboration   (CKD-EPI) equation.       Today's Vitals: There were no vitals taken for this visit.  ----------------      I spent 18 minutes with this patient today. All changes were made via collaborative practice agreement with Curt Schneider. A copy of the visit note was provided to the patient's primary care provider.    The patient was sent via [employer] a summary of these recommendations.     Anna Su, PharmD  Medication Therapy Management Pharmacist  [phone]    Telemedicine Visit Details  Type of service:  Telephone visit  Start Time: 1:02 PM  End Time: 1:20 PM  Originating Location (patient location): Home  Distant Location (provider location):  [facility] MTM      Medication Therapy Recommendations  No medication therapy recommendations to display

## 2021-02-22 NOTE — LETTER
"        Date: 2021    Felipe Campbell  [address] [place] [address]    Dear Mr. Campbell,    Thank you for talking with me on 21 about your health and medications. Medicare s MTM (Medication Therapy Management) program helps you understand your medications and use them safely.      This letter includes an action plan (Medication Action Plan) and medication list (Personal Medication List). The action plan has steps you should take to help you get the best results from your medications. The medication list will help you keep track of your medications and how to use them the right way.       Have your action plan and medication list with you when you talk with your doctors, pharmacists, and other healthcare providers in your care team.     Ask your doctors, pharmacists, and other healthcare providers to update the action plan and medication list at every visit.     Take your medication list with you if you go to the hospital or emergency room.     Give a copy of the action plan and medication list to your family or caregivers.     If you want to talk about this letter or any of the papers with it, please call   [phone].We look forward to working with you, your doctors, and other healthcare providers to help you stay healthy through the [facility] MTM program.    Sincerely,  Moriah Su [facility]    Enclosed: Medication Action Plan and Personal Medication List    MEDICATION ACTION PLAN FOR Felipe Campbell,  1950     This action plan will help you get the best results from your medications if you:   1. Read \"What we talked about.\"   2. Take the steps listed in the \"What I need to do\" boxes.   3. Fill in \"What I did and when I did it.\"   4. Fill in \"My follow-up plan\" and \"Questions I want to ask.\"     Have this action plan with you when you talk with your doctors, pharmacists, and other healthcare providers in your care team. Share this with your family or caregivers too.  DATE PREPARED: " 2021    My follow-up plan:                 Questions I want to ask:              If you have any questions about your action plan, call Moriah Su [facility], Phone: [phone] , Monday-Friday 8-4:30pm.           PERSONAL MEDICATION LIST FOR DANTE Thakur 1950     This medication list was made for you after we talked. We also used information from your doctor's chart.      Use blank rows to add new medications. Then fill in the dates you started using them.    Cross out medications when you no longer use them. Then write the date and why you stopped using them.    Ask your doctors, pharmacists, and other healthcare providers to update this list at every visit. Keep this list up-to-date with:       Prescription medications    Over the counter drugs     Herbals    Vitamins    Minerals      If you go to the hospital or emergency room, take this list with you. Share this with your family or caregivers too.     DATE PREPARED: 2021  Allergies or side effects: Lipitor [atorvastatin calcium] and Hay fever & [a.r.m.]     Medication:  ACETAMINOPHEN 500 MG PO TABS      How I use it:  Take 500-1,000 mg by mouth every 6 hours as needed for mild pain      Why I use it:  pain    Prescriber:  Patient Reported      Date I started using it:       Date I stopped using it:         Why I stopped using it:            Medication:  ASPIRIN 81 MG PO TABS      How I use it:  Take 1 tablet by mouth daily.      Why I use it: Type 2 diabetes, HbA1c goal < 7% (H)    Prescriber:  Curt Schneider MD      Date I started using it:       Date I stopped using it:         Why I stopped using it:            Medication:  BD PEN NEEDLE MINI U/F 31G X 5 MM MISC      How I use it:  USE FOUR TIMES DAILY OR AS DIRECTED      Why I use it: Type 2 diabetes mellitus with diabetic polyneuropathy, with long-term current use of insulin (H)    Prescriber:  Curt Schneider MD      Date I started using it:       Date I stopped using it:         Why I  stopped using it:               Medication:  BIOFREEZE EX      How I use it:  Apply topically as needed to shoulders/knees.      Why I use it:  pain    Prescriber:  Patient Reported      Date I started using it:       Date I stopped using it:         Why I stopped using it:            Medication:  CETIRIZINE HCL 10 MG PO TABS      How I use it:  Take 10 mg by mouth daily as needed for allergies      Why I use it:  allergies    Prescriber:  Patient Reported      Date I started using it:       Date I stopped using it:         Why I stopped using it:            Medication:  GLUCOSE BLOOD VI STRP      How I use it:  USE TO TEST FOUR TIMES DAILY OR AS DIRECTED      Why I use it: Type 2 diabetes mellitus with diabetic polyneuropathy, with long-term current use of insulin (H)    Prescriber:  Curt Schneider MD      Date I started using it:       Date I stopped using it:         Why I stopped using it:            Medication:  INSULIN GLARGINE  UNIT/ML SC SOPN      How I use it:  Inject 6 units under the skin daily or as directed, prime with 2 units each time (8 units/day). Please fill for 90 days.      Why I use it: Type 2 diabetes mellitus with diabetic polyneuropathy, with long-term current use of insulin (H)    Prescriber:  Curt Schneider MD      Date I started using it:       Date I stopped using it:         Why I stopped using it:            Medication:  LISINOPRIL 5 MG PO TABS      How I use it:  TAKE 1 TABLET BY MOUTH DAILY      Why I use it: Microalbuminuria    Prescriber:  Curt Schneider MD      Date I started using it:       Date I stopped using it:         Why I stopped using it:            Medication:  MELATONIN 1 MG PO TABS      How I use it:  Take 2 mg by mouth nightly as needed for sleep      Why I use it:  sleep    Prescriber:  Patient Reported      Date I started using it:       Date I stopped using it:         Why I stopped using it:            Medication:  METFORMIN  MG PO TABS      How I use  it:  TAKE 2 TABLETS BY MOUTH TWICE DAILY WITH MEALS      Why I use it: Type 2 diabetes mellitus with diabetic polyneuropathy, with long-term current use of insulin (H)    Prescriber:  Curt Schneider MD      Date I started using it:       Date I stopped using it:         Why I stopped using it:            Medication:  NOVOLOG FLEXPEN 100 UNIT/ML SC SOPN      How I use it:  [id number] BEFORE BREAKFAST, 4UNITS LUNCH, 4 UNITS BEFORE DINNER PLUS CORRECTION FACTOR OF 1/80/180 PER MEAL. MAX 25 UNTS/DAY - Not to be refilled until patient calls  Until patient calls      Why I use it: Type 2 diabetes mellitus with diabetic polyneuropathy, with long-term current use of insulin (H)    Prescriber:  Curt Schneider MD      Date I started using it:       Date I stopped using it:         Why I stopped using it:            Medication:  PRAVASTATIN SODIUM 80 MG PO TABS      How I use it:  +++NEED FASTING LABS+++TAKE 1 TABLET BY MOUTH DAILY      Why I use it: Hyperlipidemia LDL goal <100    Prescriber:  Curt Schneider MD      Date I started using it:       Date I stopped using it:         Why I stopped using it:            Medication:  SILDENAFIL CITRATE 20 MG PO TABS      How I use it:  Take between 2-4 tablets at a time for planned sexual activity, max 5 pills per day. Patient will be paying out of pocket for this medication      Why I use it: Erectile dysfunction of organic origin    Prescriber:  Curt Schneider MD      Date I started using it:       Date I stopped using it:         Why I stopped using it:            Medication:         How I use it:         Why I use it:      Prescriber:         Date I started using it:       Date I stopped using it:         Why I stopped using it:            Medication:         How I use it:         Why I use it:      Prescriber:         Date I started using it:       Date I stopped using it:         Why I stopped using it:            Medication:         How I use it:         Why I use it:       Prescriber:         Date I started using it:       Date I stopped using it:         Why I stopped using it:              Other Information:     If you have any questions about your medication list, call Moriah Su [facility], Phone: [phone] , Monday-Friday 8-4:30pm.    According to the Paperwork Reduction Act of 1995, no persons are required to respond to a collection of information unless it displays a valid OMB control number. The valid OMB number for this information collection is [id number]. The time required to complete this information collection is estimated to average 40 minutes per response, including the time to review instructions, searching existing data resources, gather the data needed, and complete and review the information collection. If you have any comments concerning the accuracy of the time estimate(s) or suggestions for improving this form, please write to: CMS, Attn: AMOR Reports Clearance Officer, [address] [place] [phone].

## 2021-02-22 NOTE — PROGRESS NOTES
Assessment & Plan     Type 2 diabetes mellitus with diabetic polyneuropathy, with long-term current use of insulin (H)  Pre-clinic laboratory studies done are reviewed. There's been a slight upwards bump up with the Albumin random urine quantitative, this is in the presence of well controlled hypertension and diabetes mellitus. It's true his hemoglobin a1c  [ diabetes test ] climbed up from the 6 range to 7.1% and it is clear to me that patient needs to get more physical activity . All in all things look ok and recheck in 6 months. I introduced no specific changes to current plan of care     Microalbuminuria  As detailed above     Hyperlipidemia LDL goal <100  Continue current plan of care      Incomplete bladder emptying  Patient discussed some new symptoms with me. He's noted gradually over last few months that he's getting some sense of incomplete bladder emptying and some mild dysuria symptoms. We agreed to check prostate specific antigen and urine analysis and urine culture today. Follow up as indicated on results , possibly needs urological consultation     - UA with Microscopic reflex to Culture    Dysuria  As above   - UA with Microscopic reflex to Culture    Screening for prostate cancer  As above   - PSA, screen    Nonspecific finding on examination of urine    - Urine Culture Aerobic Bacterial    Review of the result(s) of each unique test - recent labs done Pre-clinic for this appt today  24 minutes spent on the date of the encounter doing chart review, history and exam, documentation and further activities as noted above       Blood sugar testing frequency justification:  Risk of hypoglycemia with medication(s)    Return in about 6 months (around 8/22/2021).    Curt Schneider MD  [facility] ANA ROSA Jaquez is a 70 year old who presents for the following health issues   Encounter Diagnoses   Name Primary?     Type 2 diabetes mellitus with diabetic polyneuropathy, with  long-term current use of insulin (H) Yes     Microalbuminuria      Hyperlipidemia LDL goal <100      Incomplete bladder emptying      Dysuria      Screening for prostate cancer      Nonspecific finding on examination of urine        History of Present Illness       Diabetes:   He presents for follow up of diabetes.  He is checking home blood glucose four or more times daily. He checks blood glucose before meals.  Blood glucose is never over 200 and never under 70. He is aware of hypoglycemia symptoms including shakiness, dizziness and weakness. He has no concerns regarding his diabetes at this time.  He is having numbness in feet and burning in feet. The patient has had a diabetic eye exam in the last 12 months. Eye exam performed on January 2021. Location of last eye exam [facility] .        He eats 2-3 servings of fruits and vegetables daily.He consumes 0 sweetened beverage(s) daily.He exercises with enough effort to increase his heart rate 60 or more minutes per day.  He exercises with enough effort to increase his heart rate 3 or less days per week.   He is taking medications regularly.     110 blood glucose when fasting  135 to 140 other times.  He's taking Metformin ( Glucophage) 1 gram 2 times a day  Lantus (Insulin Glargine)  - 6 units bedtime   Novolog (ultra-short acting insulin aspart) - checks blood glucose before and after meal. 4 units = 1 more units for every 16 grams of carbohydrate     Lab Results   Component Value Date    A1C 7.1 02/15/2021    A1C 6.3 07/29/2020    A1C 6.7 01/13/2020    A1C 6.4 07/09/2019    A1C 6.3 01/17/2019     Patient blames the slightly higher hemoglobin a1c  [ diabetes test ] on the pandemic    He does see himself moving into more physical activity      Patient says he feels like his urinary sphincter is inflamed, his symptoms are noted while urinating and he has to try to relax to let the urine flow. Senses some incomplete bladder emptying and increased  "urinary frequency , and just a little bit of dysuria with essentially all urination.    Chronic nocturia times 1 but now it's 3 times per night so this does speak to some type of bladder inflammation or possibly urethritis or prostate issues     He did once see a urologist before and had a Transurethral needle ablation (TUNA) of the prostate but this didn't seem to really help much.    Review of Systems   Constitutional, HEENT, cardiovascular, pulmonary, gi and gu systems are negative, except as otherwise noted.      Objective    /76   Pulse 77   Temp 97.8  F (36.6  C) (Oral)   Resp 14   Ht 1.918 m (6' 3.5\")   Wt 93.9 kg (207 lb)   SpO2 99%   BMI 25.53 kg/m    Body mass index is 25.53 kg/m .  Physical Exam   GENERAL: healthy, alert and no distress  NECK: no adenopathy, no asymmetry, masses, or scars and thyroid normal to palpation  RESP: lungs clear to auscultation - no rales, rhonchi or wheezes  CV: regular rate and rhythm, normal S1 S2, no S3 or S4, no murmur, click or rub, no peripheral edema and peripheral pulses strong  MS: no gross musculoskeletal defects noted, no edema    Orders Placed This Encounter   Procedures     PSA, screen     UA with Microscopic reflex to Culture         I spent a total of 24 minutes face-to-face with Felipe Campbell during today's office visit.  Over 50% of this time was spent counseling the patient and/or coordinating care regarding   Encounter Diagnoses   Name Primary?     Type 2 diabetes mellitus with diabetic polyneuropathy, with long-term current use of insulin (H) Yes     Microalbuminuria      Hyperlipidemia LDL goal <100      Incomplete bladder emptying      Dysuria      Screening for prostate cancer      Nonspecific finding on examination of urine     .  See note for details.        "

## 2021-02-23 ENCOUNTER — MYC MEDICAL ADVICE (OUTPATIENT)
Dept: INTERNAL MEDICINE | Facility: CLINIC | Age: 71
End: 2021-02-23

## 2021-02-23 LAB
BACTERIA SPEC CULT: NORMAL
Lab: NORMAL
SPECIMEN SOURCE: NORMAL

## 2021-02-24 ENCOUNTER — MYC MEDICAL ADVICE (OUTPATIENT)
Dept: INTERNAL MEDICINE | Facility: CLINIC | Age: 71
End: 2021-02-24

## 2021-02-24 DIAGNOSIS — R30.0 DYSURIA: ICD-10-CM

## 2021-02-24 DIAGNOSIS — R33.9 INCOMPLETE BLADDER EMPTYING: Primary | ICD-10-CM

## 2021-03-22 ENCOUNTER — OFFICE VISIT (OUTPATIENT)
Dept: UROLOGY | Facility: CLINIC | Age: 71
End: 2021-03-22
Attending: INTERNAL MEDICINE
Payer: COMMERCIAL

## 2021-03-22 ENCOUNTER — TELEPHONE (OUTPATIENT)
Dept: UROLOGY | Facility: CLINIC | Age: 71
End: 2021-03-22

## 2021-03-22 VITALS
SYSTOLIC BLOOD PRESSURE: 134 MMHG | OXYGEN SATURATION: 98 % | DIASTOLIC BLOOD PRESSURE: 88 MMHG | TEMPERATURE: 96.1 F | HEART RATE: 82 BPM

## 2021-03-22 DIAGNOSIS — N40.1 BENIGN PROSTATIC HYPERPLASIA WITH LOWER URINARY TRACT SYMPTOMS, SYMPTOM DETAILS UNSPECIFIED: ICD-10-CM

## 2021-03-22 DIAGNOSIS — R31.29 MICROHEMATURIA: Primary | ICD-10-CM

## 2021-03-22 DIAGNOSIS — R30.0 DYSURIA: ICD-10-CM

## 2021-03-22 LAB
ALBUMIN UR-MCNC: 100 MG/DL
APPEARANCE UR: ABNORMAL
BILIRUB UR QL STRIP: NEGATIVE
COLOR UR AUTO: ABNORMAL
GLUCOSE UR STRIP-MCNC: 100 MG/DL
HGB UR QL STRIP: ABNORMAL
KETONES UR STRIP-MCNC: NEGATIVE MG/DL
LEUKOCYTE ESTERASE UR QL STRIP: ABNORMAL
NITRATE UR QL: NEGATIVE
PH UR STRIP: 5.5 PH (ref 5–7)
RBC #/AREA URNS AUTO: ABNORMAL /HPF
SOURCE: ABNORMAL
SP GR UR STRIP: 1.02 (ref 1–1.03)
UROBILINOGEN UR STRIP-ACNC: 0.2 EU/DL (ref 0.2–1)
WBC #/AREA URNS AUTO: ABNORMAL /HPF

## 2021-03-22 PROCEDURE — 51798 US URINE CAPACITY MEASURE: CPT | Performed by: UROLOGY

## 2021-03-22 PROCEDURE — 81001 URINALYSIS AUTO W/SCOPE: CPT | Performed by: UROLOGY

## 2021-03-22 PROCEDURE — 99204 OFFICE O/P NEW MOD 45 MIN: CPT | Mod: 25 | Performed by: UROLOGY

## 2021-03-22 RX ORDER — TAMSULOSIN HYDROCHLORIDE 0.4 MG/1
0.4 CAPSULE ORAL AT BEDTIME
Qty: 30 CAPSULE | Refills: 1 | Status: SHIPPED | OUTPATIENT
Start: 2021-03-22 | End: 2021-05-03

## 2021-03-22 RX ORDER — CIPROFLOXACIN 500 MG/1
500 TABLET, FILM COATED ORAL 2 TIMES DAILY
Qty: 42 TABLET | Refills: 0 | Status: SHIPPED | OUTPATIENT
Start: 2021-03-22 | End: 2021-04-12

## 2021-03-22 NOTE — TELEPHONE ENCOUNTER
----- Message from Noah Steiner MD sent at 3/22/2021 10:41 AM CDT -----  Please CC patient of results.  Need CT urogram also and cysto eventually

## 2021-03-22 NOTE — PROGRESS NOTES
S: Felipe Campbell is a pleasant  70 year old male who was requested to be seen by  Curt Schneider for a consult with regard to patient's urinary complaints.  Patient complains of Hesitancy in starting urinary stream, Sensation of incomplete emptying, Strain to Urinate, Nocturia x 4, Urgency, Frequency, Intermittency and slow urinary stream.  He has history of elevated PSA.  Symptoms have been on going for a couple of month(s).  Seems to be worsened over time.  His recent PSA was found to be   PSA   Date Value Ref Range Status   02/22/2021 2.90 0 - 4 ug/L Final     Comment:     Assay Method:  Chemiluminescence using Siemens Vista analyzer   .  His AUA Symptom Score:  26.  His QOL score:  4.  Recent UA showed  wbc/hpf with negative urine culture.    Current Outpatient Medications   Medication Sig Dispense Refill     ciprofloxacin (CIPRO) 500 MG tablet Take 1 tablet (500 mg) by mouth 2 times daily for 21 days 42 tablet 0     tamsulosin (FLOMAX) 0.4 MG capsule Take 1 capsule (0.4 mg) by mouth At Bedtime 30 capsule 1     acetaminophen (TYLENOL) 500 MG tablet Take 500-1,000 mg by mouth every 6 hours as needed for mild pain       aspirin 81 MG tablet Take 1 tablet by mouth daily.  3     B-D U/F insulin pen needle USE FOUR TIMES DAILY OR AS DIRECTED 400 each 1     blood glucose (ONETOUCH VERIO IQ) test strip USE TO TEST FOUR TIMES DAILY OR AS DIRECTED 400 strip 5     cetirizine (ZYRTEC) 10 MG tablet Take 10 mg by mouth daily as needed for allergies       insulin aspart (NOVOLOG FLEXPEN) 100 UNIT/ML pen [id number] BEFORE BREAKFAST, 4UNITS LUNCH, 4 UNITS BEFORE DINNER PLUS CORRECTION FACTOR OF 1/80/180 PER MEAL. MAX 25 UNTS/DAY - Not to be refilled until patient calls  Until patient calls 30 mL 5     insulin glargine (LANTUS SOLOSTAR) 100 UNIT/ML pen Inject 6 units under the skin daily or as directed, prime with 2 units each time (8 units/day). Please fill for 90 days. 15 mL 5     lisinopril (ZESTRIL) 5 MG tablet  TAKE 1 TABLET BY MOUTH DAILY 90 tablet 1     melatonin 1 MG TABS Take 2 mg by mouth nightly as needed for sleep       Menthol, Topical Analgesic, (BIOFREEZE EX) Apply topically as needed to shoulders/knees.       metFORMIN (GLUCOPHAGE) 500 MG tablet TAKE 2 TABLETS BY MOUTH TWICE DAILY WITH MEALS 360 tablet 0     pravastatin (PRAVACHOL) 80 MG tablet +++NEED FASTING LABS+++TAKE 1 TABLET BY MOUTH DAILY 90 tablet 0     sildenafil (REVATIO) 20 MG tablet Take between 2-4 tablets at a time for planned sexual activity, max 5 pills per day. 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     Hayfever     summer and fall     Hyperlipidemia LDL goal <100 10/31/2010     Microalbuminuria 6/2/2011     Nonsenile cataract      Past Surgical History:   Procedure Laterality Date     C APPENDECTOMY  07/20/69     COLONOSCOPY WITH CO2 INSUFFLATION N/A 11/8/2016    Procedure: COLONOSCOPY WITH CO2 INSUFFLATION;  Surgeon: Torey Almazan MD;  Location: [facility] OR     HERNIA REPAIR, INGUINAL RT/LT  age 5    left     TUNA        Family History   Problem Relation Age of Onset     Breast Cancer Mother      Diabetes Father      Diabetes Maternal Grandmother      Congenital Anomalies Sister      Psychotic Disorder Daughter      Glaucoma No family hx of      Macular Degeneration No family hx of      He does not have a family history of prostate cancer.  Social History     Socioeconomic History     Marital status:      Spouse name: None     Number of children: None     Years of education: None     Highest education level: None   Occupational History     None   Social Needs     Financial resource strain: None     Food insecurity     Worry: None     Inability: None      Transportation needs     Medical: None     Non-medical: None   Tobacco Use     Smoking status: Former Smoker     Packs/day: 0.50     Years: 7.00     Pack years: 3.50     Types: Cigarettes     Start date: 1972     Quit date: 1979     Years since quittin.2     Smokeless tobacco: Never Used   Substance and Sexual Activity     Alcohol use: Yes     Comment: occasionally     Drug use: No     Sexual activity: Yes     Partners: Female     Birth control/protection: None   Lifestyle     Physical activity     Days per week: None     Minutes per session: None     Stress: None   Relationships     Social connections     Talks on phone: None     Gets together: None     Attends Yazdanism service: None     Active member of club or organization: None     Attends meetings of clubs or organizations: None     Relationship status: None     Intimate partner violence     Fear of current or ex partner: None     Emotionally abused: None     Physically abused: None     Forced sexual activity: None   Other Topics Concern     Parent/sibling w/ CABG, MI or angioplasty before 65F 55M? Not Asked   Social History Narrative     None        REVIEW OF SYSTEMS  =================  C: NEGATIVE for fever, chills, change in weight  I: NEGATIVE for worrisome rashes, moles or lesions  E/M: NEGATIVE for ear, mouth and throat problems  R: NEGATIVE for significant cough or SHORTNESS OF BREATH  CV:  NEGATIVE for chest pain, palpitations or peripheral edema  GI: NEGATIVE for nausea, abdominal pain, heartburn, or change in bowel habits  NEURO: NEGATIVE numbness/weakness  : see HPI  PSYCH: NEGATIVE depression/anxiety  LYmph: no new enlarged lymph nodes  Ortho: no new trauma/movements           O: Exam:/88 (BP Location: Right arm, Patient Position: Chair, Cuff Size: Adult Large)   Pulse 82   Temp 96.1  F (35.6  C) (Tympanic)   SpO2 98%    Constitutional: healthy, alert and no distress  Cardiovascular: negative, PMI normal.   Respiratory:  negative, no evidence of respiratory distress  Gastrointestinal: Abdomen soft, non-tender. BS normal. No masses, organomegaly  : penis no discharge. Testis no masses.  No scrotal skin lesion.  Prostate 40 gm plus firmer on the right.  Musculoskeletal: extremities normal- no gross deformities noted, gait normal and normal muscle tone  Skin: no suspicious lesions or rashes  Neurologic: Alert and oriented  Psychiatric: mentation appears normal. and affect normal/bright  Hematologic/Lymphatic/Immunologic: normal ant/post cervical, axillary, supraclavicular and inguinal nodes    Assessment/Plan:   (N40.1) Benign prostatic hyperplasia with lower urinary tract symptoms, symptom details unspecified  Comment:  ml  Plan: start flomax           Recheck pvr in 4-6 weeks    (R30.0) Dysuria  Comment:  Suspect prostatitis.  Plan: start cipro 500 gm po bid for 3 weeks          Recheck ua in 4-6 weeks.

## 2021-03-22 NOTE — TELEPHONE ENCOUNTER
Per MD results note.  Left message for patient to call [phone] to advised needs CT scan prior to his 5/3 appointment. Order in Chart. Info entered into AVS.  Possible cystoscopy on 5/3 per Dr. Steiner.  Alisson Rodarte, [facility]

## 2021-03-30 ENCOUNTER — ANCILLARY PROCEDURE (OUTPATIENT)
Dept: CT IMAGING | Facility: CLINIC | Age: 71
End: 2021-03-30
Attending: UROLOGY
Payer: COMMERCIAL

## 2021-03-30 DIAGNOSIS — R31.29 MICROSCOPIC HEMATURIA: Primary | ICD-10-CM

## 2021-03-30 DIAGNOSIS — R31.29 MICROHEMATURIA: ICD-10-CM

## 2021-03-30 LAB
CREAT BLD-MCNC: 0.9 MG/DL (ref 0.5–1.2)
GFR SERPL CREATININE-BSD FRML MDRD: 87 ML/MIN/{1.73_M2}
GFRB: 84

## 2021-03-30 PROCEDURE — 74178 CT ABD&PLV WO CNTR FLWD CNTR: CPT | Mod: TC | Performed by: RADIOLOGY

## 2021-03-30 RX ORDER — IOPAMIDOL 755 MG/ML
100 INJECTION, SOLUTION INTRAVASCULAR ONCE
Status: COMPLETED | OUTPATIENT
Start: 2021-03-30 | End: 2021-03-30

## 2021-03-30 RX ADMIN — IOPAMIDOL 100 ML: 755 INJECTION, SOLUTION INTRAVASCULAR at 09:22

## 2021-04-11 ENCOUNTER — HEALTH MAINTENANCE LETTER (OUTPATIENT)
Age: 71
End: 2021-04-11

## 2021-04-14 DIAGNOSIS — Z79.4 TYPE 2 DIABETES MELLITUS WITH DIABETIC POLYNEUROPATHY, WITH LONG-TERM CURRENT USE OF INSULIN (H): ICD-10-CM

## 2021-04-14 DIAGNOSIS — E11.42 TYPE 2 DIABETES MELLITUS WITH DIABETIC POLYNEUROPATHY, WITH LONG-TERM CURRENT USE OF INSULIN (H): ICD-10-CM

## 2021-04-17 ENCOUNTER — MYC MEDICAL ADVICE (OUTPATIENT)
Dept: INTERNAL MEDICINE | Facility: CLINIC | Age: 71
End: 2021-04-17

## 2021-04-17 DIAGNOSIS — Z79.4 TYPE 2 DIABETES MELLITUS WITH DIABETIC POLYNEUROPATHY, WITH LONG-TERM CURRENT USE OF INSULIN (H): ICD-10-CM

## 2021-04-17 DIAGNOSIS — E11.42 TYPE 2 DIABETES MELLITUS WITH DIABETIC POLYNEUROPATHY, WITH LONG-TERM CURRENT USE OF INSULIN (H): ICD-10-CM

## 2021-04-19 ENCOUNTER — TELEPHONE (OUTPATIENT)
Dept: FAMILY MEDICINE | Facility: CLINIC | Age: 71
End: 2021-04-19

## 2021-04-19 NOTE — TELEPHONE ENCOUNTER
Reason for Call:  Form, our goal is to have forms completed with 72 hours, however, some forms may require a visit or additional information.    Type of letter, form or note:  DMV DIABETIC FORM    Who is the form from?: Patient    Where did the form come from: Patient or family brought in       What clinic location was the form placed at?: [facility]    Where the form was placed: DR DAMON Box/Folder    What number is listed as a contact on the form?: [phone]       Additional comments: Patient will come and pick it up     Call taken on 4/19/2021 at 2:24 PM by Bridgette Mcdowell

## 2021-04-20 NOTE — TELEPHONE ENCOUNTER
[place] [employer] Insulin Treated Diabetes Mellitus report received and given to Dr. Schneider to complete and sign.  Tita Orteag,

## 2021-04-24 DIAGNOSIS — Z79.4 TYPE 2 DIABETES MELLITUS WITH DIABETIC POLYNEUROPATHY, WITH LONG-TERM CURRENT USE OF INSULIN (H): ICD-10-CM

## 2021-04-24 DIAGNOSIS — E11.42 TYPE 2 DIABETES MELLITUS WITH DIABETIC POLYNEUROPATHY, WITH LONG-TERM CURRENT USE OF INSULIN (H): ICD-10-CM

## 2021-04-26 DIAGNOSIS — E78.5 HYPERLIPIDEMIA LDL GOAL <100: ICD-10-CM

## 2021-04-26 RX ORDER — INSULIN ASPART 100 [IU]/ML
INJECTION, SOLUTION INTRAVENOUS; SUBCUTANEOUS
Qty: 30 ML | Refills: 5 | Status: SHIPPED | OUTPATIENT
Start: 2021-04-26 | End: 2022-04-13

## 2021-04-27 RX ORDER — PRAVASTATIN SODIUM 80 MG/1
TABLET ORAL
Qty: 90 TABLET | Refills: 0 | Status: SHIPPED | OUTPATIENT
Start: 2021-04-27 | End: 2021-08-01

## 2021-04-27 NOTE — TELEPHONE ENCOUNTER
Routing refill request to provider for review/approval because:  Drug interaction warning          Pending Prescriptions:                       Disp   Refills    pravastatin (PRAVACHOL) 80 MG tablet [Phar*90 tab*0        Sig: TAKE 1 TABLET BY MOUTH EVERY DAY        Chirag Prieto RN

## 2021-05-03 ENCOUNTER — OFFICE VISIT (OUTPATIENT)
Dept: UROLOGY | Facility: CLINIC | Age: 71
End: 2021-05-03
Payer: COMMERCIAL

## 2021-05-03 VITALS — SYSTOLIC BLOOD PRESSURE: 131 MMHG | HEART RATE: 64 BPM | OXYGEN SATURATION: 98 % | DIASTOLIC BLOOD PRESSURE: 61 MMHG

## 2021-05-03 DIAGNOSIS — R30.0 DYSURIA: Primary | ICD-10-CM

## 2021-05-03 DIAGNOSIS — R31.29 MICROHEMATURIA: ICD-10-CM

## 2021-05-03 DIAGNOSIS — N40.1 BENIGN PROSTATIC HYPERPLASIA WITH LOWER URINARY TRACT SYMPTOMS, SYMPTOM DETAILS UNSPECIFIED: ICD-10-CM

## 2021-05-03 PROCEDURE — 99214 OFFICE O/P EST MOD 30 MIN: CPT | Mod: 25 | Performed by: UROLOGY

## 2021-05-03 PROCEDURE — 51798 US URINE CAPACITY MEASURE: CPT | Performed by: UROLOGY

## 2021-05-03 RX ORDER — TAMSULOSIN HYDROCHLORIDE 0.4 MG/1
0.4 CAPSULE ORAL AT BEDTIME
Qty: 90 CAPSULE | Refills: 3 | Status: SHIPPED | OUTPATIENT
Start: 2021-05-03 | End: 2021-08-23

## 2021-05-03 NOTE — PROGRESS NOTES
Chief Complaint   Patient presents with     RECHECK       Felipe Campbell is a 70 year old male who presents today for follow up of   Chief Complaint   Patient presents with     RECHECK   Patient was seen recently because of irritative voiding symptoms with abnormal urinalysis.  He was diagnosed with prostatitis and was placed on antibiotics along with Flomax.  Since then his symptoms have improved significantly.  He did submit a urine test last month that showed evidence of microscopic hematuria.  CT scan showed bladder diverticula along with enlarged prostate.      Current Outpatient Medications   Medication Sig Dispense Refill     acetaminophen (TYLENOL) 500 MG tablet Take 500-1,000 mg by mouth every 6 hours as needed for mild pain       aspirin 81 MG tablet Take 1 tablet by mouth daily.  3     B-D U/F insulin pen needle USE FOUR TIMES DAILY OR AS DIRECTED 400 each 1     blood glucose (ONETOUCH VERIO IQ) test strip USE TO TEST FOUR TIMES DAILY OR AS DIRECTED+++NEEDS APPOINTMENT FOR FURTHER REFILLS+++ 400 strip 0     cetirizine (ZYRTEC) 10 MG tablet Take 10 mg by mouth daily as needed for allergies       insulin aspart (NOVOLOG FLEXPEN) 100 UNIT/ML pen INJECT 4 UNITS UNDER THE SKIN THREE TIMES DAILY BEFORE MEAL PLUS CORRECTION FACTOR OF 1/80/80. MAX 25 UNITS PER DAY. 30 mL 5     insulin glargine (LANTUS SOLOSTAR) 100 UNIT/ML pen Inject 6 units under the skin daily or as directed, prime with 2 units each time (8 units/day). Please fill for 90 days. 15 mL 5     lisinopril (ZESTRIL) 5 MG tablet TAKE 1 TABLET BY MOUTH DAILY 90 tablet 1     melatonin 1 MG TABS Take 2 mg by mouth nightly as needed for sleep       Menthol, Topical Analgesic, (BIOFREEZE EX) Apply topically as needed to shoulders/knees.       metFORMIN (GLUCOPHAGE) 500 MG tablet TAKE 2 TABLETS BY MOUTH TWICE DAILY WITH MEALS 360 tablet 1     pravastatin (PRAVACHOL) 80 MG tablet TAKE 1 TABLET BY MOUTH EVERY DAY 90 tablet 0     sildenafil (REVATIO) 20 MG  tablet Take between 2-4 tablets at a time for planned sexual activity, max 5 pills per day. 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     Hayfever     summer and      Hyperlipidemia LDL goal <100 10/31/2010     Microalbuminuria 2011     Nonsenile cataract      Past Surgical History:   Procedure Laterality Date     C APPENDECTOMY  71     COLONOSCOPY WITH CO2 INSUFFLATION N/A 2016    Procedure: COLONOSCOPY WITH CO2 INSUFFLATION;  Surgeon: Torey Almazan MD;  Location: [facility] OR     HERNIA REPAIR, INGUINAL RT/LT  age 5    left     TUNA       Family History   Problem Relation Age of Onset     Breast Cancer Mother      Diabetes Father      Diabetes Maternal Grandmother      Congenital Anomalies Sister      Psychotic Disorder Daughter      Glaucoma No family hx of      Macular Degeneration No family hx of      Social History     Socioeconomic History     Marital status:      Spouse name: None     Number of children: None     Years of education: None     Highest education level: None   Occupational History     None   Social Needs     Financial resource strain: None     Food insecurity     Worry: None     Inability: None     Transportation needs     Medical: None     Non-medical: None   Tobacco Use     Smoking status: Former Smoker     Packs/day: 0.50     Years: 7.00     Pack years: 3.50     Types: Cigarettes     Start date: 1972     Quit date: 1979     Years since quittin.3     Smokeless tobacco: Never Used   Substance and Sexual Activity     Alcohol use: Yes     Comment: occasionally     Drug use: No     Sexual  activity: Yes     Partners: Female     Birth control/protection: None   Lifestyle     Physical activity     Days per week: None     Minutes per session: None     Stress: None   Relationships     Social connections     Talks on phone: None     Gets together: None     Attends [employer] service: None     Active member of club or organization: None     Attends meetings of clubs or organizations: None     Relationship status: None     Intimate partner violence     Fear of current or ex partner: None     Emotionally abused: None     Physically abused: None     Forced sexual activity: None   Other Topics Concern     Parent/sibling w/ CABG, MI or angioplasty before 65F 55M? Not Asked   Social History Narrative     None       REVIEW OF SYSTEMS  =================  C: NEGATIVE for fever, chills, change in weight  I: NEGATIVE for worrisome rashes, moles or lesions  E/M: NEGATIVE for ear, mouth and throat problems  R: NEGATIVE for significant cough or SHORTNESS OF BREATH  CV:  NEGATIVE for chest pain, palpitations or peripheral edema  GI: NEGATIVE for nausea, abdominal pain, heartburn, or change in bowel habits  NEURO: NEGATIVE numbness/weakness  : see HPI  PSYCH: NEGATIVE depression/anxiety  LYmph: no new enlarged lymph nodes  Ortho: no new trauma/movements    Physical Exam:  /61 (BP Location: Right arm, Patient Position: Chair, Cuff Size: Adult Large)   Pulse 64   SpO2 98%    Patient is pleasant, in no acute distress, good general condition.  Lung: no evidence of respiratory distress    Abdomen: Soft, nondistended, non tender. No masses. No rebound or guarding.   Exam: No CVA tenderness bladder scan with residual urine of over 100 mL which is improvement from previously of over 300 mL.  Skin: Warm and dry.  No redness.  Psych: normal mood and affect  Neuro: alert and oriented  Musculaskeletal: moving all extremities    Assessment/Plan:   (R30.0) Dysuria  (primary encounter diagnosis)  Comment:    Plan: recheck UA  today    (N40.1) Benign prostatic hyperplasia with lower urinary tract symptoms, symptom details unspecified  Comment:  Better bladder empting  Plan: MEASURE POST-VOID RESIDUAL URINE/BLADDER         CAPACITY, US NON-IMAGING (00826), tamsulosin         (FLOMAX) 0.4 MG capsule         Cont with flomax    (R31.29) Microhematuria  Comment:    Plan: cysto next

## 2021-06-04 ENCOUNTER — OFFICE VISIT (OUTPATIENT)
Dept: UROLOGY | Facility: CLINIC | Age: 71
End: 2021-06-04
Payer: COMMERCIAL

## 2021-06-04 ENCOUNTER — TELEPHONE (OUTPATIENT)
Dept: UROLOGY | Facility: CLINIC | Age: 71
End: 2021-06-04

## 2021-06-04 VITALS — OXYGEN SATURATION: 98 % | DIASTOLIC BLOOD PRESSURE: 77 MMHG | SYSTOLIC BLOOD PRESSURE: 133 MMHG | HEART RATE: 68 BPM

## 2021-06-04 DIAGNOSIS — R31.29 MICROHEMATURIA: ICD-10-CM

## 2021-06-04 DIAGNOSIS — R30.0 DYSURIA: Primary | ICD-10-CM

## 2021-06-04 DIAGNOSIS — N40.1 BENIGN PROSTATIC HYPERPLASIA WITH LOWER URINARY TRACT SYMPTOMS, SYMPTOM DETAILS UNSPECIFIED: ICD-10-CM

## 2021-06-04 LAB
ALBUMIN UR-MCNC: NEGATIVE MG/DL
APPEARANCE UR: CLEAR
BACTERIA #/AREA URNS HPF: ABNORMAL /HPF
BILIRUB UR QL STRIP: NEGATIVE
COLOR UR AUTO: YELLOW
GLUCOSE UR STRIP-MCNC: NEGATIVE MG/DL
HGB UR QL STRIP: ABNORMAL
KETONES UR STRIP-MCNC: NEGATIVE MG/DL
LEUKOCYTE ESTERASE UR QL STRIP: NEGATIVE
NITRATE UR QL: NEGATIVE
NON-SQ EPI CELLS #/AREA URNS LPF: ABNORMAL /LPF
PH UR STRIP: 5.5 PH (ref 5–7)
RBC #/AREA URNS AUTO: ABNORMAL /HPF
SOURCE: ABNORMAL
SP GR UR STRIP: 1.02 (ref 1–1.03)
UROBILINOGEN UR STRIP-ACNC: 0.2 EU/DL (ref 0.2–1)
WBC #/AREA URNS AUTO: ABNORMAL /HPF

## 2021-06-04 PROCEDURE — 81001 URINALYSIS AUTO W/SCOPE: CPT | Performed by: UROLOGY

## 2021-06-04 PROCEDURE — 52000 CYSTOURETHROSCOPY: CPT | Performed by: UROLOGY

## 2021-06-04 PROCEDURE — 99212 OFFICE O/P EST SF 10 MIN: CPT | Mod: 25 | Performed by: UROLOGY

## 2021-06-04 PROCEDURE — 51798 US URINE CAPACITY MEASURE: CPT | Performed by: UROLOGY

## 2021-06-04 NOTE — PATIENT INSTRUCTIONS
Surgery Preparation    You will receive a phone call from the [facility] within 1-2 days. If you do not receive a call within 2 days, contact [phone] to schedule the procedure. You can write the location/date/time of surgery in the space provided below for your records.    Location of Surgery:                                          Date of Surgery:                                                 Time of Arrival:                                                   Time of Surgery:                                                   Please arrange an appointment with a primary care provider for a pre-op physical. This is a mandatory appointment that needs to be completed within the two weeks prior to your surgery date. This gives clearance for you to receive anesthesia during your procedure. If you have had a pre-op physical within 30 days of your surgery date, you may not need another one. Check with your provider.    If you are having a Same Day Surgery, you must have a  to and from the procedure. Someone needs to stay with you post-operatively for 12 hours.     Do not eat or drink any solids, milk products, or pulpy juices after midnight the night before your surgery. You may have clear liquids up to 8 hours prior to the surgery. This would include water, soda, coffee (without cream), tea, broth, and jello.    Please stop all over the counter blood thinners such as Aspirin, Advil, Aleve, Ibuprofen, Motrin, and Excedrin one week prior to surgery. Please discontinue prescription blood thinners 5-7 days prior to surgery, per your primary physician's orders.     Please check with your insurance company to confirm that your procedure is covered, and that the facility is in-network.     Call the Urology Department @ [phone] if you have questions about your surgery, or if you need to reschedule your procedure.       Patient Education     TURP  You have an enlarged prostate gland. This is  also called benign prostatic hyperplasia (BPH). BPH is not cancer. But it can cause problems with urination. To ease your symptoms, your healthcare provider may advise laser prostatectomy . This procedure uses a laser (concentrated light energy). The laser removes prostate tissue from around the urethra. The urethra is the tube that carries urine from the bladder out of the body. The surgery lets urine flow more freely. The laser destroys the prostate tissue. This means it can t be looked at for signs of cancer.     Types of prostate laser surgery  The type of laser surgery your healthcare provider will do may depend on your health, the size of your prostate, and the type of tools available. The different types of prostate laser surgery are:    Photoselective vaporization of the prostate (PVP). The laser is used to melt away (vaporize) the extra prostate tissue.    Holmium laser ablation of the prostate (HoLAP). This type of surgery is similar to PVP. But it uses a different kind of laser.    Holmium laser enucleation of the prostate (HoLEP). In this procedure, the laser cuts and removes extra tissue. A tool cuts the tissue into small pieces. This makes them easier to remove.   Possible risks and side effects of laser prostatectomy  All procedures have risks. Some possible risks of this procedure include:    Bleeding    Infection    Pneumonia    Blood clots    Scarring or narrowing of the urethra. This can cause trouble urinating.    Only some relief of symptoms    Erectile dysfunction (rare)    Loss of bladder control (very rare)    Loss of ejaculation  Getting ready for the procedure  Your healthcare team will give you detailed instructions on how to get ready for the procedure. Be sure to follow them.    You will be asked to read and sign a form consenting to the surgery. Be sure to read the entire document. Have all of your questions answered before signing it.    Tell your healthcare team about all medicines  you take. This includes prescription and over-the-counter medicines, vitamins, herbs, and other supplements. It also includes any blood thinners such as warfarin, clopidogrel, or daily aspirin. You may need to stop taking some or all of them before surgery.    Follow any directions you are given for not eating or drinking before your procedure.    When you arrive for the procedure, you will have an IV (intravenous) line placed. This gives you fluids and medicines during the procedure.    You will be given medicine to keep you from feeling pain (anesthesia) before the procedure. You may have 1 or more of the following:  ? Local anesthesia. Your bladder and urethra are numbed.  ? Regional anesthesia.  Your body below the waist is numbed.  ? General anesthesia. You are in a state like deep sleep.  During the procedure  Your healthcare provider can help explain the details of your surgery. These details depend on the type of laser surgery that will be done. In general, you can expect the following:    The procedure itself often takes less than an hour.    A thin, tube-like telescope (cystoscope) is put through the opening in your penis and into your urethra. This tool lets your provider see your urethra and your prostate. They are seen either through the cystoscope or on a screen.    The laser is put through the cystoscope. The laser is then used to destroy the extra prostate tissue.    You may have a tube (urinary catheter) to help your bladder drain urine for a short time after the procedure. It s removed when it s no longer needed.  After the procedure  You may go home the same day after your surgery. Or you may stay a night in the hospital. An adult friend or family member should drive you home. To get the best results from the procedure, follow your healthcare provider's instructions. Keep your follow-up appointments.  Your prostate will likely be sore at first. This will get better as you heal. Here are some  things you can expect:    You may be sent home with a catheter to drain urine from your bladder. If so, you may wear a leg bag until it's no longer needed. The catheter will allow the area to heal. It will help prevent painful urination.    Your provider may also prescribe antibiotics to prevent infection and pain medicine to ease any discomfort.    In about a week, you ll visit your provider to have your catheter removed. If swelling still makes urination difficult, the catheter may be left in longer. After the catheter is removed, you may need to urinate more often. This is normal and should get better with time.    For the first few weeks after your procedure, you may notice that your urine is cloudy. Or you may have blood or blood clots in your urine. This is normal while your body rids itself of the treated tissue. These symptoms may start to get better during the first few weeks. But it may take a few months before they go away. Your provider can tell you when you can have sex again and when you can go back to work.    You also may be told to not lift anything over 10 pounds (4.5 kg). And to not bend over to lift things from the ground.    Drink plenty of fluids to help flush out your bladder.  Getting back to sex  You may be glad to know that BPH and its treatments rarely cause problems with sex. You may find that you have retrograde ejaculation. This happens when semen goes into the bladder instead of the urethra during ejaculation. Retrograde ejaculation is common after procedures for BPH. But even if this does occur, orgasm shouldn t feel any different than it did before the procedure. And it should not cause any health problems or affect your sexual function. If you notice any problems with sex, talk with your healthcare provider. Help may be available.  When to call the healthcare provider  Contact your healthcare provider right away if:    You have a fever of 100.4 F (38 C) or higher, or as directed by  your provider    You have excessive bleeding    You have pain not relieved by medicines    You notice that no urine is draining from the catheter, or the catheter falls out    You have a frequent or very strong urge to urinate    You re not able to urinate, or notice a decrease in urine flow  Joana last reviewed this educational content on 6/1/2019 2000-2021 [employer]. All rights reserved. This information is not intended as a substitute for professional medical care. Always follow your healthcare professional's instructions.

## 2021-06-04 NOTE — PROGRESS NOTES
S: Felipe Campbell is a 70 year old male returns for weak stream, dysuria, hematuria.    Patient is draped and prepped.  Flexible cystoscopy placed under direct vision.      The anterior urethra is normal   The prostatic urethra showed bilateral lobe enlargement.     The length is 3cm,  the coaptation is 3 cm.     In the bladder there is trabeculation grade 3 with multiple diverticula.    Assessment/Plan:  (R30.0) Dysuria  (primary encounter diagnosis)  Comment:    Plan: UA reflex to Microscopic and Culture [[id number]]             (R31.29) Microhematuria  Comment:    Plan: CYSTOURETHROSCOPY (85564)          Neg CT/          Prostatic bleeding    (N40.1) Benign prostatic hyperplasia with lower urinary tract symptoms, symptom details unspecified  Comment:    Plan: MEASURE POST-VOID RESIDUAL URINE/BLADDER         CAPACITY, US NON-IMAGING (92627), -  Over 200 ml        CYSTOURETHROSCOPY ([id number])          Laser TURP discussed          Video reviewed           Risks of bleeding/infection/ED/incontinence/retrograde ejaculation discussed           Schedule for elective surgery           15 min spent

## 2021-06-04 NOTE — TELEPHONE ENCOUNTER
Type of surgery: XPS LASER VAPORIZATION OF PROSTATE  CPT 87541   Dysuria R30.0     Microhematuria R31.29     Benign prostatic hyperplasia with lower urinary tract symptoms, symptom details unspecified N40.1    Location of surgery: [facility]  Date and time of surgery: 07/12/2021  Surgeon: Wm  Pre-Op Appt Date: 07/06/2021  Post-Op Appt Date: 08/23/2021   Packet sent out: Yes  Pre-cert/Authorization completed:    No prior auth needed, per [facility] online list  Date: 06/04/21    Thank you,   Cristal Domínguez   Prior Authorization Department  [phone]

## 2021-06-11 DIAGNOSIS — R80.9 MICROALBUMINURIA: ICD-10-CM

## 2021-06-12 ENCOUNTER — MYC MEDICAL ADVICE (OUTPATIENT)
Dept: INTERNAL MEDICINE | Facility: CLINIC | Age: 71
End: 2021-06-12

## 2021-06-12 DIAGNOSIS — E11.42 TYPE 2 DIABETES MELLITUS WITH DIABETIC POLYNEUROPATHY, WITH LONG-TERM CURRENT USE OF INSULIN (H): ICD-10-CM

## 2021-06-12 DIAGNOSIS — Z79.4 TYPE 2 DIABETES MELLITUS WITH DIABETIC POLYNEUROPATHY, WITH LONG-TERM CURRENT USE OF INSULIN (H): ICD-10-CM

## 2021-06-14 RX ORDER — FLURBIPROFEN SODIUM 0.3 MG/ML
SOLUTION/ DROPS OPHTHALMIC
OUTPATIENT
Start: 2021-06-14

## 2021-06-14 RX ORDER — FLURBIPROFEN SODIUM 0.3 MG/ML
SOLUTION/ DROPS OPHTHALMIC
Qty: 400 EACH | Refills: 0 | Status: SHIPPED | OUTPATIENT
Start: 2021-06-14 | End: 2021-09-17

## 2021-06-14 RX ORDER — LISINOPRIL 2.5 MG/1
TABLET ORAL
Qty: 180 TABLET | Refills: 2 | Status: SHIPPED | OUTPATIENT
Start: 2021-06-14 | End: 2021-09-13

## 2021-06-14 NOTE — TELEPHONE ENCOUNTER
Duplicate refused.    Nicolette Sampson, RN on 6/14/2021 at 3:55 PM      insulin pen needle (B-D U/F) 31G X 5 MM miscellaneous 400 each 0 6/14/2021  No   Sig: Use 4 pen needles daily or as directed.   Sent to pharmacy as: BD Pen Needle Mini U/F 31G X 5 MM (insulin pen needle)   Class: E-Prescribe   Order: [id number]   E-Prescribing Status: Receipt confirmed by pharmacy (6/14/2021 12:19 PM CDT)   Printout Tracking    External Result Report   Medication Administration Instructions    Use 4 pen needles daily or as directed.   Pharmacy    [id number] - [address] AT [place] [address]

## 2021-06-14 NOTE — TELEPHONE ENCOUNTER
Prescription approved per [facility] Refill Protocol.    Alisson Clemens RN  [facility]

## 2021-07-06 ENCOUNTER — OFFICE VISIT (OUTPATIENT)
Dept: INTERNAL MEDICINE | Facility: CLINIC | Age: 71
End: 2021-07-06
Payer: COMMERCIAL

## 2021-07-06 ENCOUNTER — MYC MEDICAL ADVICE (OUTPATIENT)
Dept: INTERNAL MEDICINE | Facility: CLINIC | Age: 71
End: 2021-07-06

## 2021-07-06 VITALS
OXYGEN SATURATION: 99 % | TEMPERATURE: 98.4 F | RESPIRATION RATE: 16 BRPM | DIASTOLIC BLOOD PRESSURE: 72 MMHG | SYSTOLIC BLOOD PRESSURE: 116 MMHG | BODY MASS INDEX: 25.46 KG/M2 | WEIGHT: 206.4 LBS | HEART RATE: 64 BPM

## 2021-07-06 DIAGNOSIS — Z01.818 PREOP GENERAL PHYSICAL EXAM: Primary | ICD-10-CM

## 2021-07-06 DIAGNOSIS — E11.42 TYPE 2 DIABETES MELLITUS WITH DIABETIC POLYNEUROPATHY, WITH LONG-TERM CURRENT USE OF INSULIN (H): ICD-10-CM

## 2021-07-06 DIAGNOSIS — Z79.4 TYPE 2 DIABETES MELLITUS WITH DIABETIC POLYNEUROPATHY, WITH LONG-TERM CURRENT USE OF INSULIN (H): ICD-10-CM

## 2021-07-06 DIAGNOSIS — R93.89 ABNORMAL CT SCAN: ICD-10-CM

## 2021-07-06 LAB
ANION GAP SERPL CALCULATED.3IONS-SCNC: 4 MMOL/L (ref 3–14)
BUN SERPL-MCNC: 17 MG/DL (ref 7–30)
CALCIUM SERPL-MCNC: 9 MG/DL (ref 8.5–10.1)
CHLORIDE SERPL-SCNC: 110 MMOL/L (ref 94–109)
CO2 SERPL-SCNC: 27 MMOL/L (ref 20–32)
CREAT SERPL-MCNC: 0.94 MG/DL (ref 0.66–1.25)
GFR SERPL CREATININE-BSD FRML MDRD: 82 ML/MIN/{1.73_M2}
GLUCOSE SERPL-MCNC: 121 MG/DL (ref 70–99)
HBA1C MFR BLD: 6.4 % (ref 0–5.6)
HGB BLD-MCNC: 13.6 G/DL (ref 13.3–17.7)
POTASSIUM SERPL-SCNC: 4.7 MMOL/L (ref 3.4–5.3)
SODIUM SERPL-SCNC: 141 MMOL/L (ref 133–144)

## 2021-07-06 PROCEDURE — 99215 OFFICE O/P EST HI 40 MIN: CPT | Performed by: INTERNAL MEDICINE

## 2021-07-06 PROCEDURE — 83036 HEMOGLOBIN GLYCOSYLATED A1C: CPT | Performed by: INTERNAL MEDICINE

## 2021-07-06 PROCEDURE — 85018 HEMOGLOBIN: CPT | Performed by: INTERNAL MEDICINE

## 2021-07-06 PROCEDURE — 80048 BASIC METABOLIC PNL TOTAL CA: CPT | Performed by: INTERNAL MEDICINE

## 2021-07-06 PROCEDURE — 36415 COLL VENOUS BLD VENIPUNCTURE: CPT | Performed by: INTERNAL MEDICINE

## 2021-07-06 PROCEDURE — 93000 ELECTROCARDIOGRAM COMPLETE: CPT | Performed by: INTERNAL MEDICINE

## 2021-07-06 NOTE — PROGRESS NOTES
In the course of conducting today's preoperative history and physical examination I came across the CT Urogram wo & w Contrast that was done 4 months ago. It made mention of a lung nodule which is solid and approximately 1.3 centimeters, a concerning size. Patient was asked to discuss this with me and we are doing so now. I did review this with the official overread of the film per radiology , Dr. Meraz and we also discussed the finding of a hypodense liver lesion. As I suspected , the concern over the liver lesion is much less concerning then the lung process. It is agreed that the first step should be to order a redo chest CT scan with contrast and further follow up is depending on the test results . I left a detailed voice mail message regarding all  Of this on both this patients mobile phone and his home phone     Please do a check-in with him to be sure he got my message and will get this chest CT scan scheduled soon [ preferred is within the next week ]    Curt Schneider MD

## 2021-07-06 NOTE — PATIENT INSTRUCTIONS
Good luck with your procedure.    The night before the surgery I am recommending you take only 3 units of the Lantus (Insulin Glargine) and morning of the surgery you should completely skip taking any novolog (ultra-short acting insulin aspart) until you are fully awake and eating    Skip also the aspirin for a week     Skip the lisinopril just the morning of the surgery .    You can take the other medications as scheduled .    Curt Schneider MD

## 2021-07-06 NOTE — LETTER
July 7, 2021      Felipe Campbell  [address]  [place] [address]        Dear Leonid:    We are writing to inform you of your test results.    These are all okay for surgery.    Resulted Orders   Hemoglobin A1c   Result Value Ref Range    Hemoglobin A1C 6.4 (H) 0 - 5.6 %      Comment:      Normal <5.7% Prediabetes 5.7-6.4%  Diabetes 6.5% or higher - adopted from ADA   consensus guidelines.     Basic metabolic panel   Result Value Ref Range    Sodium 141 133 - 144 mmol/L    Potassium 4.7 3.4 - 5.3 mmol/L    Chloride 110 (H) 94 - 109 mmol/L    Carbon Dioxide 27 20 - 32 mmol/L    Anion Gap 4 3 - 14 mmol/L    Glucose 121 (H) 70 - 99 mg/dL    Urea Nitrogen 17 7 - 30 mg/dL    Creatinine 0.94 0.66 - 1.25 mg/dL    GFR Estimate 82 >60 mL/min/[1.73_m2]      Comment:      Non  GFR Calc  Starting 12/18/2018, serum creatinine based estimated GFR (eGFR) will be   calculated using the Chronic Kidney Disease Epidemiology Collaboration   (CKD-EPI) equation.      GFR Estimate If Black >90 >60 mL/min/[1.73_m2]      Comment:       GFR Calc  Starting 12/18/2018, serum creatinine based estimated GFR (eGFR) will be   calculated using the Chronic Kidney Disease Epidemiology Collaboration   (CKD-EPI) equation.      Calcium 9.0 8.5 - 10.1 mg/dL   Hemoglobin   Result Value Ref Range    Hemoglobin 13.6 13.3 - 17.7 g/dL       If you have any questions or concerns, please call the clinic at the number listed above.     Sincerely,      Curt Schneider MD/akilah

## 2021-07-06 NOTE — Clinical Note
I need to speak with the radiologist that read this patients CT Urogram wo & w Contrast on march 2021. Lets shoot for today

## 2021-07-07 ENCOUNTER — DOCUMENTATION ONLY (OUTPATIENT)
Dept: UROLOGY | Facility: CLINIC | Age: 71
End: 2021-07-07

## 2021-07-07 ENCOUNTER — MYC MEDICAL ADVICE (OUTPATIENT)
Dept: INTERNAL MEDICINE | Facility: CLINIC | Age: 71
End: 2021-07-07

## 2021-07-07 DIAGNOSIS — R91.1 SOLID NODULE OF LUNG GREATER THAN 8 MM IN DIAMETER: Primary | ICD-10-CM

## 2021-07-07 DIAGNOSIS — N40.1 BENIGN PROSTATIC HYPERPLASIA WITH LOWER URINARY TRACT SYMPTOMS, SYMPTOM DETAILS UNSPECIFIED: Primary | ICD-10-CM

## 2021-07-07 NOTE — TELEPHONE ENCOUNTER
I don't understand . I knew I placed the orders already and when I placed it today it says it is a duplicate order, already placed.    Something was missed here and not sure I understand . Orders placed     Just confirm and help move this along as soon as possible. Please help this patient get his scan. Reroute if additional input requested from me     Curt Schneider MD

## 2021-07-07 NOTE — TELEPHONE ENCOUNTER
Forwarded to provider to place CT orders.    Copied from office visit dated 7/6/21  In the course of conducting today's preoperative history and physical examination I came across the CT Urogram wo & w Contrast that was done 4 months ago. It made mention of a lung nodule which is solid and approximately 1.3 centimeters, a concerning size. Patient was asked to discuss this with me and we are doing so now. I did review this with the official overread of the film per radiology , Dr. Meraz and we also discussed the finding of a hypodense liver lesion. As I suspected , the concern over the liver lesion is much less concerning then the lung process. It is agreed that the first step should be to order a redo chest CT scan with contrast and further follow up is depending on the test results . I left a detailed voice mail message regarding all  Of this on both this patients mobile phone and his home phone      Please do a check-in with him to be sure he got my message and will get this chest CT scan scheduled soon [ preferred is within the next week ]     Curt Schneider MD

## 2021-07-09 DIAGNOSIS — N40.1 BENIGN PROSTATIC HYPERPLASIA WITH LOWER URINARY TRACT SYMPTOMS, SYMPTOM DETAILS UNSPECIFIED: ICD-10-CM

## 2021-07-09 LAB
LABORATORY COMMENT REPORT: NORMAL
SARS-COV-2 RNA RESP QL NAA+PROBE: NEGATIVE
SARS-COV-2 RNA RESP QL NAA+PROBE: NORMAL
SPECIMEN SOURCE: NORMAL
SPECIMEN SOURCE: NORMAL

## 2021-07-09 PROCEDURE — U0005 INFEC AGEN DETEC AMPLI PROBE: HCPCS | Performed by: UROLOGY

## 2021-07-09 PROCEDURE — U0003 INFECTIOUS AGENT DETECTION BY NUCLEIC ACID (DNA OR RNA); SEVERE ACUTE RESPIRATORY SYNDROME CORONAVIRUS 2 (SARS-COV-2) (CORONAVIRUS DISEASE [COVID-19]), AMPLIFIED PROBE TECHNIQUE, MAKING USE OF HIGH THROUGHPUT TECHNOLOGIES AS DESCRIBED BY CMS-2020-01-R: HCPCS | Performed by: UROLOGY

## 2021-07-09 NOTE — TELEPHONE ENCOUNTER
Patient is wondering if Dr Schneider could work him in soon after his CT scan on 7-19 f2f. [phone]

## 2021-07-19 ENCOUNTER — ANCILLARY PROCEDURE (OUTPATIENT)
Dept: CT IMAGING | Facility: CLINIC | Age: 71
End: 2021-07-19
Attending: INTERNAL MEDICINE
Payer: COMMERCIAL

## 2021-07-19 DIAGNOSIS — R93.89 ABNORMAL CT SCAN: ICD-10-CM

## 2021-07-19 PROCEDURE — 71260 CT THORAX DX C+: CPT | Mod: TC | Performed by: RADIOLOGY

## 2021-07-19 RX ORDER — IOPAMIDOL 755 MG/ML
80 INJECTION, SOLUTION INTRAVASCULAR ONCE
Status: COMPLETED | OUTPATIENT
Start: 2021-07-19 | End: 2021-07-19

## 2021-07-19 RX ADMIN — IOPAMIDOL 80 ML: 755 INJECTION, SOLUTION INTRAVASCULAR at 10:07

## 2021-07-22 ENCOUNTER — OFFICE VISIT (OUTPATIENT)
Dept: INTERNAL MEDICINE | Facility: CLINIC | Age: 71
End: 2021-07-22
Payer: COMMERCIAL

## 2021-07-22 VITALS
DIASTOLIC BLOOD PRESSURE: 72 MMHG | HEART RATE: 70 BPM | RESPIRATION RATE: 14 BRPM | OXYGEN SATURATION: 98 % | SYSTOLIC BLOOD PRESSURE: 123 MMHG | TEMPERATURE: 98 F | WEIGHT: 203.58 LBS | BODY MASS INDEX: 25.11 KG/M2

## 2021-07-22 DIAGNOSIS — R93.89 ABNORMAL CT SCAN, CHEST: Primary | ICD-10-CM

## 2021-07-22 DIAGNOSIS — Z13.89 SCREENING FOR DIABETIC PERIPHERAL NEUROPATHY: ICD-10-CM

## 2021-07-22 PROCEDURE — 99214 OFFICE O/P EST MOD 30 MIN: CPT | Performed by: INTERNAL MEDICINE

## 2021-07-22 PROCEDURE — 99207 PR FOOT EXAM NO CHARGE: CPT | Performed by: INTERNAL MEDICINE

## 2021-07-22 NOTE — PROGRESS NOTES
Assessment & Plan     Abnormal CT scan, chest  Patient is here today because he just had some questions that he wanted more clarifications with. We reviewed his chest CT scan in greater detail, clearly the most concerning thing was a pulmonary nodule , see most recent previous office visit with me and also the results note from this CT. It's great news, the official overread of the film per radiology reveals no concerns about a nodule growing in fact the nodule in the recheck from July 2021 is considered to possibly show nothing more then atelectasis . This patient has a brief and remote history of smoking and has never had any cancer and so all in all he should be considered a low risk patient. Because the nodule was greater then 8 millimeters I do age related issues  A further recheck in 6 months is about right. We will see patient back for his diabetes mellitus recheck visit at that time and we can order the redo chest CT scan at that time. Patient had a few other questions     1. Cholelithiasis is mentioned. We talked about the benignity of this. He has zero symptoms . Keep an eye on things   2. Diffuse nonspecific esophageal thickening is mentioned . This is said to need clinical correlation. He has no symptoms of gastroesophageal reflux disease or dysphagia or odynophagia or dysphagia . We discussed what to be on the watch for  . This is so nonspecific that a posture of observation is recommended. Further follow up depending on how things go . This is often a functional finding and I mention the official overread of the film per radiology mentions no abnormal appearance that would rise to the level of needing further workup such as esphagogastroduodenoscopy      3. We also discussed the meaning of a hypodense liver lesion. It just needs no follow up at this point. See prior most recent previous [employer] message to this patient        Screening for diabetic peripheral neuropathy  Positive diabetes  neuropathy, Self foot examinations are emphasized  , patient well aware of the issues and diagnosis   - FOOT EXAM    Review of the result(s) of each unique test - we reviewed his labs from last week  Prescription drug management  30 minutes spent on the date of the encounter doing chart review, history and exam, documentation and further activities per the note        Return in about 6 months (around 1/22/2022).    Curt Schneider MD  [facility] ANA ROSA Jaquez is a 71 year old who presents for the following health issues     HPI     Diabetes Follow-up    How often are you checking your blood sugar? Three times daily  Blood sugar testing frequency justification:  Adjustment of medication(s)  What time of day are you checking your blood sugars (select all that apply)?  Before and after meals  Have you had any blood sugars above 200?  No  Have you had any blood sugars below 70?  No    What symptoms do you notice when your blood sugar is low?  None    What concerns do you have today about your diabetes? None     Do you have any of these symptoms? (Select all that apply)  Numbness in feet      BP Readings from Last 2 Encounters:   07/06/21 116/72   06/04/21 133/77     Hemoglobin A1C (%)   Date Value   07/06/2021 6.4 (H)   02/15/2021 7.1 (H)     LDL Cholesterol Calculated (mg/dL)   Date Value   02/15/2021 71   01/13/2020 102 (H)                 How many servings of fruits and vegetables do you eat daily?  2-3    On average, how many sweetened beverages do you drink each day (Examples: soda, juice, sweet tea, etc.  Do NOT count diet or artificially sweetened beverages)?   0    How many days per week do you exercise enough to make your heart beat faster? 4    How many minutes a day do you exercise enough to make your heart beat faster? 60 or more    How many days per week do you miss taking your medication? 0    See as detailed above     Review of Systems   Constitutional, HEENT, cardiovascular,  pulmonary, gi and gu systems are negative, except as otherwise noted.      Objective    /72   Pulse 70   Temp 98  F (36.7  C) (Oral)   Resp 14   Wt 92.3 kg (203 lb 9.3 oz)   SpO2 98%   BMI 25.11 kg/m    Body mass index is 25.11 kg/m .  Physical Exam   GENERAL: healthy, alert and no distress  EYES: Eyes grossly normal to inspection, PERRL and conjunctivae and sclerae normal  MS: no gross musculoskeletal defects noted, no edema  SKIN: no suspicious lesions or rashes  PSYCH: mentation appears normal, affect normal/bright  Diabetic foot exam: normal DP and PT pulses, no trophic changes or ulcerative lesions and abnormal sensory exam consistent with known diagnosis of diabetes neuropathy     Orders Placed This Encounter   Procedures     FOOT EXAM

## 2021-07-30 DIAGNOSIS — E78.5 HYPERLIPIDEMIA LDL GOAL <100: ICD-10-CM

## 2021-08-01 RX ORDER — PRAVASTATIN SODIUM 80 MG/1
TABLET ORAL
Qty: 90 TABLET | Refills: 0 | Status: SHIPPED | OUTPATIENT
Start: 2021-08-01 | End: 2021-11-03

## 2021-08-23 ENCOUNTER — OFFICE VISIT (OUTPATIENT)
Dept: UROLOGY | Facility: CLINIC | Age: 71
End: 2021-08-23
Payer: COMMERCIAL

## 2021-08-23 VITALS — HEART RATE: 60 BPM | SYSTOLIC BLOOD PRESSURE: 124 MMHG | DIASTOLIC BLOOD PRESSURE: 77 MMHG | OXYGEN SATURATION: 98 %

## 2021-08-23 DIAGNOSIS — N40.1 BENIGN PROSTATIC HYPERPLASIA WITH LOWER URINARY TRACT SYMPTOMS, SYMPTOM DETAILS UNSPECIFIED: Primary | ICD-10-CM

## 2021-08-23 PROCEDURE — 51798 US URINE CAPACITY MEASURE: CPT | Performed by: UROLOGY

## 2021-08-23 PROCEDURE — 99024 POSTOP FOLLOW-UP VISIT: CPT | Performed by: UROLOGY

## 2021-08-23 ASSESSMENT — PAIN SCALES - GENERAL: PAINLEVEL: NO PAIN (0)

## 2021-08-23 NOTE — PROGRESS NOTES
Chief Complaint   Patient presents with     RECHECK     post laser TURP       Felipe Campbell is a 71 year old male who presents today for follow up of   Chief Complaint   Patient presents with     RECHECK     post laser TURP    f/u post TURP.  He has been doing well since surgery with better flow/decreased irritative voiding symptoms.    Current Outpatient Medications   Medication Sig Dispense Refill     acetaminophen (TYLENOL) 500 MG tablet Take 500-1,000 mg by mouth every 6 hours as needed for mild pain       aspirin 81 MG tablet Take 1 tablet by mouth daily.  3     blood glucose (ONETOUCH VERIO IQ) test strip USE TO TEST FOUR TIMES DAILY OR AS DIRECTED. NEEDS APPOINTMENT FOR FURTHER REFILLS 400 strip 0     cetirizine (ZYRTEC) 10 MG tablet Take 10 mg by mouth daily as needed for allergies       insulin aspart (NOVOLOG FLEXPEN) 100 UNIT/ML pen INJECT 4 UNITS UNDER THE SKIN THREE TIMES DAILY BEFORE MEAL PLUS CORRECTION FACTOR OF 1/80/80. MAX 25 UNITS PER DAY. 30 mL 5     insulin glargine (LANTUS SOLOSTAR) 100 UNIT/ML pen Inject 6 units under the skin daily or as directed, prime with 2 units each time (8 units/day). Please fill for 90 days. 15 mL 5     insulin pen needle (B-D U/F) 31G X 5 MM miscellaneous Use 4 pen needles daily or as directed. 400 each 0     lisinopril (ZESTRIL) 2.5 MG tablet TAKE 2 TABLETS BY MOUTH DAILY 180 tablet 2     melatonin 1 MG TABS Take 2 mg by mouth nightly as needed for sleep       Menthol, Topical Analgesic, (BIOFREEZE EX) Apply topically as needed to shoulders/knees.       metFORMIN (GLUCOPHAGE) 500 MG tablet TAKE 2 TABLETS BY MOUTH TWICE DAILY WITH MEALS 360 tablet 1     pravastatin (PRAVACHOL) 80 MG tablet TAKE 1 TABLET BY MOUTH EVERY DAY 90 tablet 0     sildenafil (REVATIO) 20 MG tablet Take between 2-4 tablets at a time for planned sexual activity, max 5 pills per day. 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     Hayfever     summer and fall     Hyperlipidemia LDL goal <100 10/31/2010     Microalbuminuria 2011     Nonsenile cataract      Past Surgical History:   Procedure Laterality Date     C APPENDECTOMY  71     COLONOSCOPY  2016     COLONOSCOPY WITH CO2 INSUFFLATION N/A 2016    Procedure: COLONOSCOPY WITH CO2 INSUFFLATION;  Surgeon: Torey Almazan MD;  Location: [facility] OR     HERNIA REPAIR, INGUINAL RT/LT  age 5    left     TUNA       Family History   Problem Relation Age of Onset     Breast Cancer Mother      Diabetes Father      Diabetes Maternal Grandmother      Congenital Anomalies Sister      Psychotic Disorder Daughter      Glaucoma No family hx of      Macular Degeneration No family hx of      Social History     Socioeconomic History     Marital status:      Spouse name: Not on file     Number of children: Not on file     Years of education: Not on file     Highest education level: Not on file   Occupational History     Not on file   Tobacco Use     Smoking status: Former Smoker     Packs/day: 0.50     Years: 7.00     Pack years: 3.50     Types: Cigarettes     Start date: 1972     Quit date: 1979     Years since quittin.6     Smokeless tobacco: Never Used   Substance and Sexual Activity     Alcohol use: Yes     Comment: occasionally     Drug use: No     Sexual activity: Yes     Partners: Female     Birth control/protection: None   Other Topics Concern     Parent/sibling w/ CABG, MI or angioplasty before 65F 55M? No   Social History Narrative     Not on file     Social Determinants of Health     Financial Resource Strain:      Difficulty of Paying Living Expenses:    Food Insecurity:      Worried About Running Out of Food in the Last Year:      Ran Out of  Food in the Last Year:    Transportation Needs:      Lack of Transportation (Medical):      Lack of Transportation (Non-Medical):    Physical Activity:      Days of Exercise per Week:      Minutes of Exercise per Session:    Stress:      Feeling of Stress :    Social Connections:      Frequency of Communication with Friends and Family:      Frequency of Social Gatherings with Friends and Family:      Attends Denominational Services:      Active Member of Clubs or Organizations:      Attends Club or Organization Meetings:      Marital Status:    Intimate Partner Violence:      Fear of Current or Ex-Partner:      Emotionally Abused:      Physically Abused:      Sexually Abused:        REVIEW OF SYSTEMS  =================  C: NEGATIVE for fever, chills, change in weight  I: NEGATIVE for worrisome rashes, moles or lesions  E/M: NEGATIVE for ear, mouth and throat problems  R: NEGATIVE for significant cough or SHORTNESS OF BREATH  CV:  NEGATIVE for chest pain, palpitations or peripheral edema  GI: NEGATIVE for nausea, abdominal pain, heartburn, or change in bowel habits  NEURO: NEGATIVE numbness/weakness  : see HPI  PSYCH: NEGATIVE depression/anxiety  LYmph: no new enlarged lymph nodes  Ortho: no new trauma/movements    Physical Exam:  /77 (BP Location: Left arm, Patient Position: Sitting, Cuff Size: Adult Regular)   Pulse 60   SpO2 98%    Patient is pleasant, in no acute distress, good general condition.  Lung: no evidence of respiratory distress    Abdomen: Soft, nondistended, non tender. No masses. No rebound or guarding.   Exam: bladder scan < 40 ml  Skin: Warm and dry.  No redness.  Psych: normal mood and affect  Neuro: alert and oriented  Musculaskeletal: moving all extremities    Assessment/Plan:   (N40.1) Benign prostatic hyperplasia with lower urinary tract symptoms, symptom details unspecified  (primary encounter diagnosis)  Comment: doing well post op  Plan: MEASURE POST-VOID RESIDUAL URINE/BLADDER          CAPACITY, US NON-IMAGING        F/u prn.

## 2021-08-23 NOTE — PROGRESS NOTES
Bladder Scan performed. 48ml maximum residual urine detected after 3 scans. MD informed.    Carrol RESTREPO RN Specialty Triage 8/23/2021 8:37 AM

## 2021-08-31 ENCOUNTER — MYC MEDICAL ADVICE (OUTPATIENT)
Dept: INTERNAL MEDICINE | Facility: CLINIC | Age: 71
End: 2021-08-31

## 2021-08-31 DIAGNOSIS — N52.9 ERECTILE DYSFUNCTION OF ORGANIC ORIGIN: ICD-10-CM

## 2021-08-31 RX ORDER — SILDENAFIL CITRATE 20 MG/1
TABLET ORAL
Qty: 30 TABLET | Refills: 5 | Status: SHIPPED | OUTPATIENT
Start: 2021-08-31 | End: 2022-10-11

## 2021-08-31 NOTE — TELEPHONE ENCOUNTER
Sildenafil      Last Written Prescription Date:  8/3/20  Last Fill Quantity: 30,   # refills: 5  Last Office Visit: 7/22/21  Future Office visit:       Routing refill request to provider for review/approval because:  Drug not on the G, P or [facility] refill protocol or controlled substance

## 2021-09-13 ENCOUNTER — VIRTUAL VISIT (OUTPATIENT)
Dept: PHARMACY | Facility: CLINIC | Age: 71
End: 2021-09-13
Payer: COMMERCIAL

## 2021-09-13 DIAGNOSIS — J30.1 HAYFEVER: ICD-10-CM

## 2021-09-13 DIAGNOSIS — E11.42 TYPE 2 DIABETES MELLITUS WITH DIABETIC POLYNEUROPATHY, WITH LONG-TERM CURRENT USE OF INSULIN (H): Primary | ICD-10-CM

## 2021-09-13 DIAGNOSIS — N52.9 ERECTILE DYSFUNCTION OF ORGANIC ORIGIN: ICD-10-CM

## 2021-09-13 DIAGNOSIS — M17.0 OSTEOARTHRITIS OF BOTH KNEES, UNSPECIFIED OSTEOARTHRITIS TYPE: ICD-10-CM

## 2021-09-13 DIAGNOSIS — Z79.4 TYPE 2 DIABETES MELLITUS WITH DIABETIC POLYNEUROPATHY, WITH LONG-TERM CURRENT USE OF INSULIN (H): Primary | ICD-10-CM

## 2021-09-13 DIAGNOSIS — G47.00 INSOMNIA, UNSPECIFIED TYPE: ICD-10-CM

## 2021-09-13 DIAGNOSIS — R80.9 MICROALBUMINURIA: ICD-10-CM

## 2021-09-13 DIAGNOSIS — E78.5 HYPERLIPIDEMIA LDL GOAL <100: ICD-10-CM

## 2021-09-13 PROCEDURE — 99607 MTMS BY PHARM ADDL 15 MIN: CPT | Performed by: PHARMACIST

## 2021-09-13 PROCEDURE — 99606 MTMS BY PHARM EST 15 MIN: CPT | Performed by: PHARMACIST

## 2021-09-13 RX ORDER — LISINOPRIL 2.5 MG/1
TABLET ORAL
Qty: 90 TABLET | Refills: 2 | Status: SHIPPED | OUTPATIENT
Start: 2021-09-13 | End: 2022-04-13

## 2021-09-13 NOTE — PATIENT INSTRUCTIONS
Recommendations from today's MTM visit:                                                       1. Updated lisinopril prescription how you are taking it - 2.5 mg once daily.    Follow-up: Return in about 3 months (around 12/13/2021) for Medication Therapy Management.    It was great to speak with you today.  I value your experience and would be very thankful for your time with providing feedback on our clinic survey. You may receive a survey via email or text message in the next few days.     To schedule another MTM appointment, please call the clinic directly or you may call the MTM scheduling line at [phone] or toll-free at [phone].     My Clinical Pharmacist's contact information:                                                      Please feel free to contact me with any questions or concerns you have.      Anna Su, PharmD  Medication Therapy Management Pharmacist  [phone]

## 2021-09-13 NOTE — Clinical Note
ELLA ZEPEDA note, thanks!    Anna Su, PharmD  Medication Therapy Management Pharmacist  [phone]

## 2021-09-13 NOTE — PROGRESS NOTES
Medication Therapy Management (MTM) Encounter    ASSESSMENT:                            Medication Adherence/Access: No issues identified    Diabetes: Stable.  A1C at goal < 7%.     Microalbuminuria:  Stable.    Hyperlipidemia: Stable. Pt is on moderate intensity statin which is indicated based on 2018 ACC/AHA guidelines for lipid management.      Insomnia: Stable.    ED/BPH:  Plan in place.     Bursitis, left shoulder: Stable.    Allergies:  Stable.     Spot on Lung: Stable.    PLAN:                            1. Updated lisinopril prescription how you are taking it - 2.5 mg once daily.    Follow-up: Return in about 3 months (around 12/13/2021) for Medication Therapy Management.      SUBJECTIVE/OBJECTIVE:                          Felipe Campbell is a 71 year old male called for a follow-up visit. He was referred to me from Curt Schneider.  Today's visit is a follow-up MTM visit from 2/22/21.     Reason for visit: med review.    Allergies/ADRs: Reviewed in chart  Past Medical History: Reviewed in chart  Tobacco: He reports that he quit smoking about 42 years ago. His smoking use included cigarettes. He started smoking about 49 years ago. He has a 3.50 pack-year smoking history. He has never used smokeless tobacco.  Alcohol: Less than 1 beverages / week  Caffeine: 2 cups/day of coffee  Activity: donya chi almost every morning, Y once weekly for pilate, strengthening program.    Medication Adherence/Access:  No concerns  The patient fills medications at [facility]: NO, fills medications at [facility].    Diabetes:    metformin 1000mg BID  Lantus 6 units daily  Novolog 4 units TID with meals (generally 1-2 unit per carb choice plus one additional unit).     Pt is not experiencing side effects.    SMBG: TID, generally before meals, sometimes additional 2 hours post meal.   Ranges (per patient): , 2 hours after meal ~110.   Symptoms of low blood sugar? none. Frequency of hypoglycemia? None.  He does have glucagon  available if needed, and his wife is familiar with administration.  He was having some overnight hypoglycemia episodes in the past when he was taking 10 units of Lantus, none since dose was reduced.  Recent symptoms of high blood sugar? none  Eye exam: up to date  Foot exam: up to date  Microalbumin is not < 30 mg/g. Pt is taking an ACEi/ARB.  Aspirin: Taking 81mg daily and denies side effects  Diet/Exercise:  Has increased activity a lot over the last year.  Lab Results   Component Value Date    A1C 6.4 07/06/2021    A1C 7.1 02/15/2021    A1C 6.3 07/29/2020    A1C 6.7 01/13/2020    A1C 6.4 07/09/2019     Microalbuminuria:    lisinopril 2.5 mg daily (although prescription says take two daily)    He denies side effects of therapy.  Lab Results   Component Value Date    UMALCR 384.93 (H) 02/15/2021     BP Readings from Last 3 Encounters:   08/23/21 124/77   07/22/21 123/72   07/06/21 116/72     Hyperlipidemia:   pravastatin 80mg once daily    Pt reports no significant myalgias or other side effects.   Recent Labs   Lab Test 02/15/21  0732 01/13/20  0802 06/15/15  0741 03/14/14  0726   CHOL 161 194 156 213*   HDL 62 62 58 44   LDL 71 102* 75 119   TRIG 139 152* 115 248*   CHOLHDLRATIO  --   --  2.7 4.9     Insomnia:   Melatonin 1-2 mg at bedtime PRN.    Has trouble falling asleep on occassion, finds this effective. Denies side effects.     ED/BPH:    sildenafil 40-80mg as needed    Works well, denies side effects.   Follows with Dr. Steiner.  History of tamsulosin. Had a procedure on his prostate this year, no longer on the tamsulosin.    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  Finds these effective.  He denies side effects.  He's aware of maximum APAP dosing/day. He has history of steroid injections, continues with PT exercises.     Allergies:    Zyrtec 10mg daily PRN seasonally     which he finds effective.  He denies side  effects.    Spot on Lung:    He had a CAT scan this year, revealed a spot on his lungs, repeat CAT scan showed no change, no concern for cancer at this time, it will be monitored.     GFR Estimate   Date Value Ref Range Status   07/06/2021 82 >60 mL/min/[1.73_m2] Final     Comment:     Non  GFR Calc  Starting 12/18/2018, serum creatinine based estimated GFR (eGFR) will be   calculated using the Chronic Kidney Disease Epidemiology Collaboration   (CKD-EPI) equation.     02/15/2021 66 >60 mL/min/[1.73_m2] Final     Comment:     Non  GFR Calc  Starting 12/18/2018, serum creatinine based estimated GFR (eGFR) will be   calculated using the Chronic Kidney Disease Epidemiology Collaboration   (CKD-EPI) equation.     07/29/2020 70 >60 mL/min/[1.73_m2] Final     Comment:     Non  GFR Calc  Starting 12/18/2018, serum creatinine based estimated GFR (eGFR) will be   calculated using the Chronic Kidney Disease Epidemiology Collaboration   (CKD-EPI) equation.       Today's Vitals: There were no vitals taken for this visit.  ----------------      I spent 25 minutes with this patient today. All changes were made via collaborative practice agreement with Curt Schneider MD. A copy of the visit note was provided to the patient's primary care provider.    The patient was sent via "[employer]" a summary of these recommendations.     Anna Su, PharmD  Medication Therapy Management Pharmacist  [phone]    Telemedicine Visit Details  Type of service:  Telephone visit  Start Time: 11:32 AM  End Time: 11:57 AM  Originating Location (patient location): Home  Distant Location (provider location):  [facility]     Medication Therapy Recommendations  Microalbuminuria    Current Medication: lisinopril (ZESTRIL) 2.5 MG tablet (Discontinued)   Rationale: Frequency inappropriate - Dosage too high - Safety   Recommendation: Decrease Dose - lisinopril 2.5 MG tablet - updated rx with  correct instructions - 2.5 mg daily   Status: Accepted per CPA

## 2021-09-15 DIAGNOSIS — E11.42 TYPE 2 DIABETES MELLITUS WITH DIABETIC POLYNEUROPATHY, WITH LONG-TERM CURRENT USE OF INSULIN (H): ICD-10-CM

## 2021-09-15 DIAGNOSIS — Z79.4 TYPE 2 DIABETES MELLITUS WITH DIABETIC POLYNEUROPATHY, WITH LONG-TERM CURRENT USE OF INSULIN (H): ICD-10-CM

## 2021-09-17 RX ORDER — FLURBIPROFEN SODIUM 0.3 MG/ML
SOLUTION/ DROPS OPHTHALMIC
Qty: 400 EACH | Refills: 0 | Status: SHIPPED | OUTPATIENT
Start: 2021-09-17 | End: 2022-01-23

## 2021-09-20 ENCOUNTER — MYC MEDICAL ADVICE (OUTPATIENT)
Dept: INTERNAL MEDICINE | Facility: CLINIC | Age: 71
End: 2021-09-20

## 2021-09-20 DIAGNOSIS — Z71.84 COUNSELING ABOUT TRAVEL: Primary | ICD-10-CM

## 2021-09-26 ENCOUNTER — HEALTH MAINTENANCE LETTER (OUTPATIENT)
Age: 71
End: 2021-09-26

## 2021-11-02 DIAGNOSIS — E78.5 HYPERLIPIDEMIA LDL GOAL <100: ICD-10-CM

## 2021-11-03 RX ORDER — PRAVASTATIN SODIUM 80 MG/1
TABLET ORAL
Qty: 90 TABLET | Refills: 0 | Status: SHIPPED | OUTPATIENT
Start: 2021-11-03 | End: 2022-01-28

## 2021-11-03 NOTE — TELEPHONE ENCOUNTER
Prescription approved per [facility] Refill Protocol.    Alisson Clemens RN  [facility]

## 2021-11-05 DIAGNOSIS — E11.42 TYPE 2 DIABETES MELLITUS WITH DIABETIC POLYNEUROPATHY, WITH LONG-TERM CURRENT USE OF INSULIN (H): ICD-10-CM

## 2021-11-05 DIAGNOSIS — Z79.4 TYPE 2 DIABETES MELLITUS WITH DIABETIC POLYNEUROPATHY, WITH LONG-TERM CURRENT USE OF INSULIN (H): ICD-10-CM

## 2021-11-08 RX ORDER — INSULIN GLARGINE 100 [IU]/ML
INJECTION, SOLUTION SUBCUTANEOUS
Qty: 15 ML | Refills: 0 | Status: SHIPPED | OUTPATIENT
Start: 2021-11-08 | End: 2022-04-13

## 2021-11-08 NOTE — TELEPHONE ENCOUNTER
Prescription approved per [facility] Refill Protocol.    Fay Reyna RN

## 2021-11-21 ENCOUNTER — HEALTH MAINTENANCE LETTER (OUTPATIENT)
Age: 71
End: 2021-11-21

## 2021-11-21 ENCOUNTER — MYC MEDICAL ADVICE (OUTPATIENT)
Dept: INTERNAL MEDICINE | Facility: CLINIC | Age: 71
End: 2021-11-21
Payer: COMMERCIAL

## 2022-01-14 DIAGNOSIS — Z79.4 TYPE 2 DIABETES MELLITUS WITH DIABETIC POLYNEUROPATHY, WITH LONG-TERM CURRENT USE OF INSULIN (H): ICD-10-CM

## 2022-01-14 DIAGNOSIS — E11.42 TYPE 2 DIABETES MELLITUS WITH DIABETIC POLYNEUROPATHY, WITH LONG-TERM CURRENT USE OF INSULIN (H): ICD-10-CM

## 2022-01-16 NOTE — TELEPHONE ENCOUNTER
Chief Complaint   Patient presents with    Foot Pain     big toe on right foot has been hurting since patient was playing basketball 2 month ago. pt states it hurts to much to bend his right foot now. \"REVIEWED RECORD IN PREPARATION FOR VISIT AND HAVE OBTAINED THE NECESSARY DOCUMENTATION\"  1. Have you been to the ER, urgent care clinic since your last visit? Hospitalized since your last visit? Yes [facility]: [facility]    2. Have you seen or consulted any other health care providers outside of the [address] [place] [address] since your last visit? Include any pap smears or colon screening.  Yes Reason for visit: same Medication is being filled for 1 time refill only due to:  Patient needs to be seen because need appt.

## 2022-01-22 DIAGNOSIS — Z79.4 TYPE 2 DIABETES MELLITUS WITH DIABETIC POLYNEUROPATHY, WITH LONG-TERM CURRENT USE OF INSULIN (H): ICD-10-CM

## 2022-01-22 DIAGNOSIS — E11.42 TYPE 2 DIABETES MELLITUS WITH DIABETIC POLYNEUROPATHY, WITH LONG-TERM CURRENT USE OF INSULIN (H): ICD-10-CM

## 2022-01-23 RX ORDER — FLURBIPROFEN SODIUM 0.3 MG/ML
SOLUTION/ DROPS OPHTHALMIC
Qty: 400 EACH | Refills: 0 | Status: SHIPPED | OUTPATIENT
Start: 2022-01-23 | End: 2022-04-13

## 2022-01-27 DIAGNOSIS — E78.5 HYPERLIPIDEMIA LDL GOAL <100: ICD-10-CM

## 2022-01-28 RX ORDER — PRAVASTATIN SODIUM 80 MG/1
TABLET ORAL
Qty: 90 TABLET | Refills: 0 | Status: SHIPPED | OUTPATIENT
Start: 2022-01-28 | End: 2022-04-13

## 2022-01-28 NOTE — TELEPHONE ENCOUNTER
Prescription approved per [facility] Refill Protocol.    Alisson Clemens RN  [facility]

## 2022-02-03 ENCOUNTER — MYC MEDICAL ADVICE (OUTPATIENT)
Dept: INTERNAL MEDICINE | Facility: CLINIC | Age: 72
End: 2022-02-03

## 2022-02-03 ENCOUNTER — OFFICE VISIT (OUTPATIENT)
Dept: OPHTHALMOLOGY | Facility: CLINIC | Age: 72
End: 2022-02-03
Payer: COMMERCIAL

## 2022-02-03 DIAGNOSIS — Z79.4 TYPE 2 DIABETES MELLITUS WITH DIABETIC POLYNEUROPATHY, WITH LONG-TERM CURRENT USE OF INSULIN (H): ICD-10-CM

## 2022-02-03 DIAGNOSIS — E11.42 TYPE 2 DIABETES MELLITUS WITH DIABETIC POLYNEUROPATHY, WITH LONG-TERM CURRENT USE OF INSULIN (H): Primary | ICD-10-CM

## 2022-02-03 DIAGNOSIS — Z79.4 TYPE 2 DIABETES MELLITUS WITH DIABETIC POLYNEUROPATHY, WITH LONG-TERM CURRENT USE OF INSULIN (H): Primary | ICD-10-CM

## 2022-02-03 DIAGNOSIS — Z01.01 ENCOUNTER FOR EXAMINATION OF EYES AND VISION WITH ABNORMAL FINDINGS: Primary | ICD-10-CM

## 2022-02-03 DIAGNOSIS — R80.9 MICROALBUMINURIA: ICD-10-CM

## 2022-02-03 DIAGNOSIS — E78.5 HYPERLIPIDEMIA LDL GOAL <100: ICD-10-CM

## 2022-02-03 DIAGNOSIS — H43.813 POSTERIOR VITREOUS DETACHMENT OF BOTH EYES: ICD-10-CM

## 2022-02-03 DIAGNOSIS — E11.42 TYPE 2 DIABETES MELLITUS WITH DIABETIC POLYNEUROPATHY, WITH LONG-TERM CURRENT USE OF INSULIN (H): ICD-10-CM

## 2022-02-03 DIAGNOSIS — H25.813 COMBINED FORMS OF AGE-RELATED CATARACT OF BOTH EYES: ICD-10-CM

## 2022-02-03 DIAGNOSIS — H52.4 PRESBYOPIA: ICD-10-CM

## 2022-02-03 PROCEDURE — 92015 DETERMINE REFRACTIVE STATE: CPT | Performed by: OPHTHALMOLOGY

## 2022-02-03 PROCEDURE — 92014 COMPRE OPH EXAM EST PT 1/>: CPT | Performed by: OPHTHALMOLOGY

## 2022-02-03 ASSESSMENT — VISUAL ACUITY
OD_CC: 20/50
OD_CC: J3
OS_CC: J1
OS_PH_CC: 20/25
CORRECTION_TYPE: GLASSES
OS_CC: 20/50
OD_PH_CC: 20/30
METHOD: SNELLEN - LINEAR

## 2022-02-03 ASSESSMENT — REFRACTION_WEARINGRX
OD_AXIS: 136
OD_SPHERE: -6.00
OS_ADD: +2.27
OS_CYLINDER: +0.75
OD_CYLINDER: +1.00
OD_ADD: +2.24
OS_AXIS: 010
OS_SPHERE: -4.25
SPECS_TYPE: PAL

## 2022-02-03 ASSESSMENT — REFRACTION_MANIFEST
OD_SPHERE: -8.00
OD_ADD: +2.75
OD_CYLINDER: +1.00
OS_CYLINDER: +1.00
OS_ADD: +2.75
OS_AXIS: 033
OS_SPHERE: -6.75
OD_AXIS: 138

## 2022-02-03 ASSESSMENT — EXTERNAL EXAM - RIGHT EYE: OD_EXAM: 2+ BROW PTOSIS, MILD TO MOD BROW

## 2022-02-03 ASSESSMENT — CUP TO DISC RATIO
OD_RATIO: 0.3
OS_RATIO: 0.3

## 2022-02-03 ASSESSMENT — TONOMETRY
OS_IOP_MMHG: 18
IOP_METHOD: APPLANATION
OD_IOP_MMHG: 19

## 2022-02-03 ASSESSMENT — SLIT LAMP EXAM - LIDS
COMMENTS: NORMAL
COMMENTS: NORMAL

## 2022-02-03 ASSESSMENT — CONF VISUAL FIELD
OD_NORMAL: 1
OS_NORMAL: 1

## 2022-02-03 ASSESSMENT — EXTERNAL EXAM - LEFT EYE: OS_EXAM: 2+ BROW PTOSIS, MILD TO MOD BROW

## 2022-02-03 NOTE — PATIENT INSTRUCTIONS
"Glasses prescription given - optional   Use artificial tears up to four times a day (Refresh Optive, Systane Balance, TheraTears, or generic artificial tears are ok. Avoid \"get the red out\" drops).  Will call to schedule cataract surgery right eye first, left eye thereafter.  RBAC discussed with patient.     Call in October 2022 for an appointment in February 2023 for Complete Exam    Dr. Monet [phone]     Patient Education   Diabetes weakens the blood vessels all over the body, including the eyes. Damage to the blood vessels in the eyes can cause swelling or bleeding into part of the eye (called the retina). This is called diabetic retinopathy (KAMERON-tin-AH-puh-thee). If not treated, this disease can cause vision loss or blindness.   Symptoms may include blurred or distorted vision, but many people have no symptoms. It's important to see your eye doctor regularly to check for problems.   Early treatment and good control can help protect your vision. Here are the things you can do to help prevent vision loss:      1. Keep your blood sugar levels under tight control.      2. Bring high blood pressure under control.      3. No smoking.      4. Have yearly dilated eye exams.       "

## 2022-02-03 NOTE — PROGRESS NOTES
" Current Eye Medications:  None.      Subjective:  Patient is here for a Diabetic Eye Exam.  He feels his cataracts are getting worse - he is having difficulty distinguishes colors.  Vision in his right eye > left eye, is decreasing especially when looking in the distance.     RH.  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    Lab Results   Component Value Date    A1C 6.4 07/06/2021    A1C 7.1 02/15/2021    A1C 6.3 07/29/2020    A1C 6.7 01/13/2020    A1C 6.4 07/09/2019        Objective:  See Ophthalmology Exam.       Assessment:  Visually significant cataract both eyes.  No diabetic retinopathy.      ICD-10-CM    1. Encounter for examination of eyes and vision with abnormal findings  Z01.01    2. Presbyopia  H52.4    3. Type 2 diabetes mellitus with diabetic polyneuropathy, with long-term current use of insulin (H)  E11.42     Z79.4    4. Combined forms of age-related cataract, mod, of both eyes  H25.813    5. Posterior vitreous detachment of both eyes  H43.813         Plan:  Glasses prescription given - optional   Use artificial tears up to four times a day (Refresh Optive, Systane Balance, TheraTears, or generic artificial tears are ok. Avoid \"get the red out\" drops).    Will call to schedule cataract surgery right eye first, left eye thereafter.  RBAC discussed with patient.   Dr. Monet [phone]        "

## 2022-02-03 NOTE — LETTER
"    2/3/2022         RE: Felipe Campbell  [address]  [place] [address]        Dear Colleague,    Thank you for referring your patient, Felipe Campbell, to the [facility]. Please see a copy of my visit note below.     Current Eye Medications:  None.      Subjective:  Patient is here for a Diabetic Eye Exam.  He feels his cataracts are getting worse - he is having difficulty distinguishes colors.  Vision in his right eye > left eye, is decreasing especially when looking in the distance.     RH.  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    Lab Results   Component Value Date    A1C 6.4 07/06/2021    A1C 7.1 02/15/2021    A1C 6.3 07/29/2020    A1C 6.7 01/13/2020    A1C 6.4 07/09/2019        Objective:  See Ophthalmology Exam.       Assessment:  Visually significant cataract both eyes.  No diabetic retinopathy.      ICD-10-CM    1. Encounter for examination of eyes and vision with abnormal findings  Z01.01    2. Presbyopia  H52.4    3. Type 2 diabetes mellitus with diabetic polyneuropathy, with long-term current use of insulin (H)  E11.42     Z79.4    4. Combined forms of age-related cataract, mod, of both eyes  H25.813    5. Posterior vitreous detachment of both eyes  H43.813         Plan:  Glasses prescription given - optional   Use artificial tears up to four times a day (Refresh Optive, Systane Balance, TheraTears, or generic artificial tears are ok. Avoid \"get the red out\" drops).    Will call to schedule cataract surgery right eye first, left eye thereafter.  RBAC discussed with patient.   Dr. Monet [phone]            Again, thank you for allowing me to participate in the care of your " patient.        Sincerely,        Sonny Monet MD

## 2022-02-04 PROBLEM — H25.813 COMBINED FORMS OF AGE-RELATED CATARACT OF BOTH EYES: Status: ACTIVE | Noted: 2019-10-28

## 2022-02-15 ENCOUNTER — TELEPHONE (OUTPATIENT)
Dept: OPHTHALMOLOGY | Facility: CLINIC | Age: 72
End: 2022-02-15
Payer: COMMERCIAL

## 2022-02-16 ENCOUNTER — LAB (OUTPATIENT)
Dept: LAB | Facility: CLINIC | Age: 72
End: 2022-02-16
Payer: COMMERCIAL

## 2022-02-16 DIAGNOSIS — E78.5 HYPERLIPIDEMIA LDL GOAL <100: ICD-10-CM

## 2022-02-16 DIAGNOSIS — E11.42 TYPE 2 DIABETES MELLITUS WITH DIABETIC POLYNEUROPATHY, WITH LONG-TERM CURRENT USE OF INSULIN (H): ICD-10-CM

## 2022-02-16 DIAGNOSIS — R80.9 MICROALBUMINURIA: ICD-10-CM

## 2022-02-16 DIAGNOSIS — Z79.4 TYPE 2 DIABETES MELLITUS WITH DIABETIC POLYNEUROPATHY, WITH LONG-TERM CURRENT USE OF INSULIN (H): ICD-10-CM

## 2022-02-16 LAB
CHOLEST SERPL-MCNC: 177 MG/DL
CREAT UR-MCNC: 81 MG/DL
FASTING STATUS PATIENT QL REPORTED: YES
HBA1C MFR BLD: 6.7 % (ref 0–5.6)
HDLC SERPL-MCNC: 62 MG/DL
LDLC SERPL CALC-MCNC: 88 MG/DL
MICROALBUMIN UR-MCNC: 31 MG/L
MICROALBUMIN/CREAT UR: 38.27 MG/G CR (ref 0–17)
NONHDLC SERPL-MCNC: 115 MG/DL
TRIGL SERPL-MCNC: 136 MG/DL

## 2022-02-16 PROCEDURE — 80061 LIPID PANEL: CPT

## 2022-02-16 PROCEDURE — 82043 UR ALBUMIN QUANTITATIVE: CPT

## 2022-02-16 PROCEDURE — 83036 HEMOGLOBIN GLYCOSYLATED A1C: CPT

## 2022-02-16 PROCEDURE — 36415 COLL VENOUS BLD VENIPUNCTURE: CPT

## 2022-02-18 ENCOUNTER — OFFICE VISIT (OUTPATIENT)
Dept: INTERNAL MEDICINE | Facility: CLINIC | Age: 72
End: 2022-02-18
Payer: COMMERCIAL

## 2022-02-18 VITALS
WEIGHT: 206.6 LBS | RESPIRATION RATE: 24 BRPM | SYSTOLIC BLOOD PRESSURE: 114 MMHG | TEMPERATURE: 97.9 F | OXYGEN SATURATION: 98 % | HEIGHT: 78 IN | BODY MASS INDEX: 23.9 KG/M2 | HEART RATE: 72 BPM | DIASTOLIC BLOOD PRESSURE: 71 MMHG

## 2022-02-18 DIAGNOSIS — E11.42 TYPE 2 DIABETES MELLITUS WITH DIABETIC POLYNEUROPATHY, WITH LONG-TERM CURRENT USE OF INSULIN (H): Primary | ICD-10-CM

## 2022-02-18 DIAGNOSIS — N18.2 CKD (CHRONIC KIDNEY DISEASE) STAGE 2, GFR 60-89 ML/MIN: ICD-10-CM

## 2022-02-18 DIAGNOSIS — E78.5 HYPERLIPIDEMIA LDL GOAL <100: ICD-10-CM

## 2022-02-18 DIAGNOSIS — R91.8 PULMONARY NODULES: ICD-10-CM

## 2022-02-18 DIAGNOSIS — R80.9 MICROALBUMINURIA: ICD-10-CM

## 2022-02-18 DIAGNOSIS — Z79.4 TYPE 2 DIABETES MELLITUS WITH DIABETIC POLYNEUROPATHY, WITH LONG-TERM CURRENT USE OF INSULIN (H): Primary | ICD-10-CM

## 2022-02-18 PROCEDURE — 99214 OFFICE O/P EST MOD 30 MIN: CPT | Performed by: INTERNAL MEDICINE

## 2022-02-18 ASSESSMENT — PAIN SCALES - GENERAL: PAINLEVEL: NO PAIN (0)

## 2022-02-18 NOTE — PROGRESS NOTES
Assessment & Plan     Type 2 diabetes mellitus with diabetic polyneuropathy, with long-term current use of insulin (H)  This is a patient with chronic diabetes mellitus type 2  And treated with insulin. He completed pre-clinic laboratory studies and these are reviewed today. As is further detailed below. This patient is current with diabetes mellitus benchmarks and is well controlled in every parameter we track. Refills provided, refills provided good for six months     Hyperlipidemia LDL goal <100  Continue current plan of care  , see medication list and laboratory results      Pulmonary nodules  A separate matter, he has a pulmonary nodule that is appropriate for a follow up chest CT scan. See official overread of the film per radiology of film form last summer  - CT Chest w Contrast; Future    CKD (chronic kidney disease) stage 2, GFR 60-89 ml/min  Stable phase of chronic illness     Microalbuminuria  Stable phase of chronic illness       Review of the result(s) of each unique test - see labs from last week  Prescription drug management  24 minutes spent on the date of the encounter doing chart review, history and exam, documentation and further activities per the note      Return in about 6 months (around 8/18/2022).    Curt Schneider MD  [facility] ANA ROSA Jaquez is a 71 year old who presents for the following health issues   Encounter Diagnoses   Name Primary?     Type 2 diabetes mellitus with diabetic polyneuropathy, with long-term current use of insulin (H) Yes     Hyperlipidemia LDL goal <100      Pulmonary nodules      CKD (chronic kidney disease) stage 2, GFR 60-89 ml/min      Microalbuminuria        History of Present Illness       CKD: He uses over the counter pain medication, including Acetominophin, a few times a week.    Diabetes:   He presents for follow up of diabetes.  He is checking home blood glucose three times daily. He checks blood glucose before meals and before  and after meals.  Blood glucose is sometimes over 200 and never under 70. He is aware of hypoglycemia symptoms including shakiness. He has no concerns regarding his diabetes at this time.  He is having numbness in feet.         Hyperlipidemia:  He presents for follow up of hyperlipidemia.  He is taking medication to lower cholesterol. He is not having myalgia or other side effects to statin medications.    He eats 2-3 servings of fruits and vegetables daily.He consumes 0 sweetened beverage(s) daily.He exercises with enough effort to increase his heart rate 20 to 29 minutes per day.  He exercises with enough effort to increase his heart rate 3 or less days per week.   He is taking medications regularly.     Lab Results   Component Value Date    A1C 6.7 02/16/2022    A1C 6.4 07/06/2021    A1C 7.1 02/15/2021    A1C 6.3 07/29/2020    A1C 6.7 01/13/2020    A1C 6.4 07/09/2019     BP Readings from Last 6 Encounters:   02/18/22 114/71   08/23/21 124/77   07/22/21 123/72   07/06/21 116/72   06/04/21 133/77   05/03/21 131/61     Wt Readings from Last 5 Encounters:   02/18/22 93.7 kg (206 lb 9.6 oz)   07/22/21 92.3 kg (203 lb 9.3 oz)   07/06/21 93.6 kg (206 lb 6.4 oz)   02/22/21 93.9 kg (207 lb)   08/03/20 92.8 kg (204 lb 9.6 oz)     Body mass index is 21.19 kg/m .    Annual eye exams is current , pending a cataract extraction within the next few months   Has a chronic Self foot examinations are emphasized   With physical activity - patient is well aware of the issues and diagnosis and need for this. He knows the goals and does well with biking and many outdoor activities   Immunizations ?  Lab Results   Component Value Date    A1C 6.7 02/16/2022    A1C 6.4 07/06/2021    A1C 7.1 02/15/2021    A1C 6.3 07/29/2020    A1C 6.7 01/13/2020    A1C 6.4 07/09/2019     GFR Estimate   Date Value Ref Range Status   07/06/2021 82 >60 mL/min/[1.73_m2] Final     Comment:     Non  GFR Calc  Starting 12/18/2018, serum creatinine  "based estimated GFR (eGFR) will be   calculated using the Chronic Kidney Disease Epidemiology Collaboration   (CKD-EPI) equation.     02/15/2021 66 >60 mL/min/[1.73_m2] Final     Comment:     Non  GFR Calc  Starting 12/18/2018, serum creatinine based estimated GFR (eGFR) will be   calculated using the Chronic Kidney Disease Epidemiology Collaboration   (CKD-EPI) equation.     07/29/2020 70 >60 mL/min/[1.73_m2] Final     Comment:     Non  GFR Calc  Starting 12/18/2018, serum creatinine based estimated GFR (eGFR) will be   calculated using the Chronic Kidney Disease Epidemiology Collaboration   (CKD-EPI) equation.       Lab Results   Component Value Date    CHOL 177 02/16/2022    CHOL 161 02/15/2021     Lab Results   Component Value Date    HDL 62 02/16/2022    HDL 62 02/15/2021     Lab Results   Component Value Date    LDL 88 02/16/2022    LDL 71 02/15/2021     Lab Results   Component Value Date    TRIG 136 02/16/2022    TRIG 139 02/15/2021     Lab Results   Component Value Date    CHOLHDLRATIO 2.7 06/15/2015     Lab Results   Component Value Date    MICROL 31 02/16/2022    MICROL 286 02/15/2021     No results found for: MICROALBUMIN      Review of Systems         Objective    /71 (BP Location: Right arm, Patient Position: Sitting, Cuff Size: Adult Large)   Pulse 72   Temp 97.9  F (36.6  C) (Oral)   Resp 24   Ht 2.103 m (6' 10.8\")   Wt 93.7 kg (206 lb 9.6 oz)   SpO2 98%   BMI 21.19 kg/m    Body mass index is 21.19 kg/m .  Physical Exam   GENERAL: healthy, alert and no distress  RESP: lungs clear to auscultation - no rales, rhonchi or wheezes  CV: regular rate and rhythm, normal S1 S2, no S3 or S4, no murmur, click or rub, no peripheral edema and peripheral pulses strong  MS: no gross musculoskeletal defects noted, no edema  NEURO: Normal strength and tone, mentation intact and speech normal  PSYCH: mentation appears normal, affect normal/bright    "

## 2022-02-19 ENCOUNTER — PREP FOR PROCEDURE (OUTPATIENT)
Dept: OPHTHALMOLOGY | Facility: CLINIC | Age: 72
End: 2022-02-19
Payer: COMMERCIAL

## 2022-02-19 DIAGNOSIS — H25.811 COMBINED FORM OF AGE-RELATED CATARACT, RIGHT EYE: Primary | ICD-10-CM

## 2022-02-19 DIAGNOSIS — H25.812 COMBINED FORM OF AGE-RELATED CATARACT, LEFT EYE: ICD-10-CM

## 2022-02-21 ENCOUNTER — HOSPITAL ENCOUNTER (OUTPATIENT)
Facility: AMBULATORY SURGERY CENTER | Age: 72
End: 2022-02-21
Attending: STUDENT IN AN ORGANIZED HEALTH CARE EDUCATION/TRAINING PROGRAM | Admitting: STUDENT IN AN ORGANIZED HEALTH CARE EDUCATION/TRAINING PROGRAM
Payer: COMMERCIAL

## 2022-02-21 ENCOUNTER — TELEPHONE (OUTPATIENT)
Dept: OPHTHALMOLOGY | Facility: CLINIC | Age: 72
End: 2022-02-21
Payer: COMMERCIAL

## 2022-02-22 ENCOUNTER — ANCILLARY PROCEDURE (OUTPATIENT)
Dept: CT IMAGING | Facility: CLINIC | Age: 72
End: 2022-02-22
Attending: INTERNAL MEDICINE
Payer: COMMERCIAL

## 2022-02-22 DIAGNOSIS — R91.8 PULMONARY NODULES: ICD-10-CM

## 2022-02-22 PROCEDURE — 71260 CT THORAX DX C+: CPT | Mod: TC | Performed by: RADIOLOGY

## 2022-02-22 RX ORDER — IOPAMIDOL 755 MG/ML
80 INJECTION, SOLUTION INTRAVASCULAR ONCE
Status: COMPLETED | OUTPATIENT
Start: 2022-02-22 | End: 2022-02-22

## 2022-02-22 RX ADMIN — IOPAMIDOL 80 ML: 755 INJECTION, SOLUTION INTRAVASCULAR at 14:38

## 2022-02-23 DIAGNOSIS — Z79.4 TYPE 2 DIABETES MELLITUS WITH DIABETIC POLYNEUROPATHY, WITH LONG-TERM CURRENT USE OF INSULIN (H): ICD-10-CM

## 2022-02-23 DIAGNOSIS — E11.42 TYPE 2 DIABETES MELLITUS WITH DIABETIC POLYNEUROPATHY, WITH LONG-TERM CURRENT USE OF INSULIN (H): ICD-10-CM

## 2022-02-24 RX ORDER — BLOOD SUGAR DIAGNOSTIC
STRIP MISCELLANEOUS
Qty: 400 STRIP | Refills: 0 | Status: SHIPPED | OUTPATIENT
Start: 2022-02-24 | End: 2022-07-27

## 2022-03-07 NOTE — TELEPHONE ENCOUNTER
Type of surgery: Left phacoemulsification cataract with intraocular lens implant left   CPT 93324     Combined forms of age-related cataract, mod, of both eyes H25.813    Location of surgery: [facility]  Date and time of surgery: 05/09/2022  Surgeon: Mendel  Pre-Op Appt Date: 04/21/2022  Post-Op Appt Date: 05/10/2022   Packet sent out: Yes  Pre-cert/Authorization completed:  No prior auth needed per [employer] online list.    Date: 3/7/22    Dee Dee Mims  Prior Authorization Dept  [phone]

## 2022-03-07 NOTE — TELEPHONE ENCOUNTER
Type of surgery: Right phacoemulsification cataract with intraocular lens implant right   CPT 26425   Combined forms of age-related cataract, mod, of both eyes H25.813     Location of surgery: [facility]  Date and time of surgery: 04/25/2022  Surgeon: Mendel  Pre-Op Appt Date: 04/21/2022  Post-Op Appt Date: 04/26/2022   Packet sent out: Yes  Pre-cert/Authorization completed:  No prior auth needed per [facility] online list.    Date: 3/7/22    Dee Dee Mims  Prior Authorization Dept  [phone]

## 2022-03-12 DIAGNOSIS — Z11.59 ENCOUNTER FOR SCREENING FOR OTHER VIRAL DISEASES: Primary | ICD-10-CM

## 2022-03-25 DIAGNOSIS — Z11.59 ENCOUNTER FOR SCREENING FOR OTHER VIRAL DISEASES: Primary | ICD-10-CM

## 2022-04-13 ENCOUNTER — VIRTUAL VISIT (OUTPATIENT)
Dept: PHARMACY | Facility: CLINIC | Age: 72
End: 2022-04-13
Payer: COMMERCIAL

## 2022-04-13 DIAGNOSIS — E11.42 TYPE 2 DIABETES MELLITUS WITH DIABETIC POLYNEUROPATHY, WITH LONG-TERM CURRENT USE OF INSULIN (H): Primary | ICD-10-CM

## 2022-04-13 DIAGNOSIS — N18.2 CKD (CHRONIC KIDNEY DISEASE) STAGE 2, GFR 60-89 ML/MIN: ICD-10-CM

## 2022-04-13 DIAGNOSIS — J30.1 HAYFEVER: ICD-10-CM

## 2022-04-13 DIAGNOSIS — Z79.4 TYPE 2 DIABETES MELLITUS WITH DIABETIC POLYNEUROPATHY, WITH LONG-TERM CURRENT USE OF INSULIN (H): Primary | ICD-10-CM

## 2022-04-13 DIAGNOSIS — R80.9 MICROALBUMINURIA: ICD-10-CM

## 2022-04-13 DIAGNOSIS — G47.00 INSOMNIA, UNSPECIFIED TYPE: ICD-10-CM

## 2022-04-13 DIAGNOSIS — M17.0 OSTEOARTHRITIS OF BOTH KNEES, UNSPECIFIED OSTEOARTHRITIS TYPE: ICD-10-CM

## 2022-04-13 DIAGNOSIS — E78.5 HYPERLIPIDEMIA LDL GOAL <100: ICD-10-CM

## 2022-04-13 DIAGNOSIS — N52.9 ERECTILE DYSFUNCTION OF ORGANIC ORIGIN: ICD-10-CM

## 2022-04-13 PROCEDURE — 99605 MTMS BY PHARM NP 15 MIN: CPT | Performed by: PHARMACIST

## 2022-04-13 RX ORDER — INSULIN GLARGINE 100 [IU]/ML
INJECTION, SOLUTION SUBCUTANEOUS
Qty: 15 ML | Refills: 1 | Status: SHIPPED | OUTPATIENT
Start: 2022-04-13 | End: 2023-04-06

## 2022-04-13 RX ORDER — FLURBIPROFEN SODIUM 0.3 MG/ML
SOLUTION/ DROPS OPHTHALMIC
Qty: 400 EACH | Refills: 3 | Status: SHIPPED | OUTPATIENT
Start: 2022-04-13 | End: 2023-04-06

## 2022-04-13 RX ORDER — LISINOPRIL 2.5 MG/1
TABLET ORAL
Qty: 90 TABLET | Refills: 3 | Status: SHIPPED | OUTPATIENT
Start: 2022-04-13 | End: 2023-04-06

## 2022-04-13 RX ORDER — INSULIN ASPART 100 [IU]/ML
INJECTION, SOLUTION INTRAVENOUS; SUBCUTANEOUS
Qty: 45 ML | Refills: 1 | Status: SHIPPED | OUTPATIENT
Start: 2022-04-13 | End: 2023-04-06

## 2022-04-13 RX ORDER — PRAVASTATIN SODIUM 80 MG/1
80 TABLET ORAL DAILY
Qty: 90 TABLET | Refills: 3 | Status: SHIPPED | OUTPATIENT
Start: 2022-04-13 | End: 2023-04-06

## 2022-04-13 NOTE — PROGRESS NOTES
Medication Therapy Management (MTM) Encounter    ASSESSMENT:                            Medication Adherence/Access: No issues identified    Diabetes: Stable.  A1C at goal < 7%. Did mention there are other options we can consider other than insulin, he'll think about this, prefers to maintain current therapy right now.    CKD-Stage 2: Stable. Blood pressure at goal < 140/90.    Hyperlipidemia: Stable. Pt is on moderate intensity statin which is indicated based on 2018 ACC/AHA guidelines for lipid management.      Insomnia: Stable.    ED/BPH:  Stable.     Bursitis, left shoulder: Stable.    Allergies:  Stable.     Spot on Lung: Stable, per patient.     PLAN:                            1. Continue current medications.    Follow-up: Return in about 6 months (around 10/13/2022) for Medication Therapy Management.    SUBJECTIVE/OBJECTIVE:                          Felipe Campbell is a 71 year old male called for a follow-up visit.  Today's visit is a follow-up MTM visit from 9/13/21.     Reason for visit: med review.    Allergies/ADRs: Reviewed in chart  Past Medical History: Reviewed in chart  Tobacco: He reports that he quit smoking about 43 years ago. His smoking use included cigarettes. He started smoking about 49 years ago. He has a 3.50 pack-year smoking history. He has never used smokeless tobacco.  Alcohol: Less than 1 beverages / week  Caffeine: 2 cups/day of coffee  Activity: donya chi almost every morning, Y once weekly for pilate, strengthening program.    Medication Adherence/Access:  No concerns  The patient fills medications at [facility]: NO, fills medications at [facility].    Diabetes:    metformin 1000mg BID  Lantus 6 units daily  Novolog 4 units TID with meals (generally 1-2 unit per carb choice plus one additional unit).     He doesn't mind the insulin, but does comment that he does have to be conscious of of temperature and supply when travels, which he does a lot of.  Pt is not experiencing side effects.     SMB-5x/day generally before meals, sometimes 2 hours post meal.   Ranges (per patient): 100-120, 2 hours after meal 140-180.   Symptoms of low blood sugar? none. Frequency of hypoglycemia? None.  He does have glucagon available if needed, and his wife is familiar with administration.  He was having some overnight hypoglycemia episodes in the past when he was taking 10 units of Lantus, none since dose was reduced. Also keeps glucose tablets on hand.  Recent symptoms of high blood sugar? none  Eye exam: up to date  Foot exam: up to date  Microalbumin is not < 30 mg/g. Pt is taking an ACEi/ARB.  Aspirin: Taking 81mg daily and denies side effects  Diet/Exercise:  Has increased activity a lot over the last year.  Lab Results   Component Value Date    UMALCR 38.27 (H) 2022     Lab Results   Component Value Date    A1C 6.7 2022    A1C 6.4 2021    A1C 7.1 02/15/2021    A1C 6.3 2020    A1C 6.7 2020    A1C 6.4 2019     CKD-Stage 2:    lisinopril 2.5 mg daily  He denies side effects of therapy.    BP Readings from Last 3 Encounters:   22 114/71   21 124/77   21 123/72     GFR Estimate   Date Value Ref Range Status   2021 82 >60 mL/min/[1.73_m2] Final     Comment:     Non  GFR Calc  Starting 2018, serum creatinine based estimated GFR (eGFR) will be   calculated using the Chronic Kidney Disease Epidemiology Collaboration   (CKD-EPI) equation.     02/15/2021 66 >60 mL/min/[1.73_m2] Final     Comment:     Non  GFR Calc  Starting 2018, serum creatinine based estimated GFR (eGFR) will be   calculated using the Chronic Kidney Disease Epidemiology Collaboration   (CKD-EPI) equation.     2020 70 >60 mL/min/[1.73_m2] Final     Comment:     Non  GFR Calc  Starting 2018, serum creatinine based estimated GFR (eGFR) will be   calculated using the Chronic Kidney Disease Epidemiology Collaboration    (CKD-EPI) equation.       Hyperlipidemia:   pravastatin 80mg once daily    Pt reports no significant myalgias or other side effects.   Recent Labs   Lab Test 02/16/22  0742 02/15/21  0732 05/27/16  0801 06/15/15  0741 09/16/14  1147 03/14/14  0726   CHOL 177 161   < > 156  --  213*   HDL 62 62   < > 58  --  44   LDL 88 71   < > 75   < > 119   TRIG 136 139   < > 115  --  248*   CHOLHDLRATIO  --   --   --  2.7  --  4.9    < > = values in this interval not displayed.     Insomnia:   Melatonin 1-2 mg at bedtime PRN (hasn't had to use recently)    Has trouble falling asleep on occassion, finds this effective. Denies side effects.     ED/BPH:    sildenafil 40-80mg as needed    Works well, denies side effects.   Follows with Dr. Steiner.  Had procedure on his prostate, no longer needs the tamsulosin.    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  Finds these effective.  He denies side effects.  He's aware of maximum APAP dosing/day. He has history of steroid injections, continues with PT exercises.     Allergies:    Zyrtec 10mg daily PRN seasonally    He finds effective.  He denies side effects.    Spot on Lung:    CAT scan revealed a spot on his lungs in the past, repeat CAT scan showed no change, no concern for cancer at this time, it will be monitored yearly.    Today's Vitals: There were no vitals taken for this visit.  ----------------      I spent 23 minutes with this patient today. All changes were made via collaborative practice agreement with Curt Schneider MD. A copy of the visit note was provided to the patient's provider(s).    The patient was sent via [employer] a summary of these recommendations.     Anna Su, Thad  Medication Therapy Management Pharmacist  [phone]    Telemedicine Visit Details  Type of service:  Telephone visit  Start Time: 10:31 AM  End Time: 10:54 AM  Originating Location (patient location):  Home  Distant Location (provider location):  [facility]     Medication Therapy Recommendations  No medication therapy recommendations to display

## 2022-04-13 NOTE — LETTER
_  Medication List        Prepared on: 4/13/2022     Bring your Medication List when you go to the doctor, hospital, or   emergency room. And, share it with your family or caregivers.     Note any changes to how you take your medications.  Cross out medications when you no longer use them.    Medication How I take it Why I use it Prescriber   acetaminophen (TYLENOL) 500 MG tablet Take 500-1,000 mg by mouth every 6 hours as needed for mild pain pain Patient Reported   aspirin 81 MG tablet Take 1 tablet by mouth daily. Type 2 Diabetes, HbA1c Goal < 7% (H) Curt Schneider MD   B-D U/F insulin pen needle USE PEN FOUR TIMES DAILY OR AS DIRECTED Type 2 diabetes mellitus with diabetic polyneuropathy, with long-term current use of insulin (H) Bailey Mix MD   blood glucose (ONETOUCH VERIO IQ) test strip TEST FOUR TIMES DAILY OR AS DIRECTED Type 2 diabetes mellitus with diabetic polyneuropathy, with long-term current use of insulin (H) Curt Schneider MD   cetirizine (ZYRTEC) 10 MG tablet Take 10 mg by mouth daily as needed for allergies allergies Patient Reported   insulin aspart (NOVOLOG FLEXPEN) 100 UNIT/ML pen INJECT 4 UNITS UNDER THE SKIN THREE TIMES DAILY BEFORE MEAL PLUS CORRECTION FACTOR OF 1/80/80. MAX 25 UNITS PER DAY. Type 2 diabetes mellitus with diabetic polyneuropathy, with long-term current use of insulin (H) Curt Schneider MD   insulin glargine (LANTUS SOLOSTAR) 100 UNIT/ML pen INJECT 6 UNITS UNDER THE SKIN DAILY OR AS DIRECTED. PRIME WITH 2 UNITS EACH TIME(8 UNITS/DAY) Type 2 diabetes mellitus with diabetic polyneuropathy, with long-term current use of insulin (H) Curt Schneider MD   insulin pen needle (B-D U/F) 31G X 5 MM miscellaneous Use 4 pen needles daily or as directed. Type 2 diabetes mellitus with diabetic polyneuropathy, with long-term current use of insulin (H) Curt Schneider MD   lisinopril (ZESTRIL) 2.5 MG tablet TAKE 1 TABLETS BY MOUTH DAILY Type 2 diabetes mellitus with diabetic  polyneuropathy, with long-term current use of insulin (H); Microalbuminuria Curt Schneider MD   melatonin 1 MG TABS Take 2 mg by mouth nightly as needed for sleep sleep Patient Reported   Menthol, Topical Analgesic, (BIOFREEZE EX) Apply topically as needed to shoulders/knees. pain Patient Reported   metFORMIN (GLUCOPHAGE) 500 MG tablet  TABLETS BY MOUTH TWICE DAILY WITH MEALS Type 2 diabetes mellitus with diabetic polyneuropathy, with long-term current use of insulin (H) Curt Schneider MD   metFORMIN (GLUCOPHAGE) 500 MG tablet Take 2 tablets (1,000 mg) by mouth 2 times daily (with meals) Type 2 diabetes mellitus with diabetic polyneuropathy, with long-term current use of insulin (H) Curt Schneider MD   pravastatin (PRAVACHOL) 80 MG tablet TAKE 1 TABLET BY MOUTH EVERY DAY Hyperlipidemia LDL Goal <100 Curt Schneider MD   pravastatin (PRAVACHOL) 80 MG tablet Take 1 tablet (80 mg) by mouth daily Hyperlipidemia LDL Goal <100 Curt Schneider MD   sildenafil (REVATIO) 20 MG tablet Take between 2-4 tablets at a time for planned sexual activity, max 5 pills per day. Patient will be paying out of pocket for this medication Erectile Dysfunction of [employer] LINDA Fowler CNP         Add new medications, over-the-counter drugs, herbals, vitamins, or  minerals in the blank rows below.    Medication How I take it Why I use it Prescriber                          Allergies:      lipitor [atorvastatin calcium]; hay fever & [a.r.m.]        Side effects I have had:              Other Information:             My notes and questions:

## 2022-04-13 NOTE — LETTER
"Recommended To-Do List      Prepared on: 4/13/2022     You can get the best results from your medications by completing the items on this \"To-Do List.\"      Bring your To-Do List when you go to your doctor. And, share it with your family or caregivers.    My To-Do List:  What we talked about: What I should do:   What my medicines are for, how to know if my medicines are working, made sure my medicines are safe for me and reviewed how to take my medicines.     Take my medicines every day                  "

## 2022-04-13 NOTE — LETTER
April 13, 2022  Felipe RESTREPO Shannan  [address]  [place] [address]    Dear SHERRIE Dela Cruz [facility] ANA ROSA        Thank you for talking with me on Apr 13, 2022 about your health and medications. As a follow-up to our conversation, I have included two documents:      1. Your Recommended To-Do List has steps you should take to get the best results from your medications.  2. Your Medication List will help you keep track of your medications and how to take them.    If you want to talk about these documents, please call Moriah Su RPH at phone: [phone], Monday-Friday 8-4:30pm.    I look forward to working with you and your doctors to make sure your medications work well for you.    Sincerely,    Moriah Su RPH  [facility] Pharmacist, [facility]

## 2022-05-08 ENCOUNTER — HEALTH MAINTENANCE LETTER (OUTPATIENT)
Age: 72
End: 2022-05-08

## 2022-05-24 ENCOUNTER — TELEPHONE (OUTPATIENT)
Dept: OPHTHALMOLOGY | Facility: CLINIC | Age: 72
End: 2022-05-24

## 2022-07-03 ENCOUNTER — HEALTH MAINTENANCE LETTER (OUTPATIENT)
Age: 72
End: 2022-07-03

## 2022-07-26 DIAGNOSIS — E11.42 TYPE 2 DIABETES MELLITUS WITH DIABETIC POLYNEUROPATHY, WITH LONG-TERM CURRENT USE OF INSULIN (H): ICD-10-CM

## 2022-07-26 DIAGNOSIS — Z79.4 TYPE 2 DIABETES MELLITUS WITH DIABETIC POLYNEUROPATHY, WITH LONG-TERM CURRENT USE OF INSULIN (H): ICD-10-CM

## 2022-07-27 ENCOUNTER — MYC MEDICAL ADVICE (OUTPATIENT)
Dept: INTERNAL MEDICINE | Facility: CLINIC | Age: 72
End: 2022-07-27

## 2022-07-27 DIAGNOSIS — Z79.4 TYPE 2 DIABETES MELLITUS WITH DIABETIC POLYNEUROPATHY, WITH LONG-TERM CURRENT USE OF INSULIN (H): ICD-10-CM

## 2022-07-27 DIAGNOSIS — E78.5 HYPERLIPIDEMIA LDL GOAL <100: Primary | ICD-10-CM

## 2022-07-27 DIAGNOSIS — E11.42 TYPE 2 DIABETES MELLITUS WITH DIABETIC POLYNEUROPATHY, WITH LONG-TERM CURRENT USE OF INSULIN (H): ICD-10-CM

## 2022-07-27 RX ORDER — BLOOD SUGAR DIAGNOSTIC
STRIP MISCELLANEOUS
Qty: 400 STRIP | Refills: 1 | Status: SHIPPED | OUTPATIENT
Start: 2022-07-27 | End: 2023-04-06

## 2022-08-22 ENCOUNTER — PREP FOR PROCEDURE (OUTPATIENT)
Dept: OPHTHALMOLOGY | Facility: CLINIC | Age: 72
End: 2022-08-22

## 2022-08-22 ENCOUNTER — LAB (OUTPATIENT)
Dept: LAB | Facility: CLINIC | Age: 72
End: 2022-08-22
Payer: COMMERCIAL

## 2022-08-22 DIAGNOSIS — Z79.4 TYPE 2 DIABETES MELLITUS WITH DIABETIC POLYNEUROPATHY, WITH LONG-TERM CURRENT USE OF INSULIN (H): ICD-10-CM

## 2022-08-22 DIAGNOSIS — H25.811 COMBINED FORM OF AGE-RELATED CATARACT, RIGHT EYE: ICD-10-CM

## 2022-08-22 DIAGNOSIS — H25.812 COMBINED FORM OF AGE-RELATED CATARACT, LEFT EYE: Primary | ICD-10-CM

## 2022-08-22 DIAGNOSIS — E11.42 TYPE 2 DIABETES MELLITUS WITH DIABETIC POLYNEUROPATHY, WITH LONG-TERM CURRENT USE OF INSULIN (H): ICD-10-CM

## 2022-08-22 LAB
ANION GAP SERPL CALCULATED.3IONS-SCNC: 6 MMOL/L (ref 3–14)
BUN SERPL-MCNC: 21 MG/DL (ref 7–30)
CALCIUM SERPL-MCNC: 9.3 MG/DL (ref 8.5–10.1)
CHLORIDE BLD-SCNC: 106 MMOL/L (ref 94–109)
CO2 SERPL-SCNC: 27 MMOL/L (ref 20–32)
CREAT SERPL-MCNC: 1.08 MG/DL (ref 0.66–1.25)
GFR SERPL CREATININE-BSD FRML MDRD: 74 ML/MIN/1.73M2
GLUCOSE BLD-MCNC: 106 MG/DL (ref 70–99)
HBA1C MFR BLD: 6.6 % (ref 0–5.6)
POTASSIUM BLD-SCNC: 4.4 MMOL/L (ref 3.4–5.3)
SODIUM SERPL-SCNC: 139 MMOL/L (ref 133–144)

## 2022-08-22 PROCEDURE — 80048 BASIC METABOLIC PNL TOTAL CA: CPT

## 2022-08-22 PROCEDURE — 36415 COLL VENOUS BLD VENIPUNCTURE: CPT

## 2022-08-22 PROCEDURE — 83036 HEMOGLOBIN GLYCOSYLATED A1C: CPT

## 2022-08-28 RX ORDER — FLURBIPROFEN SODIUM 0.3 MG/ML
SOLUTION/ DROPS OPHTHALMIC
Qty: 400 EACH | Refills: 3 | Status: CANCELLED | OUTPATIENT
Start: 2022-08-28

## 2022-08-28 RX ORDER — INSULIN ASPART 100 [IU]/ML
INJECTION, SOLUTION INTRAVENOUS; SUBCUTANEOUS
Qty: 45 ML | Refills: 1 | Status: CANCELLED | OUTPATIENT
Start: 2022-08-28

## 2022-08-28 RX ORDER — INSULIN GLARGINE 100 [IU]/ML
INJECTION, SOLUTION SUBCUTANEOUS
Qty: 15 ML | Refills: 1 | Status: CANCELLED | OUTPATIENT
Start: 2022-08-28

## 2022-08-29 ENCOUNTER — OFFICE VISIT (OUTPATIENT)
Dept: INTERNAL MEDICINE | Facility: CLINIC | Age: 72
End: 2022-08-29
Payer: COMMERCIAL

## 2022-08-29 VITALS
DIASTOLIC BLOOD PRESSURE: 68 MMHG | BODY MASS INDEX: 21.13 KG/M2 | WEIGHT: 206 LBS | RESPIRATION RATE: 14 BRPM | TEMPERATURE: 97.9 F | SYSTOLIC BLOOD PRESSURE: 116 MMHG | OXYGEN SATURATION: 97 % | HEART RATE: 70 BPM

## 2022-08-29 DIAGNOSIS — E11.42 TYPE 2 DIABETES MELLITUS WITH DIABETIC POLYNEUROPATHY, WITH LONG-TERM CURRENT USE OF INSULIN (H): Primary | ICD-10-CM

## 2022-08-29 DIAGNOSIS — Z79.4 TYPE 2 DIABETES MELLITUS WITH DIABETIC POLYNEUROPATHY, WITH LONG-TERM CURRENT USE OF INSULIN (H): Primary | ICD-10-CM

## 2022-08-29 PROCEDURE — 99213 OFFICE O/P EST LOW 20 MIN: CPT | Performed by: INTERNAL MEDICINE

## 2022-08-29 PROCEDURE — 99207 PR FOOT EXAM NO CHARGE: CPT | Performed by: INTERNAL MEDICINE

## 2022-08-29 NOTE — PROGRESS NOTES
Assessment & Plan     Type 2 diabetes mellitus with diabetic polyneuropathy, with long-term current use of insulin (H)  This is a 6 month follow up appointment for a patient with well controlled diabetes mellitus. He does take insulin. This is a model patient. He shares that he saw his father eventually lose his macario with diabetes mellitus type 2 and prior to his death his father had a below the knee amputation and other complications of advanced diabetes mellitus. On the contrary this patient has had an exemplary diabetes mellitus picture for approximately 5 years [ ever since starting insulin therapy] he's had excellent control and remains complete on all diabetes mellitus benchmarks   Lab Results   Component Value Date    A1C 6.6 08/22/2022    A1C 6.7 02/16/2022    A1C 6.4 07/06/2021    A1C 7.1 02/15/2021    A1C 6.3 07/29/2020    A1C 6.7 01/13/2020    A1C 6.4 07/09/2019       Wt Readings from Last 5 Encounters:   08/29/22 93.4 kg (206 lb)   02/18/22 93.7 kg (206 lb 9.6 oz)   07/22/21 92.3 kg (203 lb 9.3 oz)   07/06/21 93.6 kg (206 lb 6.4 oz)   02/22/21 93.9 kg (207 lb)         Review of the result(s) of each unique test - preclinic labs from 8-  Prescription drug management  14 minutes spent on the date of the encounter doing chart review, history and exam, documentation and further activities per the note      Return in about 6 months (around 2/28/2023).    Curt Schneider MD  [facility] ANA ROSA Jaquez is a 72 year old  presenting for the following health issues:  Diabetes    Encounter Diagnosis   Name Primary?     Type 2 diabetes mellitus with diabetic polyneuropathy, with long-term current use of insulin (H) Yes        History of Present Illness       Diabetes:   He presents for follow up of diabetes.  He is checking home blood glucose four or more times daily. He checks blood glucose before and after meals.  Blood glucose is sometimes over 200 and never under 70. He is  aware of hypoglycemia symptoms including shakiness. He has no concerns regarding his diabetes at this time.  He is having numbness in feet.         He eats 2-3 servings of fruits and vegetables daily.He consumes 0 sweetened beverage(s) daily.He exercises with enough effort to increase his heart rate 10 to 19 minutes per day.  He exercises with enough effort to increase his heart rate 3 or less days per week.   He is taking medications regularly.     Last appointment with me was 2-18-22     See pre-clinic laboratory studies done , hemoglobin a1c  [ diabetes test ] is excellent.    Answers for HPI/ROS submitted by the patient on 8/26/2022  Frequency of checking blood sugars:: four or more times daily  What time of day are you checking your blood sugars : before and after meals  Have you had any blood sugars above 200?: Yes  Have you had any blood sugars below 70?: No  Hypoglycemia symptoms:: shakiness  Diabetic concerns:: none  Paraesthesia present:: numbness in feet  How many servings of fruits and vegetables do you eat daily?: 2-3  On average, how many sweetened beverages do you drink each day (Examples: soda, juice, sweet tea, etc.  Do NOT count diet or artificially sweetened beverages)?: 0  How many minutes a day do you exercise enough to make your heart beat faster?: 10 to 19  How many days a week do you exercise enough to make your heart beat faster?: 3 or less  How many days per week do you miss taking your medication?: 0        Review of Systems   Constitutional, HEENT, cardiovascular, pulmonary, gi and gu systems are negative, except as otherwise noted.      Objective    /68   Pulse 70   Temp 97.9  F (36.6  C) (Oral)   Resp 14   Wt 93.4 kg (206 lb)   SpO2 97%   BMI 21.13 kg/m    Body mass index is 21.13 kg/m .  Physical Exam   GENERAL: healthy, alert and no distress  EYES: Eyes grossly normal to inspection, PERRL and conjunctivae and sclerae normal  RESP: lungs clear to auscultation - no rales,  rhonchi or wheezes  CV: regular rate and rhythm, normal S1 S2, no S3 or S4, no murmur, click or rub, no peripheral edema and peripheral pulses strong  MS: no gross musculoskeletal defects noted, no edema  NEURO: Normal strength and tone, mentation intact and speech normal  PSYCH: mentation appears normal, affect normal/bright    He needs no specific refill today

## 2022-08-31 ENCOUNTER — TELEPHONE (OUTPATIENT)
Dept: OPHTHALMOLOGY | Facility: CLINIC | Age: 72
End: 2022-08-31

## 2022-08-31 NOTE — TELEPHONE ENCOUNTER
Patient has been an RGP wearer in the past. Called to talk to him about being out of them three weeks prior to the IOL measurements for the cataract surgery on 10-12-22.  Also to try and wear only glasses until the cataract surgeries are done.   No answer, left him message to call me back. Will send him my chart message as well.

## 2022-09-04 NOTE — MR AVS SNAPSHOT
After Visit Summary   3/23/2017    Felipe Campbell    MRN: [id number]           Patient Information     Date Of Birth          1950        Visit Information        Provider Department      3/23/2017 8:30 AM  DIABETIC ED RESOURCE [facility]        Today's Diagnoses     Type 2 diabetes, controlled, with peripheral neuropathy    -  1      Care Instructions     Friday evening take metformin and 5 mg of glyburide. Sat morning take metformin and 5 mg of glyburide.  At supper on Sat take metformin and 5 mg of glyburide.  On Friday at bedtime take 10 units of Lantus.   On Sat take 4 units of Novolog 15 minutes before breakfast, 3 units before lunch and 3 units before supper.   Recommend to eat 3 carb choices at each meal.  Carry sugar with you at all times.  Call if concerns.        Follow-ups after your visit        Your next 10 appointments already scheduled     Mar 30, 2017  8:30 AM CDT   Diabetic Education with  DIABETIC ED RESOURCE   [facility])    [address] [address] [phone]              Who to contact     If you have questions or need follow up information about today's clinic visit or your schedule please contact [facility] directly at [phone].  Normal or non-critical lab and imaging results will be communicated to you by [employer], letter or phone within 4 business days after the clinic has received the results. If you do not hear from us within 7 days, please contact the clinic through [employer] or phone. If you have a critical or abnormal lab result, we will notify you by phone as soon as possible.  Submit refill requests through [employer] or call your pharmacy and they will forward the refill request to us. Please allow 3 business days for your refill to be completed.          Additional Information About Your Visit        [employer] Information     [employer] gives you secure access to your  Ice every hour for 10-15 minutes elevate above the level of the shoulders as much as possible for the next 24 hours Prelone daily start tonight and again tomorrow morning for the 2nd dose use for 5 days loratadine over-the-counter 5 mL or 1 tsp daily for the next week  Patient should be seen in the emergency room in the next 12-24 hours any if increasing discomfort or unable to decreased pain  Continue to use Tylenol every 6 hours as needed for pain   electronic health record. If you see a primary care provider, you can also send messages to your care team and make appointments. If you have questions, please call your primary care clinic.  If you do not have a primary care provider, please call [phone] and they will assist you.        Care EveryWhere ID     This is your Care EveryWhere ID. This could be used by other organizations to access your [facility] medical records  [id number]        Your Vitals Were     BMI (Body Mass Index)                   27.27 kg/m2            Blood Pressure from Last 3 Encounters:   12/06/16 132/72   11/08/16 119/80   06/02/16 137/81    Weight from Last 3 Encounters:   03/23/17 224 lb (101.6 kg)   03/09/17 225 lb (102.1 kg)   12/22/16 226 lb (102.5 kg)              Today, you had the following     No orders found for display         Today's Medication Changes          These changes are accurate as of: 3/23/17  9:16 AM.  If you have any questions, ask your nurse or doctor.               Start taking these medicines.        Dose/Directions    insulin aspart 100 UNIT/ML injection   Commonly known as:  NovoLOG FLEXPEN   Used for:  Type 2 diabetes, controlled, with peripheral neuropathy (H)        4 units before breakfast, 3 units before lunch, 3 units before dinner   Quantity:  30 mL   Refills:  1       insulin glargine 100 UNIT/ML injection   Commonly known as:  LANTUS SOLOSTAR   Used for:  Type 2 diabetes, controlled, with peripheral neuropathy (H)        Dose:  10 Units   Inject 10 Units Subcutaneous At Bedtime   Quantity:  15 mL   Refills:  1       insulin pen needle 31G X 6 MM   Used for:  Type 2 diabetes, controlled, with peripheral neuropathy (H)        Use 4 daily or as directed.   Quantity:  100 each   Refills:  prn         These medicines have changed or have updated prescriptions.        Dose/Directions    glyBURIDE 5 MG tablet   Commonly known as:  DIABETA /MICRONASE   This may have changed:  additional instructions    Used for:  Type 2 diabetes, controlled, with peripheral neuropathy (H)        TAKE  1  TABLET BY MOUTH with breakfast and 1 tablets with supper.   Quantity:  360 tablet   Refills:  0         Stop taking these medicines if you haven't already. Please contact your care team if you have questions.     pioglitazone 45 MG tablet   Commonly known as:  ACTOS                Where to get your medicines      Some of these will need a paper prescription and others can be bought over the counter.  Ask your nurse if you have questions.     Bring a paper prescription for each of these medications     glyBURIDE 5 MG tablet    insulin aspart 100 UNIT/ML injection    insulin glargine 100 UNIT/ML injection    insulin pen needle 31G X 6 MM                Primary Care Provider Office Phone # Fax #    Curt Schneider [phone]       [address] [address]        Thank you!     Thank you for choosing [facility]  for your care. Our goal is always to provide you with excellent care. Hearing back from our patients is one way we can continue to improve our services. Please take a few minutes to complete the written survey that you may receive in the mail after your visit with us. Thank you!             Your Updated Medication List - Protect others around you: Learn how to safely use, store and throw away your medicines at www.disposemymeds.org.          This list is accurate as of: 3/23/17  9:16 AM.  Always use your most recent med list.                   Brand Name Dispense Instructions for use    ACE/ARB NOT PRESCRIBED (INTENTIONAL)      by Other route continuous prn.       aspirin 81 MG tablet      Take 1 tablet by mouth daily.       blood glucose monitoring lancets     1 Box    Use to test blood sugar 2 times daily or as directed.  Ok to substitute alternative if insurance prefers.       blood glucose monitoring test strip    ACCU-CHEK SMARTVIEW    100 strip     Use to test blood sugar 2 times daily or as directed.  Ok to substitute alternative if insurance prefers.       cetirizine 10 MG tablet    zyrTEC     Take 10 mg by mouth daily as needed for allergies       fish oil-omega-3 fatty acids 1000 MG capsule      Take 1 capsule by mouth 2 times daily       glyBURIDE 5 MG tablet    DIABETA /MICRONASE    360 tablet    TAKE  1  TABLET BY MOUTH with breakfast and 1 tablets with supper.       insulin aspart 100 UNIT/ML injection    NovoLOG FLEXPEN    30 mL    4 units before breakfast, 3 units before lunch, 3 units before dinner       insulin glargine 100 UNIT/ML injection    LANTUS SOLOSTAR    15 mL    Inject 10 Units Subcutaneous At Bedtime       insulin pen needle 31G X 6 MM     100 each    Use 4 daily or as directed.       lisinopril 5 MG tablet    PRINIVIL/ZESTRIL    90 tablet    TAKE 1 TABLET(5 MG) BY MOUTH DAILY       melatonin 1 MG Tabs tablet      Take 2 mg by mouth nightly as needed for sleep       metFORMIN 500 MG tablet    GLUCOPHAGE    360 tablet    TAKE 2 TABLETS BY MOUTH TWICE DAILY WITH MEALS       pravastatin 80 MG tablet    PRAVACHOL    90 tablet    TAKE 1 TABLET(80 MG) BY MOUTH DAILY       VIAGRA 100 MG cap/tab   Generic drug:  sildenafil     8 tablet    TAKE 1 TABLET BY MOUTH DAILY AS NEEDED FOR ERECTILE DYSFUNCTION AT LEAST 30 MINUTES BEFORE INTERCOURSE

## 2022-09-27 NOTE — TELEPHONE ENCOUNTER
Patient has rescheduled surgery for 10/24/2022 at [facility]   preop 10/17  Postop 10/25

## 2022-09-27 NOTE — TELEPHONE ENCOUNTER
Patient has rescheduled surgery for 11/14/2022 at [facility]  preop 10/17/2022  Postop 11/15/2022

## 2022-10-09 DIAGNOSIS — N52.9 ERECTILE DYSFUNCTION OF ORGANIC ORIGIN: ICD-10-CM

## 2022-10-11 ENCOUNTER — TELEPHONE (OUTPATIENT)
Dept: OPHTHALMOLOGY | Facility: CLINIC | Age: 72
End: 2022-10-11

## 2022-10-11 DIAGNOSIS — N52.9 ERECTILE DYSFUNCTION OF ORGANIC ORIGIN: ICD-10-CM

## 2022-10-11 RX ORDER — SILDENAFIL CITRATE 20 MG/1
TABLET ORAL
Qty: 30 TABLET | Refills: 0 | Status: SHIPPED | OUTPATIENT
Start: 2022-10-11 | End: 2024-06-17

## 2022-10-11 NOTE — TELEPHONE ENCOUNTER
Called and left patient reminder message to be out of the gas permeable lenses. Told him to call back if he has not been out of lenses.

## 2022-10-12 ENCOUNTER — PREP FOR PROCEDURE (OUTPATIENT)
Dept: OPHTHALMOLOGY | Facility: CLINIC | Age: 72
End: 2022-10-12

## 2022-10-12 ENCOUNTER — OFFICE VISIT (OUTPATIENT)
Dept: OPHTHALMOLOGY | Facility: CLINIC | Age: 72
End: 2022-10-12
Payer: COMMERCIAL

## 2022-10-12 DIAGNOSIS — H43.813 POSTERIOR VITREOUS DETACHMENT OF BOTH EYES: ICD-10-CM

## 2022-10-12 DIAGNOSIS — H25.812 COMBINED FORM OF AGE-RELATED CATARACT, LEFT EYE: ICD-10-CM

## 2022-10-12 DIAGNOSIS — H25.811 COMBINED FORM OF AGE-RELATED CATARACT, RIGHT EYE: Primary | ICD-10-CM

## 2022-10-12 PROCEDURE — 92014 COMPRE OPH EXAM EST PT 1/>: CPT | Performed by: STUDENT IN AN ORGANIZED HEALTH CARE EDUCATION/TRAINING PROGRAM

## 2022-10-12 PROCEDURE — 92136 OPHTHALMIC BIOMETRY: CPT | Mod: TC | Performed by: STUDENT IN AN ORGANIZED HEALTH CARE EDUCATION/TRAINING PROGRAM

## 2022-10-12 RX ORDER — MONOCHLOROACETIC ACID
1 CRYSTALS MISCELLANEOUS 4 TIMES DAILY
Qty: 1 G | Refills: 1 | Status: SHIPPED | OUTPATIENT
Start: 2022-10-12 | End: 2022-12-07

## 2022-10-12 RX ORDER — BROMFENAC SODIUM 100 %
1 POWDER (GRAM) MISCELLANEOUS 4 TIMES DAILY
Qty: 1 G | Refills: 1 | Status: SHIPPED | OUTPATIENT
Start: 2022-10-12 | End: 2022-12-07

## 2022-10-12 RX ORDER — MOXIFLOXACIN 5 MG/ML
1 SOLUTION/ DROPS OPHTHALMIC 4 TIMES DAILY
Qty: 3 ML | Refills: 0 | Status: SHIPPED | OUTPATIENT
Start: 2022-10-12 | End: 2022-10-12

## 2022-10-12 RX ORDER — PREDNISOLONE ACETATE 10 MG/ML
1 SUSPENSION/ DROPS OPHTHALMIC 4 TIMES DAILY
Qty: 5 ML | Refills: 0 | Status: SHIPPED | OUTPATIENT
Start: 2022-10-12 | End: 2022-10-12

## 2022-10-12 RX ORDER — KETOROLAC TROMETHAMINE 5 MG/ML
1 SOLUTION OPHTHALMIC 4 TIMES DAILY
Qty: 5 ML | Refills: 0 | Status: SHIPPED | OUTPATIENT
Start: 2022-10-12 | End: 2022-10-12

## 2022-10-12 RX ORDER — DEXAMETHASONE ACETATE, MICRO 100 %
1 POWDER (GRAM) MISCELLANEOUS 4 TIMES DAILY
Qty: 1 G | Refills: 1 | Status: SHIPPED | OUTPATIENT
Start: 2022-10-12 | End: 2022-12-07

## 2022-10-12 RX ORDER — SILDENAFIL CITRATE 20 MG/1
TABLET ORAL
Qty: 90 TABLET | OUTPATIENT
Start: 2022-10-12

## 2022-10-12 ASSESSMENT — VISUAL ACUITY
CORRECTION_TYPE: GLASSES
OD_CC: 20/50
METHOD: SNELLEN - LINEAR
OS_CC: 20/30
OD_PH_CC: 20/40

## 2022-10-12 ASSESSMENT — SLIT LAMP EXAM - LIDS
COMMENTS: NORMAL
COMMENTS: NORMAL

## 2022-10-12 ASSESSMENT — TONOMETRY
OD_IOP_MMHG: 17
IOP_METHOD: APPLANATION
OS_IOP_MMHG: 19

## 2022-10-12 ASSESSMENT — CUP TO DISC RATIO
OS_RATIO: 0.3
OD_RATIO: 0.3

## 2022-10-12 ASSESSMENT — EXTERNAL EXAM - LEFT EYE: OS_EXAM: 2+ BROW PTOSIS, MILD TO MOD BROW

## 2022-10-12 ASSESSMENT — EXTERNAL EXAM - RIGHT EYE: OD_EXAM: 2+ BROW PTOSIS, MILD TO MOD BROW

## 2022-10-12 NOTE — LETTER
10/12/2022         RE: Felipe Campbell  [address]  [place] [address]        Dear Colleague,    Thank you for referring your patient, Felipe Campbell, to the [facility]. Please see a copy of my visit note below.     Current Eye Medications:       Subjective:  Patient here for pre-op cataract surgery, right eye, scheduled for 10-24-22.  History and Physical done on 10-19-22.  He discontinued contact lens wear about 3+ weeks ago.  Covid test will be done at home.     From Dr. Monet's previous note:   RH.  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     Objective:  See Ophthalmology Exam.       Assessment:  Felipe Campbell is a 72 year old male who presents with:   Encounter Diagnoses   Name Primary?     Combined form of age-related cataract, right eye Yes    Cataract pre-op right eye. Dil 6.5. CA3. Possible Trypan. He desires targeting mostly distance both eyes after long discussion. See above discussion - was on Flomax shortly at one point.     Combined form of age-related cataract, left eye      Posterior vitreous detachment of both eyes        Plan:  PRE-OP CATARACT INSTRUCTIONS       Drugs will be in touch to get your credit card number and shipping address    *Use the following drops in the right eye 4 times on the day before surgery and once the morning of surgery:                                   [email or web address]    *Please bring all your eyedrops to the surgery center.    *No solid food or drink after midnight.     *If you are diabetic, please do not take any diabetic medications the morning of surgery.    Concha Oglesby MD  [phone]   \      Again, thank you for allowing  me to participate in the care of your patient.        Sincerely,        Concha Oglesby MD

## 2022-10-12 NOTE — PROGRESS NOTES
Current Eye Medications:       Subjective:  Patient here for pre-op cataract surgery, right eye, scheduled for 10-24-22.  History and Physical done on 10-19-22.  He discontinued contact lens wear about 3+ weeks ago.  Covid test will be done at home.     From Dr. Monet's previous note:   RH.  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     Objective:  See Ophthalmology Exam.       Assessment:  Felipe Campbell is a 72 year old male who presents with:   Encounter Diagnoses   Name Primary?     Combined form of age-related cataract, right eye Yes    Cataract pre-op right eye. Dil 6.5. CA3. Possible Trypan. He desires targeting mostly distance both eyes after long discussion. See above discussion - was on Flomax shortly at one point.     Combined form of age-related cataract, left eye      Posterior vitreous detachment of both eyes        Plan:  PRE-OP CATARACT INSTRUCTIONS       Drugs will be in touch to get your credit card number and shipping address    *Use the following drops in the right eye 4 times on the day before surgery and once the morning of surgery:                                   [email or web address]    *Please bring all your eyedrops to the surgery center.    *No solid food or drink after midnight.     *If you are diabetic, please do not take any diabetic medications the morning of surgery.    Concha Oglesby MD  [phone]   \

## 2022-10-12 NOTE — PATIENT INSTRUCTIONS
PRE-OP CATARACT INSTRUCTIONS     Drugs will be in touch to get your credit card number and shipping address    *Use the following drops in the right eye 4 times on the day before surgery and once the morning of surgery:                                   [email or web address]    *Please bring all your eyedrops to the surgery center.    *No solid food or drink after midnight.     *If you are diabetic, please do not take any diabetic medications the morning of surgery.    Concha Oglesby MD  [phone]

## 2022-10-19 ENCOUNTER — OFFICE VISIT (OUTPATIENT)
Dept: FAMILY MEDICINE | Facility: CLINIC | Age: 72
End: 2022-10-19
Payer: COMMERCIAL

## 2022-10-19 VITALS
BODY MASS INDEX: 21.23 KG/M2 | OXYGEN SATURATION: 98 % | WEIGHT: 207 LBS | TEMPERATURE: 97 F | SYSTOLIC BLOOD PRESSURE: 121 MMHG | DIASTOLIC BLOOD PRESSURE: 71 MMHG | HEART RATE: 76 BPM

## 2022-10-19 DIAGNOSIS — Z01.818 PREOP GENERAL PHYSICAL EXAM: ICD-10-CM

## 2022-10-19 DIAGNOSIS — Z79.4 TYPE 2 DIABETES MELLITUS WITH DIABETIC POLYNEUROPATHY, WITH LONG-TERM CURRENT USE OF INSULIN (H): ICD-10-CM

## 2022-10-19 DIAGNOSIS — H25.813 COMBINED FORMS OF AGE-RELATED CATARACT OF BOTH EYES: Primary | ICD-10-CM

## 2022-10-19 DIAGNOSIS — E11.42 TYPE 2 DIABETES MELLITUS WITH DIABETIC POLYNEUROPATHY, WITH LONG-TERM CURRENT USE OF INSULIN (H): ICD-10-CM

## 2022-10-19 PROCEDURE — 99214 OFFICE O/P EST MOD 30 MIN: CPT | Performed by: PHYSICIAN ASSISTANT

## 2022-10-19 NOTE — H&P (VIEW-ONLY)
[facility]  [address]  [place] [address]  Phone: [phone]  Primary Provider: Curt Schneider  Pre-op Performing Provider: SHAHID GILES      PREOPERATIVE EVALUATION:  Today's date: 10/19/2022    Felipe Campbell is a 72 year old male who presents for a preoperative evaluation.    Surgical Information:  Surgery/Procedure: R cataract then left cataract   Surgery Location: [place]    Surgeon: Concha Oglesby   Surgery Date: 10-24-22   Time of Surgery: 9:00 am   Where patient plans to recover: At home with family  Fax number for surgical facility:     Type of Anesthesia Anticipated: MAC with Topical     Assessment & Plan     The proposed surgical procedure is considered LOW risk.    Combined forms of age-related cataract, mod, of both eyes  No concerns noted for surgery     Type 2 diabetes mellitus with diabetic polyneuropathy, with long-term current use of insulin (H)  Well controlled.  Discussed when to stop medications.  See AVS    Preop general physical exam             Risks and Recommendations:  The patient has the following additional risks and recommendations for perioperative complications:   - No identified additional risk factors other than previously addressed    Medication Instructions:   - aspirin: Bleeding risk is low for this procedure and daily aspirin may be continued without modification.    - Long acting insulin (e.g. glargine, detemir): Take 80% of the usual evening or morning dose before surgery.          - short acting insulin (e.g. regular, lispro, aspart): HOLD on the morning of surgery.    - metformin: HOLD day of surgery.    RECOMMENDATION:  APPROVAL GIVEN to proceed with proposed procedure, without further diagnostic evaluation.                      Subjective     HPI related to upcoming procedure: cataract     Preop Questions 10/19/2022   1. Have you ever had a heart attack or stroke? No   2. Have you ever had surgery on your heart or  blood vessels, such as a stent placement, a coronary artery bypass, or surgery on an artery in your head, neck, heart, or legs? No   3. Do you have chest pain with activity? No   4. Do you have a history of  heart failure? No   5. Do you currently have a cold, bronchitis or symptoms of other infection? No   6. Do you have a cough, shortness of breath, or wheezing? No   7. Do you or anyone in your family have previous history of blood clots? No   8. Do you or does anyone in your family have a serious bleeding problem such as prolonged bleeding following surgeries or cuts? No   9. Have you ever had problems with anemia or been told to take iron pills? No   10. Have you had any abnormal blood loss such as black, tarry or bloody stools? No   11. Have you ever had a blood transfusion? No   12. Are you willing to have a blood transfusion if it is medically needed before, during, or after your surgery? Yes   13. Have you or any of your relatives ever had problems with anesthesia? No   14. Do you have sleep apnea, excessive snoring or daytime drowsiness? No   15. Do you have any artifical heart valves or other implanted medical devices like a pacemaker, defibrillator, or continuous glucose monitor? No   16. Do you have artificial joints? No   17. Are you allergic to latex? No       Health Care Directive:  Patient does not have a Health Care Directive or Living Will: yes     Preoperative Review of :   reviewed - no record of controlled substances prescribed.          Review of Systems  CONSTITUTIONAL: NEGATIVE for fever, chills, change in weight  ENT/MOUTH: NEGATIVE for ear, mouth and throat problems  RESP: NEGATIVE for significant cough or SOB  CV: NEGATIVE for chest pain, palpitations or peripheral edema  NEURO: no headaches     Patient Active Problem List    Diagnosis Date Noted     Combined forms of age-related cataract, mod, of both eyes 10/28/2019     Priority: Medium     Posterior vitreous detachment of both  eyes 10/24/2018     Priority: Medium     CKD (chronic kidney disease) stage 2, GFR 60-89 ml/min 09/11/2012     Priority: Medium     Based on 2 elevated urine microalbumin levels albeit trivial , and a gfr [ glomerular filtration rate ] in the 80's , see laboratory section of Epic electronic medical records  , laboratory studies from 2012       Type 2 diabetes mellitus with diabetic polyneuropathy, with long-term current use of insulin (H) 12/15/2011     Priority: Medium     Advanced directives, counseling/discussion 06/23/2011     Priority: Medium     Advance Directive Problem List Overview:   Name Relationship Phone    Primary Health Care Agent            Alternative Health Care Agent          Discussed advance care planning with patient; information given to patient to review. 6/23/2011          Hayfever      Priority: Medium     summer and fall       Microalbuminuria 06/02/2011     Priority: Medium     HYPERLIPIDEMIA LDL GOAL <100 10/31/2010     Priority: Medium     Erectile dysfunction of organic origin      Priority: Medium     dm related        Past Medical History:   Diagnosis Date     BPH      Diabetes (H) 2008     Erectile dysfunction of organic origin     dm related     Hayfever     summer and fall     Hyperlipidemia LDL goal <100 10/31/2010     Microalbuminuria 6/2/2011     Nonsenile cataract      Past Surgical History:   Procedure Laterality Date     COLONOSCOPY  06/01/2016     COLONOSCOPY WITH CO2 INSUFFLATION N/A 11/8/2016    Procedure: COLONOSCOPY WITH CO2 INSUFFLATION;  Surgeon: Torey Almazan MD;  Location: [facility] OR     HERNIA REPAIR, INGUINAL RT/LT  age 5    left     TUNA       ZZC APPENDECTOMY  07/20/69     Current Outpatient Medications   Medication Sig Dispense Refill     acetaminophen (TYLENOL) 500 MG tablet Take 500-1,000 mg by mouth every 6 hours as needed for mild pain       aspirin 81 MG tablet Take 1 tablet by mouth daily.  3     blood glucose (ONETOUCH VERIO IQ) test strip TEST FOUR  TIMES DAILY AS DIRECTED 400 strip 1     cetirizine (ZYRTEC) 10 MG tablet Take 10 mg by mouth daily as needed for allergies       insulin aspart (NOVOLOG FLEXPEN) 100 UNIT/ML pen INJECT 4 UNITS UNDER THE SKIN THREE TIMES DAILY BEFORE MEAL PLUS CORRECTION FACTOR OF 1/80/80. MAX 25 UNITS PER DAY. 45 mL 1     insulin glargine (LANTUS SOLOSTAR) 100 UNIT/ML pen INJECT 6 UNITS UNDER THE SKIN DAILY OR AS DIRECTED. PRIME WITH 2 UNITS EACH TIME(8 UNITS/DAY) 15 mL 1     insulin pen needle (B-D U/F) 31G X 5 MM miscellaneous Use 4 pen needles daily or as directed. 400 each 3     lisinopril (ZESTRIL) 2.5 MG tablet TAKE 1 TABLETS BY MOUTH DAILY 90 tablet 3     melatonin 1 MG TABS Take 2 mg by mouth nightly as needed for sleep       Menthol, Topical Analgesic, (BIOFREEZE EX) Apply topically as needed to shoulders/knees.       metFORMIN (GLUCOPHAGE) 500 MG tablet Take 2 tablets (1,000 mg) by mouth 2 times daily (with meals) 360 tablet 3     pravastatin (PRAVACHOL) 80 MG tablet Take 1 tablet (80 mg) by mouth daily 90 tablet 3     sildenafil (REVATIO) 20 MG tablet TAKE 2 TO 4 TABLETS BY MOUTH AT A TIME FOR PLANNED SEXUAL ACTIVITY. MAX 5 TABLETS PER DAY. 30 tablet 0     Bromfenac Sodium POWD 1 Bottle 4 times daily 1 drop four times a day in the operative eye starting 1 day before surgery. Use until the bottle runs out. (Patient not taking: Reported on 10/19/2022) 1 g 1     Dexamethasone Acetate POWD 1 Bottle 4 times daily 1 drop four times a day in the operative eye starting 1 day before surgery. Use until the bottle runs out. (Patient not taking: Reported on 10/19/2022) 1 g 1     Moxifloxacin HCl POWD 1 Bottle 4 times daily 1 drop four times a day in the operative eye starting 1 day before surgery. Use until the bottle runs out. (Patient not taking: Reported on 10/19/2022) 1 g 1       Allergies   Allergen Reactions     Lipitor [Atorvastatin Calcium]      Rash from lipitor     Hay Fever & [A.R.M.]         Social History     Tobacco Use      Smoking status: Former     Packs/day: 0.50     Years: 7.00     Pack years: 3.50     Types: Cigarettes     Start date: 1972     Quit date: 1979     Years since quittin.8     Smokeless tobacco: Never   Substance Use Topics     Alcohol use: Yes     Comment: occasionally     Family History   Problem Relation Age of Onset     Breast Cancer Mother      Diabetes Father      Diabetes Maternal Grandmother      Congenital Anomalies Sister      Psychotic Disorder Daughter      Glaucoma No family hx of      Macular Degeneration No family hx of      History   Drug Use No         Objective     /71   Pulse 76   Temp 97  F (36.1  C) (Oral)   Wt 93.9 kg (207 lb)   SpO2 98%   BMI 21.23 kg/m      Physical Exam  GENERAL APPEARANCE: healthy, alert and no distress  HENT: ear canals and TM's normal and nose and mouth without ulcers or lesions  RESP: lungs clear to auscultation - no rales, rhonchi or wheezes  CV: regular rate and rhythm, normal S1 S2, no S3 or S4 and no murmur, click or rub   ABDOMEN: soft, nontender, no HSM or masses and bowel sounds normal  NEURO: Normal strength and tone, sensory exam grossly normal, mentation intact and speech normal    Recent Labs   Lab Test 22  0725 22  0742 21  0922   HGB  --   --  13.6     --  141   POTASSIUM 4.4  --  4.7   CR 1.08  --  0.94   A1C 6.6* 6.7* 6.4*        Diagnostics:  No labs were ordered during this visit.   No EKG required for low risk surgery (cataract, skin procedure, breast biopsy, etc).    Revised Cardiac Risk Index (RCRI):  The patient has the following serious cardiovascular risks for perioperative complications:   - Diabetes Mellitus (on Insulin) = 1 point     RCRI Interpretation: 1 point: Class II (low risk - 0.9% complication rate)           Signed Electronically by: Yuliya Almonte PA-C  Copy of this evaluation report is provided to requesting physician.

## 2022-10-19 NOTE — PROGRESS NOTES
[facility]  [address]  [place] [address]  Phone: [phone]  Primary Provider: Curt Schneider  Pre-op Performing Provider: SHAHID GILES      PREOPERATIVE EVALUATION:  Today's date: 10/19/2022    Felipe Campbell is a 72 year old male who presents for a preoperative evaluation.    Surgical Information:  Surgery/Procedure: R cataract then left cataract   Surgery Location: [place]    Surgeon: Concha Oglesby   Surgery Date: 10-24-22   Time of Surgery: 9:00 am   Where patient plans to recover: At home with family  Fax number for surgical facility:     Type of Anesthesia Anticipated: MAC with Topical     Assessment & Plan     The proposed surgical procedure is considered LOW risk.    Combined forms of age-related cataract, mod, of both eyes  No concerns noted for surgery     Type 2 diabetes mellitus with diabetic polyneuropathy, with long-term current use of insulin (H)  Well controlled.  Discussed when to stop medications.  See AVS    Preop general physical exam             Risks and Recommendations:  The patient has the following additional risks and recommendations for perioperative complications:   - No identified additional risk factors other than previously addressed    Medication Instructions:   - aspirin: Bleeding risk is low for this procedure and daily aspirin may be continued without modification.    - Long acting insulin (e.g. glargine, detemir): Take 80% of the usual evening or morning dose before surgery.          - short acting insulin (e.g. regular, lispro, aspart): HOLD on the morning of surgery.    - metformin: HOLD day of surgery.    RECOMMENDATION:  APPROVAL GIVEN to proceed with proposed procedure, without further diagnostic evaluation.                      Subjective     HPI related to upcoming procedure: cataract     Preop Questions 10/19/2022   1. Have you ever had a heart attack or stroke? No   2. Have you ever had surgery on your heart or  blood vessels, such as a stent placement, a coronary artery bypass, or surgery on an artery in your head, neck, heart, or legs? No   3. Do you have chest pain with activity? No   4. Do you have a history of  heart failure? No   5. Do you currently have a cold, bronchitis or symptoms of other infection? No   6. Do you have a cough, shortness of breath, or wheezing? No   7. Do you or anyone in your family have previous history of blood clots? No   8. Do you or does anyone in your family have a serious bleeding problem such as prolonged bleeding following surgeries or cuts? No   9. Have you ever had problems with anemia or been told to take iron pills? No   10. Have you had any abnormal blood loss such as black, tarry or bloody stools? No   11. Have you ever had a blood transfusion? No   12. Are you willing to have a blood transfusion if it is medically needed before, during, or after your surgery? Yes   13. Have you or any of your relatives ever had problems with anesthesia? No   14. Do you have sleep apnea, excessive snoring or daytime drowsiness? No   15. Do you have any artifical heart valves or other implanted medical devices like a pacemaker, defibrillator, or continuous glucose monitor? No   16. Do you have artificial joints? No   17. Are you allergic to latex? No       Health Care Directive:  Patient does not have a Health Care Directive or Living Will: yes     Preoperative Review of :   reviewed - no record of controlled substances prescribed.          Review of Systems  CONSTITUTIONAL: NEGATIVE for fever, chills, change in weight  ENT/MOUTH: NEGATIVE for ear, mouth and throat problems  RESP: NEGATIVE for significant cough or SOB  CV: NEGATIVE for chest pain, palpitations or peripheral edema  NEURO: no headaches     Patient Active Problem List    Diagnosis Date Noted     Combined forms of age-related cataract, mod, of both eyes 10/28/2019     Priority: Medium     Posterior vitreous detachment of both  eyes 10/24/2018     Priority: Medium     CKD (chronic kidney disease) stage 2, GFR 60-89 ml/min 09/11/2012     Priority: Medium     Based on 2 elevated urine microalbumin levels albeit trivial , and a gfr [ glomerular filtration rate ] in the 80's , see laboratory section of Epic electronic medical records  , laboratory studies from 2012       Type 2 diabetes mellitus with diabetic polyneuropathy, with long-term current use of insulin (H) 12/15/2011     Priority: Medium     Advanced directives, counseling/discussion 06/23/2011     Priority: Medium     Advance Directive Problem List Overview:   Name Relationship Phone    Primary Health Care Agent            Alternative Health Care Agent          Discussed advance care planning with patient; information given to patient to review. 6/23/2011          Hayfever      Priority: Medium     summer and fall       Microalbuminuria 06/02/2011     Priority: Medium     HYPERLIPIDEMIA LDL GOAL <100 10/31/2010     Priority: Medium     Erectile dysfunction of organic origin      Priority: Medium     dm related        Past Medical History:   Diagnosis Date     BPH      Diabetes (H) 2008     Erectile dysfunction of organic origin     dm related     Hayfever     summer and fall     Hyperlipidemia LDL goal <100 10/31/2010     Microalbuminuria 6/2/2011     Nonsenile cataract      Past Surgical History:   Procedure Laterality Date     COLONOSCOPY  06/01/2016     COLONOSCOPY WITH CO2 INSUFFLATION N/A 11/8/2016    Procedure: COLONOSCOPY WITH CO2 INSUFFLATION;  Surgeon: Torey Almazan MD;  Location: [facility] OR     HERNIA REPAIR, INGUINAL RT/LT  age 5    left     TUNA       ZZC APPENDECTOMY  07/20/69     Current Outpatient Medications   Medication Sig Dispense Refill     acetaminophen (TYLENOL) 500 MG tablet Take 500-1,000 mg by mouth every 6 hours as needed for mild pain       aspirin 81 MG tablet Take 1 tablet by mouth daily.  3     blood glucose (ONETOUCH VERIO IQ) test strip TEST FOUR  TIMES DAILY AS DIRECTED 400 strip 1     cetirizine (ZYRTEC) 10 MG tablet Take 10 mg by mouth daily as needed for allergies       insulin aspart (NOVOLOG FLEXPEN) 100 UNIT/ML pen INJECT 4 UNITS UNDER THE SKIN THREE TIMES DAILY BEFORE MEAL PLUS CORRECTION FACTOR OF 1/80/80. MAX 25 UNITS PER DAY. 45 mL 1     insulin glargine (LANTUS SOLOSTAR) 100 UNIT/ML pen INJECT 6 UNITS UNDER THE SKIN DAILY OR AS DIRECTED. PRIME WITH 2 UNITS EACH TIME(8 UNITS/DAY) 15 mL 1     insulin pen needle (B-D U/F) 31G X 5 MM miscellaneous Use 4 pen needles daily or as directed. 400 each 3     lisinopril (ZESTRIL) 2.5 MG tablet TAKE 1 TABLETS BY MOUTH DAILY 90 tablet 3     melatonin 1 MG TABS Take 2 mg by mouth nightly as needed for sleep       Menthol, Topical Analgesic, (BIOFREEZE EX) Apply topically as needed to shoulders/knees.       metFORMIN (GLUCOPHAGE) 500 MG tablet Take 2 tablets (1,000 mg) by mouth 2 times daily (with meals) 360 tablet 3     pravastatin (PRAVACHOL) 80 MG tablet Take 1 tablet (80 mg) by mouth daily 90 tablet 3     sildenafil (REVATIO) 20 MG tablet TAKE 2 TO 4 TABLETS BY MOUTH AT A TIME FOR PLANNED SEXUAL ACTIVITY. MAX 5 TABLETS PER DAY. 30 tablet 0     Bromfenac Sodium POWD 1 Bottle 4 times daily 1 drop four times a day in the operative eye starting 1 day before surgery. Use until the bottle runs out. (Patient not taking: Reported on 10/19/2022) 1 g 1     Dexamethasone Acetate POWD 1 Bottle 4 times daily 1 drop four times a day in the operative eye starting 1 day before surgery. Use until the bottle runs out. (Patient not taking: Reported on 10/19/2022) 1 g 1     Moxifloxacin HCl POWD 1 Bottle 4 times daily 1 drop four times a day in the operative eye starting 1 day before surgery. Use until the bottle runs out. (Patient not taking: Reported on 10/19/2022) 1 g 1       Allergies   Allergen Reactions     Lipitor [Atorvastatin Calcium]      Rash from lipitor     Hay Fever & [A.R.M.]         Social History     Tobacco Use      Smoking status: Former     Packs/day: 0.50     Years: 7.00     Pack years: 3.50     Types: Cigarettes     Start date: 1972     Quit date: 1979     Years since quittin.8     Smokeless tobacco: Never   Substance Use Topics     Alcohol use: Yes     Comment: occasionally     Family History   Problem Relation Age of Onset     Breast Cancer Mother      Diabetes Father      Diabetes Maternal Grandmother      Congenital Anomalies Sister      Psychotic Disorder Daughter      Glaucoma No family hx of      Macular Degeneration No family hx of      History   Drug Use No         Objective     /71   Pulse 76   Temp 97  F (36.1  C) (Oral)   Wt 93.9 kg (207 lb)   SpO2 98%   BMI 21.23 kg/m      Physical Exam  GENERAL APPEARANCE: healthy, alert and no distress  HENT: ear canals and TM's normal and nose and mouth without ulcers or lesions  RESP: lungs clear to auscultation - no rales, rhonchi or wheezes  CV: regular rate and rhythm, normal S1 S2, no S3 or S4 and no murmur, click or rub   ABDOMEN: soft, nontender, no HSM or masses and bowel sounds normal  NEURO: Normal strength and tone, sensory exam grossly normal, mentation intact and speech normal    Recent Labs   Lab Test 22  0725 22  0742 21  0922   HGB  --   --  13.6     --  141   POTASSIUM 4.4  --  4.7   CR 1.08  --  0.94   A1C 6.6* 6.7* 6.4*        Diagnostics:  No labs were ordered during this visit.   No EKG required for low risk surgery (cataract, skin procedure, breast biopsy, etc).    Revised Cardiac Risk Index (RCRI):  The patient has the following serious cardiovascular risks for perioperative complications:   - Diabetes Mellitus (on Insulin) = 1 point     RCRI Interpretation: 1 point: Class II (low risk - 0.9% complication rate)           Signed Electronically by: Yuliya Almonte PA-C  Copy of this evaluation report is provided to requesting physician.

## 2022-10-19 NOTE — PATIENT INSTRUCTIONS
Preparing for Your Surgery  Getting started  A nurse will call you to review your health history and instructions. They will give you an arrival time based on your scheduled surgery time. Please be ready to share:    Your doctor's clinic name and phone number    Your medical, surgical and anesthesia history    A list of allergies and sensitivities    A list of medicines, including herbal treatments and over-the-counter drugs    Whether the patient has a legal guardian (ask how to send us the papers in advance)  Please tell us if you're pregnant--or if there's any chance you might be pregnant. Some surgeries may injure a fetus (unborn baby), so they require a pregnancy test. Surgeries that are safe for a fetus don't always need a test, and you can choose whether to have one.   If you have a child who's having surgery, please ask for a copy of Preparing for Your Child's Surgery.    Preparing for surgery    Within 10 to 30 days of surgery: Have a pre-op exam (sometimes called an H&P, or History and Physical). This can be done at a clinic or pre-operative center.  ? If you're having a , you may not need this exam. Talk to your care team.    At your pre-op exam, talk to your care team about all medicines you take. If you need to stop any medicines before surgery, ask when to start taking them again.  ? We do this for your safety. Many medicines can make you bleed too much during surgery. Some change how well surgery (anesthesia) drugs work.    Call your insurance company to let them know you're having surgery. (If you don't have insurance, call [phone].)    Call your clinic if there's any change in your health. This includes signs of a cold or flu (sore throat, runny nose, cough, rash, fever). It also includes a scrape or scratch near the surgery site.    If you have questions on the day of surgery, call your hospital or surgery center.  COVID testing  You may need to be tested for COVID-19 before having  surgery. If so, we will give you instructions (or click here).  Eating and drinking guidelines  For your safety: Unless your surgeon tells you otherwise, follow the guidelines below.    Eat and drink as usual until 8 hours before surgery. After that, no food or milk.    Drink clear liquids until 2 hours before surgery. These are liquids you can see through, like water, Gatorade and Propel Water. You may also have black coffee and tea (no cream or milk).    Nothing by mouth within 2 hours of surgery. This includes gum, candy and breath mints.    If you drink alcohol: Stop drinking it the night before surgery.    If your care team tells you to take medicine on the morning of surgery, it's okay to take it with a sip of water.  Preventing infection    Shower or bathe the night before and morning of your surgery. Follow the instructions your clinic gave you. (If no instructions, use regular soap.)    Don't shave or clip hair near your surgery site. We'll remove the hair if needed.    Don't smoke or vape the morning of surgery. You may chew nicotine gum up to 2 hours before surgery. A nicotine patch is okay.  ? Note: Some surgeries require you to completely quit smoking and nicotine. Check with your surgeon.    Your care team will make every effort to keep you safe from infection. We will:  ? Clean our hands often with soap and water (or an alcohol-based hand rub).  ? Clean the skin at your surgery site with a special soap that kills germs.  ? Give you a special gown to keep you warm. (Cold raises the risk of infection.)  ? Wear special hair covers, masks, gowns and gloves during surgery.  ? Give antibiotic medicine, if prescribed. Not all surgeries need antibiotics.  What to bring on the day of surgery    Photo ID and insurance card    Copy of your health care directive, if you have one    Glasses and hearing aides (bring cases)  ? You can't wear contacts during surgery    Inhaler and eye drops, if you use them (tell us  about these when you arrive)    CPAP machine or breathing device, if you use them    A few personal items, if spending the night    If you have . . .  ? A pacemaker, ICD (cardiac defibrillator) or other implant: Bring the ID card.  ? An implanted stimulator: Bring the remote control.  ? A legal guardian: Bring a copy of the certified (court-stamped) guardianship papers.  Please remove any jewelry, including body piercings. Leave jewelry and other valuables at home.  If you're going home the day of surgery    You must have a responsible adult drive you home. They should stay with you overnight as well.    If you don't have someone to stay with you, and you aren't safe to go home alone, we may keep you overnight. Insurance often won't pay for this.  After surgery  If it's hard to control your pain or you need more pain medicine, please call your surgeon's office.  Questions?   If you have any questions for your care team, list them here: _________________________________________________________________________________________________________________________________________________________________________ ____________________________________ ____________________________________ ____________________________________  For informational purposes only. Not to replace the advice of your health care provider. Copyright   2003, 2019 [facility]. All rights reserved. Clinically reviewed by Ольга Lima MD. GILUPI [id number] - REV 07/22.    How to Take Your Medication Before Surgery  - HOLD (do not take) your METFORMIN on the morning of surgery.  - HOLD (do not take) novolog and only 2 units of lantus the night before

## 2022-10-21 ENCOUNTER — ANESTHESIA EVENT (OUTPATIENT)
Dept: SURGERY | Facility: AMBULATORY SURGERY CENTER | Age: 72
End: 2022-10-21
Payer: COMMERCIAL

## 2022-10-24 ENCOUNTER — HOSPITAL ENCOUNTER (OUTPATIENT)
Facility: AMBULATORY SURGERY CENTER | Age: 72
Discharge: HOME | End: 2022-10-24
Attending: STUDENT IN AN ORGANIZED HEALTH CARE EDUCATION/TRAINING PROGRAM | Admitting: STUDENT IN AN ORGANIZED HEALTH CARE EDUCATION/TRAINING PROGRAM
Payer: COMMERCIAL

## 2022-10-24 ENCOUNTER — ANESTHESIA (OUTPATIENT)
Dept: SURGERY | Facility: AMBULATORY SURGERY CENTER | Age: 72
End: 2022-10-24
Payer: COMMERCIAL

## 2022-10-24 VITALS
TEMPERATURE: 97.5 F | DIASTOLIC BLOOD PRESSURE: 68 MMHG | OXYGEN SATURATION: 95 % | SYSTOLIC BLOOD PRESSURE: 122 MMHG | RESPIRATION RATE: 16 BRPM

## 2022-10-24 LAB — GLUCOSE BLDC GLUCOMTR-MCNC: 161 MG/DL (ref 70–99)

## 2022-10-24 PROCEDURE — G8907 PT DOC NO EVENTS ON DISCHARG: HCPCS

## 2022-10-24 PROCEDURE — 66984 XCAPSL CTRC RMVL W/O ECP: CPT | Mod: RT | Performed by: STUDENT IN AN ORGANIZED HEALTH CARE EDUCATION/TRAINING PROGRAM

## 2022-10-24 PROCEDURE — 66984 XCAPSL CTRC RMVL W/O ECP: CPT | Mod: RT

## 2022-10-24 PROCEDURE — G8918 PT W/O PREOP ORDER IV AB PRO: HCPCS

## 2022-10-24 DEVICE — TECNIS 1-PC CLEAR MONO 6.0MM 11.5D
Type: IMPLANTABLE DEVICE | Site: EYE | Status: FUNCTIONAL
Brand: TECNIS IOL

## 2022-10-24 RX ORDER — LIDOCAINE 40 MG/G
CREAM TOPICAL
Status: DISCONTINUED | OUTPATIENT
Start: 2022-10-24 | End: 2022-10-25 | Stop reason: HOSPADM

## 2022-10-24 RX ORDER — SODIUM CHLORIDE, SODIUM LACTATE, POTASSIUM CHLORIDE, CALCIUM CHLORIDE 600; 310; 30; 20 MG/100ML; MG/100ML; MG/100ML; MG/100ML
INJECTION, SOLUTION INTRAVENOUS CONTINUOUS PRN
Status: DISCONTINUED | OUTPATIENT
Start: 2022-10-24 | End: 2022-10-24

## 2022-10-24 RX ORDER — ACETAMINOPHEN 325 MG/1
975 TABLET ORAL ONCE
Status: COMPLETED | OUTPATIENT
Start: 2022-10-24 | End: 2022-10-24

## 2022-10-24 RX ORDER — CYCLOPENTOLAT/TROPIC/PHENYLEPH 1%-1%-2.5%
1 DROPS (EA) OPHTHALMIC (EYE)
Status: COMPLETED | OUTPATIENT
Start: 2022-10-24 | End: 2022-10-24

## 2022-10-24 RX ORDER — SODIUM CHLORIDE, SODIUM LACTATE, POTASSIUM CHLORIDE, CALCIUM CHLORIDE 600; 310; 30; 20 MG/100ML; MG/100ML; MG/100ML; MG/100ML
INJECTION, SOLUTION INTRAVENOUS CONTINUOUS
Status: DISCONTINUED | OUTPATIENT
Start: 2022-10-24 | End: 2022-10-25 | Stop reason: HOSPADM

## 2022-10-24 RX ORDER — PROPARACAINE HYDROCHLORIDE 5 MG/ML
1 SOLUTION/ DROPS OPHTHALMIC ONCE
Status: COMPLETED | OUTPATIENT
Start: 2022-10-24 | End: 2022-10-24

## 2022-10-24 RX ORDER — TETRACAINE HYDROCHLORIDE 5 MG/ML
SOLUTION OPHTHALMIC PRN
Status: DISCONTINUED | OUTPATIENT
Start: 2022-10-24 | End: 2022-10-24 | Stop reason: HOSPADM

## 2022-10-24 RX ORDER — FENTANYL CITRATE 50 UG/ML
INJECTION, SOLUTION INTRAMUSCULAR; INTRAVENOUS PRN
Status: DISCONTINUED | OUTPATIENT
Start: 2022-10-24 | End: 2022-10-24

## 2022-10-24 RX ORDER — MOXIFLOXACIN IN NACL,ISO-OS/PF 0.3MG/0.3
SYRINGE (ML) INTRAOCULAR PRN
Status: DISCONTINUED | OUTPATIENT
Start: 2022-10-24 | End: 2022-10-24 | Stop reason: HOSPADM

## 2022-10-24 RX ADMIN — PROPARACAINE HYDROCHLORIDE 1 DROP: 5 SOLUTION/ DROPS OPHTHALMIC at 08:03

## 2022-10-24 RX ADMIN — SODIUM CHLORIDE, SODIUM LACTATE, POTASSIUM CHLORIDE, CALCIUM CHLORIDE: 600; 310; 30; 20 INJECTION, SOLUTION INTRAVENOUS at 08:50

## 2022-10-24 RX ADMIN — Medication 1 DROP: at 08:08

## 2022-10-24 RX ADMIN — SODIUM CHLORIDE, SODIUM LACTATE, POTASSIUM CHLORIDE, CALCIUM CHLORIDE: 600; 310; 30; 20 INJECTION, SOLUTION INTRAVENOUS at 08:20

## 2022-10-24 RX ADMIN — FENTANYL CITRATE 25 MCG: 50 INJECTION, SOLUTION INTRAMUSCULAR; INTRAVENOUS at 09:12

## 2022-10-24 RX ADMIN — ACETAMINOPHEN 975 MG: 325 TABLET ORAL at 07:53

## 2022-10-24 RX ADMIN — Medication 1 DROP: at 08:03

## 2022-10-24 RX ADMIN — Medication 1 DROP: at 08:13

## 2022-10-24 NOTE — ANESTHESIA CARE TRANSFER NOTE
Patient: Felipe Campbell    Procedure: Procedure(s):  RIGHT PHACOEMULSIFICATION, CATARACT, WITH INTRAOCULAR LENS IMPLANT       Diagnosis: Combined form of age-related cataract, right eye [H25.811]  Diagnosis Additional Information: No value filed.    Anesthesia Type:   MAC     Note:    Oropharynx: oropharynx clear of all foreign objects and spontaneously breathing  Level of Consciousness: awake  Oxygen Supplementation: room air    Independent Airway: airway patency satisfactory and stable  Dentition: dentition unchanged  Vital Signs Stable: post-procedure vital signs reviewed and stable  Report to RN Given: handoff report given  Patient transferred to: Phase II          Vitals:  Vitals Value Taken Time   BP     Temp 97.5    Pulse     Resp     SpO2         Electronically Signed By: LINDA Sipmson CRNA  October 24, 2022  9:23 AM

## 2022-10-24 NOTE — DISCHARGE INSTRUCTIONS
CATARACT SURGERY POST-OP INSTRUCTIONS  Dr. Concha Oglesby  [phone]      Continue using CatarActive3 four times a day in the operative eye.  You should get 3 doses in today and 4 doses daily starting tomorrow.     Keep the eye shield taped in place unless putting drops in. We will remove it for you in the office tomorrow.    Light sensitivity may be noticed. Sunglasses may be worn for comfort.    Do not rub the operated eye.    Keep the operated eye dry. You may wash your hair, bathe or shower, but keep the operated eye closed while doing so.     No swimming, hot tub, or sauna for 2 weeks.    No make up around eye for 5 days.    No bending at the waist or lifting more than 10 pounds for one week.    May take Tylenol (per directions on bottle) for mild pain.    Call the office at [phone] and ask to speak to the on-call ophthalmologist  if any of the following should occur:  Any sudden vision changes  Nausea or severe headache  Increase in pain not controlled  Or signs of infection (pus, increasing redness or tenderness)      [facility]   Adult Discharge Orders & Instructions     For 24 hours after surgery    Get plenty of rest.  A responsible adult must stay with you for at least 24 hours after you leave the hospital.   Do not drive or use heavy equipment.  If you have weakness or tingling, don't drive or use heavy equipment until this feeling goes away.  Do not drink alcohol.  Avoid strenuous or risky activities.  Ask for help when climbing stairs.   You may feel lightheaded.  IF so, sit for a few minutes before standing.  Have someone help you get up.   If you have nausea (feel sick to your stomach): Drink only clear liquids such as apple juice, ginger ale, broth or 7-Up.  Rest may also help.  Be sure to drink enough fluids.  Move to a regular diet as you feel able.  You may have a slight fever. Call the doctor if your fever is over 100 F (37.7 C) (taken under the tongue) or lasts longer  than 24 hours.  You may have a dry mouth, a sore throat, muscle aches or trouble sleeping.  These should go away after 24 hours.  Do not make important or legal decisions.     Call your doctor for any of the followin.  Signs of infection (fever, growing tenderness at the surgery site, a large amount of drainage or bleeding, severe pain, foul-smelling drainage, redness, swelling).    2. It has been over 8 to 10 hours since surgery and you are still not able to urinate (pass water).    3.  Headache for over 24 hours.    4.  Numbness, tingling or weakness the day after surgery (if you had spinal anesthesia).                  5. Signs of Covid-19 infection (temperature over 100 degrees, shortness of breath, cough, loss of taste/smell, generalized body aches, persistent headache,                  chills, sore throat, nausea/vomiting/diarrhea).        Tylenol 975 mg was given at 7:50 am.    You should not take more then 4,000 mg of tylenol/acetaminophen in a 24 hour period.

## 2022-10-24 NOTE — ANESTHESIA POSTPROCEDURE EVALUATION
Patient: Felipe Campbell    Procedure: Procedure(s):  COMPLEX RIGHT PHACOEMULSIFICATION, CATARACT, WITH INTRAOCULAR LENS IMPLANT       Anesthesia Type:  MAC    Note:     Postop Pain Control: Uneventful            Sign Out: Well controlled pain   PONV: No   Neuro/Psych: Uneventful            Sign Out: Acceptable/Baseline neuro status   Airway/Respiratory: Uneventful            Sign Out: Acceptable/Baseline resp. status   CV/Hemodynamics: Uneventful            Sign Out: Acceptable CV status; No obvious hypovolemia; No obvious fluid overload   Other NRE: NONE   DID A NON-ROUTINE EVENT OCCUR? No           Last vitals:  Vitals Value Taken Time   /68 10/24/22 0940   Temp 97.5  F (36.4  C) 10/24/22 0940   Pulse     Resp 16 10/24/22 0940   SpO2 95 % 10/24/22 0940       Electronically Signed By: Torey Joseph MD  October 24, 2022  10:23 AM

## 2022-10-24 NOTE — OP NOTE
PreOp Diagnosis: Visually significant nuclear sclerotic cataract right eye, cortical cataract necessitating Trypan Blue right eye  PostOp Diagnosis: Same  Surgeon: Concha Oglesby MD  Implant: Tecnis [id number] +11.5D  Procedures:   1. Review of intraocular lens calculations, both eyes   2. Phacoemulsification and extraction of lens  right eye   3. Intraocular lens implantation right eye  Anesthesia: MAC/topical  Complications: None  EBL: <1cc    Felipe Campbell suffers from a visually significant cataract of the right eye. This has caused problems with distance and reading vision, including glare. After discussing the risks, benefits, and alternatives, the patient wishes to proceed with cataract surgery.    The patient was identified in the pre-op area where the right eye was marked. The patient was then brought to the operating room where a time out was called, identifying the patient, the procedure, and the correct site. Tetracaine drops were applied to the operative eye. The operative eye was then prepped and draped in the usual sterile ophthalmic fashion. An eyelid speculum was placed into the operative eye. Additional tetracaine drops were applied. A paracentesis was made superior with a side port blade. 1% preservative-free lidocaine and epinephrine was injected into the anterior chamber. Due to obscured red reflex, trypan blue was irrigated into the anterior chamber to enhance capsular visualization.  This was irrigated from the anterior chamber after 60 seconds with balanced salt solution.    Endocoat was injected to deepen the anterior chamber. A 2.4mm clear corneal wound was created with a keratome blade temporally. A continuous curvilinear capsulorrhexis was started with a bent cystitome and completed with Utrada forceps. Hydrodissection of the lens nucleus was performed with BSS on a cannula. The lens nucleus was rotated. Phacoemulsification of the lens nucleus was accomplished in a phacoemulsification  stop and chop technique. Remaining cortex was removed with irrigation and aspiration. The lens capsule was noted to be intact. Healon was used to inflate the capsular bag.  The lens was injected into the capsular bag. Remaining viscoelastic was removed with irrigation and aspiration. The wounds were checked and found to be watertight after hydration.  Intracameral moxifloxacin was injected into the anterior chamber. The eyelid speculum was removed. The patient tolerated the procedure well and was in stable condition on the way to the recovery area.     Concha Oglesby MD

## 2022-10-24 NOTE — ANESTHESIA PREPROCEDURE EVALUATION
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     Hayfever     summer and fall     Hyperlipidemia LDL goal <100 10/31/2010     Microalbuminuria 2011     Nonsenile cataract       Past Surgical History:   Procedure Laterality Date     COLONOSCOPY  2016     COLONOSCOPY WITH CO2 INSUFFLATION N/A 2016    Procedure: COLONOSCOPY WITH CO2 INSUFFLATION;  Surgeon: Torey Almazan MD;  Location: [facility]     HERNIA REPAIR, INGUINAL RT/LT  age 5    left     TUNA       ZZC APPENDECTOMY  71      Allergies   Allergen Reactions     Lipitor [Atorvastatin Calcium]      Rash from lipitor     Hay Fever & [A.R.M.]       Social History     Tobacco Use     Smoking status: Former     Packs/day: 0.50     Years: 7.00     Pack years: 3.50     Types: Cigarettes     Start date: 1972     Quit date: 1979     Years since quittin.8     Smokeless tobacco: Never   Substance Use Topics     Alcohol use: Yes     Comment: occasionally      Wt Readings from Last 1 Encounters:   10/19/22 93.9 kg (207 lb)        Anesthesia Evaluation   Pt has had prior anesthetic. Type: General and MAC.        ROS/MED HX  ENT/Pulmonary:  - neg pulmonary ROS     Neurologic:  - neg neurologic ROS     Cardiovascular:     (+) Dyslipidemia hypertension-----    METS/Exercise Tolerance:     Hematologic:  - neg hematologic  ROS     Musculoskeletal:   (+) arthritis,     GI/Hepatic:  - neg GI/hepatic ROS     Renal/Genitourinary:     (+) renal disease, type: CRI,     Endo:     (+) type I DM, Last HgA1c: 6.6,     Psychiatric/Substance Use:  - neg psychiatric ROS     Infectious Disease:       Malignancy:       Other:            Physical Exam    Airway  airway exam  normal           Respiratory Devices and Support         Dental  no notable dental history         Cardiovascular   cardiovascular exam normal          Pulmonary   pulmonary exam normal                OUTSIDE LABS:  CBC:   Lab Results   Component Value Date    WBC 6.1 06/14/2011    HGB 13.6 07/06/2021    HGB 14.0 06/27/2018    HCT 43.9 06/14/2011     06/14/2011     BMP:   Lab Results   Component Value Date     08/22/2022     07/06/2021    POTASSIUM 4.4 08/22/2022    POTASSIUM 4.7 07/06/2021    CHLORIDE 106 08/22/2022    CHLORIDE 110 (H) 07/06/2021    CO2 27 08/22/2022    CO2 27 07/06/2021    BUN 21 08/22/2022    BUN 17 07/06/2021    CR 1.08 08/22/2022    CR 0.94 07/06/2021     (H) 08/22/2022     (H) 07/06/2021     COAGS: No results found for: PTT, INR, FIBR  POC:   Lab Results   Component Value Date     (H) 11/08/2016     HEPATIC:   Lab Results   Component Value Date    ALBUMIN 3.9 12/12/2017    PROTTOTAL 7.0 12/12/2017    ALT 23 12/12/2017    AST 12 12/12/2017    ALKPHOS 61 12/12/2017    BILITOTAL 0.6 12/12/2017     OTHER:   Lab Results   Component Value Date    A1C 6.6 (H) 08/22/2022    BALTA 9.3 08/22/2022    TSH 2.58 06/27/2018       Anesthesia Plan    ASA Status:  3      Anesthesia Type: MAC.     - Reason for MAC: immobility needed, straight local not clinically adequate      Maintenance: TIVA.        Consents    Anesthesia Plan(s) and associated risks, benefits, and realistic alternatives discussed. Questions answered and patient/representative(s) expressed understanding.     - Discussed: Risks, Benefits and Alternatives for BOTH SEDATION and the PROCEDURE were discussed     - Discussed with:  Patient      - Extended Intubation/Ventilatory Support Discussed: No.      - Patient is DNR/DNI Status: No    Use of blood products discussed: No .     Postoperative Care            Comments:                Torey Joseph MD

## 2022-10-25 ENCOUNTER — OFFICE VISIT (OUTPATIENT)
Dept: OPHTHALMOLOGY | Facility: CLINIC | Age: 72
End: 2022-10-25
Payer: COMMERCIAL

## 2022-10-25 DIAGNOSIS — Z96.1 PSEUDOPHAKIA: Primary | ICD-10-CM

## 2022-10-25 PROCEDURE — 99024 POSTOP FOLLOW-UP VISIT: CPT | Performed by: STUDENT IN AN ORGANIZED HEALTH CARE EDUCATION/TRAINING PROGRAM

## 2022-10-25 ASSESSMENT — VISUAL ACUITY
METHOD: SNELLEN - LINEAR
OS_SC: 20/400
OD_SC: 20/20

## 2022-10-25 ASSESSMENT — SLIT LAMP EXAM - LIDS
COMMENTS: NORMAL
COMMENTS: NORMAL

## 2022-10-25 ASSESSMENT — TONOMETRY
OD_IOP_MMHG: 21
IOP_METHOD: APPLANATION

## 2022-10-25 ASSESSMENT — EXTERNAL EXAM - RIGHT EYE: OD_EXAM: 2+ BROW PTOSIS, MILD TO MOD BROW

## 2022-10-25 ASSESSMENT — EXTERNAL EXAM - LEFT EYE: OS_EXAM: 2+ BROW PTOSIS, MILD TO MOD BROW

## 2022-10-25 NOTE — PATIENT INSTRUCTIONS
POST-OP CATARACT INSTRUCTIONS    *   Use the following drop(s) in the RIGHT EYE four times a day:        continue CatarActive3 four times a day until the bottle runs out.    *   Wear eye shield over the right eye when sleeping for one week. Do not rub the operated eye.     *   No bending or lifting more than 10 pounds for one week.    *   Keep water out of eye for two weeks.    *   OK to resume aspirin and/or other blood thinners if you stopped.     *   If your vision worsens, eye becomes increasingly red, or becomes painful, call [phone].     Concha Oglesby M.D.

## 2022-10-25 NOTE — PROGRESS NOTES
Current Eye Medications:  Cataractive 3 - right eye QID     Subjective:  1 day right eye KPE/IOL - had some trouble sleeping last night due to restlessness - no pain or discomfort.     Objective:  See Ophthalmology Exam.       Assessment:  Felipe Campbell is a 72 year old male who presents with:   Encounter Diagnosis   Name Primary?     Pseudophakia - Right Eye Yes    POD1 s/p CE/IOL right eye - some corneal edema temp extending into visual axis.       Plan:  POST-OP CATARACT INSTRUCTIONS    *   Use the following drop(s) in the RIGHT EYE four times a day:        continue CatarActive3 four times a day until the bottle runs out.    *   Wear eye shield over the right eye when sleeping for one week. Do not rub the operated eye.     *   No bending or lifting more than 10 pounds for one week.    *   Keep water out of eye for two weeks.    *   OK to resume aspirin and/or other blood thinners if you stopped.     *   If your vision worsens, eye becomes increasingly red, or becomes painful, call [phone].     Concha Oglesby M.D.

## 2022-10-25 NOTE — LETTER
10/25/2022         RE: Felipe Campbell  [address] [place] [address]        Dear Colleague,    Thank you for referring your patient, Felipe Campbell, to the [facility]. Please see a copy of my visit note below.     Current Eye Medications:  Cataractive 3 - right eye QID     Subjective:  1 day right eye KPE/IOL - had some trouble sleeping last night due to restlessness - no pain or discomfort.     Objective:  See Ophthalmology Exam.       Assessment:  Felipe Campbell is a 72 year old male who presents with:   Encounter Diagnosis   Name Primary?     Pseudophakia - Right Eye Yes    POD1 s/p CE/IOL right eye - some corneal edema temp extending into visual axis.       Plan:  POST-OP CATARACT INSTRUCTIONS    *   Use the following drop(s) in the RIGHT EYE four times a day:        continue CatarActive3 four times a day until the bottle runs out.    *   Wear eye shield over the right eye when sleeping for one week. Do not rub the operated eye.     *   No bending or lifting more than 10 pounds for one week.    *   Keep water out of eye for two weeks.    *   OK to resume aspirin and/or other blood thinners if you stopped.     *   If your vision worsens, eye becomes increasingly red, or becomes painful, call [phone].     Concha Oglesby M.D.            Again, thank you for allowing me to participate in the care of your patient.        Sincerely,        Concha Oglesby MD

## 2022-11-03 ENCOUNTER — OFFICE VISIT (OUTPATIENT)
Dept: OPHTHALMOLOGY | Facility: CLINIC | Age: 72
End: 2022-11-03
Payer: COMMERCIAL

## 2022-11-03 DIAGNOSIS — H25.812 COMBINED FORM OF AGE-RELATED CATARACT, LEFT EYE: ICD-10-CM

## 2022-11-03 DIAGNOSIS — Z96.1 PSEUDOPHAKIA: Primary | ICD-10-CM

## 2022-11-03 PROCEDURE — 92136 OPHTHALMIC BIOMETRY: CPT | Mod: 26 | Performed by: STUDENT IN AN ORGANIZED HEALTH CARE EDUCATION/TRAINING PROGRAM

## 2022-11-03 PROCEDURE — 99024 POSTOP FOLLOW-UP VISIT: CPT | Performed by: STUDENT IN AN ORGANIZED HEALTH CARE EDUCATION/TRAINING PROGRAM

## 2022-11-03 ASSESSMENT — REFRACTION_MANIFEST
OD_AXIS: 170
OD_ADD: +2.50
OD_CYLINDER: +0.75
OD_SPHERE: -0.25

## 2022-11-03 ASSESSMENT — EXTERNAL EXAM - RIGHT EYE: OD_EXAM: 2+ BROW PTOSIS, MILD TO MOD BROW

## 2022-11-03 ASSESSMENT — TONOMETRY
OS_IOP_MMHG: 19
IOP_METHOD: APPLANATION
OD_IOP_MMHG: 17

## 2022-11-03 ASSESSMENT — VISUAL ACUITY
OS_PH_SC: 20/70
METHOD: SNELLEN - LINEAR
OD_SC: 20/20
OD_SC+: -1
OS_SC: 20/300

## 2022-11-03 ASSESSMENT — SLIT LAMP EXAM - LIDS
COMMENTS: NORMAL
COMMENTS: NORMAL

## 2022-11-03 ASSESSMENT — EXTERNAL EXAM - LEFT EYE: OS_EXAM: 2+ BROW PTOSIS, MILD TO MOD BROW

## 2022-11-03 NOTE — PATIENT INSTRUCTIONS
POST-OP CATARACT INSTRUCTIONS for the RIGHT EYE:    *   Use the following eye drop(s) four times a day until the bottle runs out:   continue CatarActive3 four times a day until the bottle runs out.    *   Stop wearing eye shield.    *   No eye rubbing. May resume heavy lifting.    *   If your vision worsens, eye becomes increasingly red, or becomes painful, call [phone].      PRE-OP CATARACT INSTRUCTIONS for the LEFT EYE:    *Use the drops in the left eye 4 times on the day before surgery and once the morning of surgery:                                   CatarActive3 (dark blue cap)    *Please bring all your eyedrops to the surgery center.    *No solid food or drink after midnight.     *If you are diabetic, please do not take any diabetic medications the morning of surgery.    Concha Oglesby MD  [phone]

## 2022-11-03 NOTE — PROGRESS NOTES
Current Eye Medications:  CatarActive3 four times a day left eye      Subjective:  Pre-op, KPE/IOL left eye 11/14/22. S/P KPE/IOL right eye 10/24/22. Vision is doing great right eye, colors much improved. White looks white, not yellow. Vision is not sharp and yellow cast to it. Sees almost a real fine web left eye if bright out. No eye pain or discomfort in either eye.     Objective:  See Ophthalmology Exam.       Assessment:  Felipe Campbell is a 72 year old male who presents with:   Encounter Diagnoses   Name Primary?     Pseudophakia - Right Eye Yes    POW1 s/p CE/IOL right eye - doing well.       Combined form of age-related cataract, left eye Cataract pre-op left eye. Dil 6.5. CA3. Possible Trypan. Target mostly distance left eye. Was on Flomax shortly at one point.       Plan:  POST-OP CATARACT INSTRUCTIONS for the RIGHT EYE:    *   Use the following eye drop(s) four times a day until the bottle runs out:   continue CatarActive3 four times a day until the bottle runs out.    *   Stop wearing eye shield.    *   No eye rubbing. May resume heavy lifting.    *   If your vision worsens, eye becomes increasingly red, or becomes painful, call [phone].      PRE-OP CATARACT INSTRUCTIONS for the LEFT EYE:    *Use the drops in the left eye 4 times on the day before surgery and once the morning of surgery:                                   CatarActive3 (dark blue cap)    *Please bring all your eyedrops to the surgery center.    *No solid food or drink after midnight.     *If you are diabetic, please do not take any diabetic medications the morning of surgery.    Concha Oglesby MD  [phone]

## 2022-11-03 NOTE — LETTER
11/3/2022         RE: Felipe Campbell  [address]  [place] [address]        Dear Colleague,    Thank you for referring your patient, Felipe Campbell, to the [facility]. Please see a copy of my visit note below.     Current Eye Medications:  CatarActive3 four times a day left eye      Subjective:  Pre-op, KPE/IOL left eye 11/14/22. S/P KPE/IOL right eye 10/24/22. Vision is doing great right eye, colors much improved. White looks white, not yellow. Vision is not sharp and yellow cast to it. Sees almost a real fine web left eye if bright out. No eye pain or discomfort in either eye.     Objective:  See Ophthalmology Exam.       Assessment:  Felipe Campbell is a 72 year old male who presents with:   Encounter Diagnoses   Name Primary?     Pseudophakia - Right Eye Yes    POW1 s/p CE/IOL right eye - doing well.       Combined form of age-related cataract, left eye Cataract pre-op left eye. Dil 6.5. CA3. Possible Trypan. Target mostly distance left eye. Was on Flomax shortly at one point.       Plan:  POST-OP CATARACT INSTRUCTIONS for the RIGHT EYE:    *   Use the following eye drop(s) four times a day until the bottle runs out:   continue CatarActive3 four times a day until the bottle runs out.    *   Stop wearing eye shield.    *   No eye rubbing. May resume heavy lifting.    *   If your vision worsens, eye becomes increasingly red, or becomes painful, call [phone].      PRE-OP CATARACT INSTRUCTIONS for the LEFT EYE:    *Use the drops in the left eye 4 times on the day before surgery and once the morning of surgery:                                   CatarActive3 (dark blue cap)    *Please bring all your eyedrops to the surgery center.    *No solid food or drink after midnight.     *If you are diabetic, please do not take any diabetic medications the morning of surgery.    Concha Oglesby MD  [phone]              Again, thank you for allowing me to participate in the  care of your patient.        Sincerely,        Concha Oglesby MD

## 2022-11-11 ENCOUNTER — ANESTHESIA EVENT (OUTPATIENT)
Dept: SURGERY | Facility: AMBULATORY SURGERY CENTER | Age: 72
End: 2022-11-11
Payer: COMMERCIAL

## 2022-11-11 NOTE — ANESTHESIA PREPROCEDURE EVALUATION
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 Oglesby     COLONOSCOPY  2016     COLONOSCOPY WITH CO2 INSUFFLATION N/A 2016    Procedure: COLONOSCOPY WITH CO2 INSUFFLATION;  Surgeon: Torey Almazan MD;  Location: [facility] OR     HERNIA REPAIR, INGUINAL RT/LT  age 5    left     PHACOEMULSIFICATION CLEAR CORNEA WITH STANDARD INTRAOCULAR LENS IMPLANT Right 10/24/2022    Procedure: COMPLEX RIGHT PHACOEMULSIFICATION, CATARACT, WITH INTRAOCULAR LENS IMPLANT;  Surgeon: Concha Oglesby MD;  Location: [facility] OR     TUNA       Z APPENDECTOMY  1969      Allergies   Allergen Reactions     Lipitor [Atorvastatin Calcium]      Rash from lipitor     Hay Fever & [A.R.M.]       Social History     Tobacco Use     Smoking status: Former     Packs/day: 0.50     Years: 7.00     Pack years: 3.50     Types: Cigarettes     Start date: 1972     Quit date: 1979     Years since quittin.8     Smokeless tobacco: Never   Substance Use Topics     Alcohol use: Yes     Comment: occasionally      Wt Readings from Last 1 Encounters:   10/19/22 93.9 kg (207 lb)        Anesthesia Evaluation   Pt has had prior anesthetic. Type: General and MAC.        ROS/MED HX  ENT/Pulmonary: Comment: Nonsenile cataract      Neurologic:  - neg neurologic ROS     Cardiovascular:     (+) Dyslipidemia hypertension-----    METS/Exercise Tolerance:     Hematologic:  - neg  hematologic  ROS     Musculoskeletal:   (+) arthritis,     GI/Hepatic:  - neg GI/hepatic ROS     Renal/Genitourinary:     (+) renal disease, type: CRI, BPH,     Endo:     (+) type I DM, Last HgA1c: 6.6,     Psychiatric/Substance Use:  - neg psychiatric ROS     Infectious Disease:       Malignancy:       Other:            Physical Exam    Airway  airway exam normal           Respiratory Devices and Support         Dental  no notable dental history         Cardiovascular   cardiovascular exam normal          Pulmonary   pulmonary exam normal                OUTSIDE LABS:  CBC:   Lab Results   Component Value Date    WBC 6.1 06/14/2011    HGB 13.6 07/06/2021    HGB 14.0 06/27/2018    HCT 43.9 06/14/2011     06/14/2011     BMP:   Lab Results   Component Value Date     08/22/2022     07/06/2021    POTASSIUM 4.4 08/22/2022    POTASSIUM 4.7 07/06/2021    CHLORIDE 106 08/22/2022    CHLORIDE 110 (H) 07/06/2021    CO2 27 08/22/2022    CO2 27 07/06/2021    BUN 21 08/22/2022    BUN 17 07/06/2021    CR 1.08 08/22/2022    CR 0.94 07/06/2021     (H) 10/24/2022     (H) 08/22/2022     COAGS: No results found for: PTT, INR, FIBR  POC:   Lab Results   Component Value Date     (H) 11/08/2016     HEPATIC:   Lab Results   Component Value Date    ALBUMIN 3.9 12/12/2017    PROTTOTAL 7.0 12/12/2017    ALT 23 12/12/2017    AST 12 12/12/2017    ALKPHOS 61 12/12/2017    BILITOTAL 0.6 12/12/2017     OTHER:   Lab Results   Component Value Date    A1C 6.6 (H) 08/22/2022    BALTA 9.3 08/22/2022    TSH 2.58 06/27/2018       Anesthesia Plan    ASA Status:  2   NPO Status:  NPO Appropriate    Anesthesia Type: MAC.     - Reason for MAC: straight local not clinically adequate   Induction: N/a.   Maintenance: N/A.        Consents    Anesthesia Plan(s) and associated risks, benefits, and realistic alternatives discussed. Questions answered and patient/representative(s) expressed understanding.    - Discussed:     -  Discussed with:  Patient    Use of blood products discussed: No .     Postoperative Care            Comments:           H&P reviewed: Unable to attach H&P to encounter due to EHR limitations. H&P Update: appropriate H&P reviewed, patient examined. No interval changes since H&P (within 30 days).         John Oquendo, DO

## 2022-11-14 ENCOUNTER — ANESTHESIA (OUTPATIENT)
Dept: SURGERY | Facility: AMBULATORY SURGERY CENTER | Age: 72
End: 2022-11-14
Payer: COMMERCIAL

## 2022-11-14 ENCOUNTER — HOSPITAL ENCOUNTER (OUTPATIENT)
Facility: AMBULATORY SURGERY CENTER | Age: 72
Discharge: HOME | End: 2022-11-14
Attending: STUDENT IN AN ORGANIZED HEALTH CARE EDUCATION/TRAINING PROGRAM | Admitting: STUDENT IN AN ORGANIZED HEALTH CARE EDUCATION/TRAINING PROGRAM
Payer: COMMERCIAL

## 2022-11-14 VITALS
DIASTOLIC BLOOD PRESSURE: 70 MMHG | SYSTOLIC BLOOD PRESSURE: 120 MMHG | OXYGEN SATURATION: 98 % | RESPIRATION RATE: 16 BRPM | TEMPERATURE: 98 F

## 2022-11-14 DIAGNOSIS — H25.813 COMBINED FORMS OF AGE-RELATED CATARACT OF BOTH EYES: Primary | ICD-10-CM

## 2022-11-14 LAB — GLUCOSE BLDC GLUCOMTR-MCNC: 132 MG/DL (ref 70–99)

## 2022-11-14 PROCEDURE — G8907 PT DOC NO EVENTS ON DISCHARG: HCPCS

## 2022-11-14 PROCEDURE — 66984 XCAPSL CTRC RMVL W/O ECP: CPT | Mod: LT

## 2022-11-14 PROCEDURE — G8918 PT W/O PREOP ORDER IV AB PRO: HCPCS

## 2022-11-14 PROCEDURE — 66984 XCAPSL CTRC RMVL W/O ECP: CPT | Mod: 79 | Performed by: STUDENT IN AN ORGANIZED HEALTH CARE EDUCATION/TRAINING PROGRAM

## 2022-11-14 DEVICE — TECNIS 1-PC CLEAR MONO 6.0MM 13.0D
Type: IMPLANTABLE DEVICE | Site: EYE | Status: FUNCTIONAL
Brand: TECNIS IOL

## 2022-11-14 RX ORDER — FENTANYL CITRATE 50 UG/ML
25 INJECTION, SOLUTION INTRAMUSCULAR; INTRAVENOUS EVERY 5 MIN PRN
Status: DISCONTINUED | OUTPATIENT
Start: 2022-11-14 | End: 2022-11-15 | Stop reason: HOSPADM

## 2022-11-14 RX ORDER — CYCLOPENTOLAT/TROPIC/PHENYLEPH 1%-1%-2.5%
1 DROPS (EA) OPHTHALMIC (EYE)
Status: COMPLETED | OUTPATIENT
Start: 2022-11-14 | End: 2022-11-14

## 2022-11-14 RX ORDER — TETRACAINE HYDROCHLORIDE 5 MG/ML
SOLUTION OPHTHALMIC PRN
Status: DISCONTINUED | OUTPATIENT
Start: 2022-11-14 | End: 2022-11-14 | Stop reason: HOSPADM

## 2022-11-14 RX ORDER — OXYCODONE HYDROCHLORIDE 5 MG/1
5 TABLET ORAL EVERY 4 HOURS PRN
Status: DISCONTINUED | OUTPATIENT
Start: 2022-11-14 | End: 2022-11-15 | Stop reason: HOSPADM

## 2022-11-14 RX ORDER — FENTANYL CITRATE 50 UG/ML
25 INJECTION, SOLUTION INTRAMUSCULAR; INTRAVENOUS
Status: DISCONTINUED | OUTPATIENT
Start: 2022-11-14 | End: 2022-11-15 | Stop reason: HOSPADM

## 2022-11-14 RX ORDER — ONDANSETRON 2 MG/ML
4 INJECTION INTRAMUSCULAR; INTRAVENOUS EVERY 30 MIN PRN
Status: DISCONTINUED | OUTPATIENT
Start: 2022-11-14 | End: 2022-11-15 | Stop reason: HOSPADM

## 2022-11-14 RX ORDER — MEPERIDINE HYDROCHLORIDE 25 MG/ML
12.5 INJECTION INTRAMUSCULAR; INTRAVENOUS; SUBCUTANEOUS
Status: DISCONTINUED | OUTPATIENT
Start: 2022-11-14 | End: 2022-11-15 | Stop reason: HOSPADM

## 2022-11-14 RX ORDER — LIDOCAINE 40 MG/G
CREAM TOPICAL
Status: DISCONTINUED | OUTPATIENT
Start: 2022-11-14 | End: 2022-11-15 | Stop reason: HOSPADM

## 2022-11-14 RX ORDER — ONDANSETRON 4 MG/1
4 TABLET, ORALLY DISINTEGRATING ORAL EVERY 30 MIN PRN
Status: DISCONTINUED | OUTPATIENT
Start: 2022-11-14 | End: 2022-11-15 | Stop reason: HOSPADM

## 2022-11-14 RX ORDER — PROPARACAINE HYDROCHLORIDE 5 MG/ML
1 SOLUTION/ DROPS OPHTHALMIC ONCE
Status: COMPLETED | OUTPATIENT
Start: 2022-11-14 | End: 2022-11-14

## 2022-11-14 RX ORDER — SODIUM CHLORIDE, SODIUM LACTATE, POTASSIUM CHLORIDE, CALCIUM CHLORIDE 600; 310; 30; 20 MG/100ML; MG/100ML; MG/100ML; MG/100ML
INJECTION, SOLUTION INTRAVENOUS CONTINUOUS
Status: DISCONTINUED | OUTPATIENT
Start: 2022-11-14 | End: 2022-11-15 | Stop reason: HOSPADM

## 2022-11-14 RX ORDER — MOXIFLOXACIN IN NACL,ISO-OS/PF 0.3MG/0.3
SYRINGE (ML) INTRAOCULAR PRN
Status: DISCONTINUED | OUTPATIENT
Start: 2022-11-14 | End: 2022-11-14 | Stop reason: HOSPADM

## 2022-11-14 RX ORDER — ACETAMINOPHEN 325 MG/1
975 TABLET ORAL ONCE
Status: COMPLETED | OUTPATIENT
Start: 2022-11-14 | End: 2022-11-14

## 2022-11-14 RX ADMIN — ACETAMINOPHEN 975 MG: 325 TABLET ORAL at 06:52

## 2022-11-14 RX ADMIN — Medication 1 DROP: at 07:07

## 2022-11-14 RX ADMIN — PROPARACAINE HYDROCHLORIDE 1 DROP: 5 SOLUTION/ DROPS OPHTHALMIC at 06:55

## 2022-11-14 RX ADMIN — Medication 1 DROP: at 07:00

## 2022-11-14 RX ADMIN — Medication 1 DROP: at 06:56

## 2022-11-14 RX ADMIN — SODIUM CHLORIDE, SODIUM LACTATE, POTASSIUM CHLORIDE, CALCIUM CHLORIDE: 600; 310; 30; 20 INJECTION, SOLUTION INTRAVENOUS at 07:52

## 2022-11-14 NOTE — ANESTHESIA CARE TRANSFER NOTE
Patient: Felipe Campbell    Procedure: Procedure(s):  LEFT PHACOEMULSIFICATION, CATARACT, WITH INTRAOCULAR LENS IMPLANT       Diagnosis: Combined form of age-related cataract, left eye [H25.812]  Diagnosis Additional Information: No value filed.    Anesthesia Type:   MAC     Note:      Level of Consciousness: awake  Oxygen Supplementation: room air    Independent Airway: airway patency satisfactory and stable  Dentition: dentition unchanged  Vital Signs Stable: post-procedure vital signs reviewed and stable  Report to RN Given: handoff report given  Patient transferred to: Phase II    Handoff Report: Identifed the Patient, Identified the Reponsible Provider, Reviewed the pertinent medical history, Discussed the surgical course, Reviewed Intra-OP anesthesia mangement and issues during anesthesia, Set expectations for post-procedure period and Allowed opportunity for questions and acknowledgement of understanding      Vitals:  Vitals Value Taken Time   /76    Temp 97    Pulse 63    Resp 10    SpO2 99        Electronically Signed By: LINDA Colunga CRNA  November 14, 2022  8:20 AM

## 2022-11-14 NOTE — ANESTHESIA POSTPROCEDURE EVALUATION
Patient: Felipe Campbell    Procedure: Procedure(s):  LEFT PHACOEMULSIFICATION, CATARACT, WITH INTRAOCULAR LENS IMPLANT       Anesthesia Type:  MAC    Note:  Disposition: Outpatient   Postop Pain Control: Uneventful            Sign Out: Well controlled pain   PONV: No   Neuro/Psych: Uneventful            Sign Out: Acceptable/Baseline neuro status   Airway/Respiratory: Uneventful            Sign Out: Acceptable/Baseline resp. status   CV/Hemodynamics: Uneventful            Sign Out: Acceptable CV status   Other NRE: NONE   DID A NON-ROUTINE EVENT OCCUR? No           Last vitals:  Vitals Value Taken Time   /70 11/14/22 0830   Temp 98  F (36.7  C) 11/14/22 0815   Pulse     Resp 16 11/14/22 0830   SpO2 98 % 11/14/22 0830       Electronically Signed By: John Oquendo DO  November 14, 2022  11:19 AM

## 2022-11-14 NOTE — DISCHARGE INSTRUCTIONS
CATARACT SURGERY POST-OP INSTRUCTIONS  Dr. Concha Oglesby  [phone]      Continue using CatarActive3 four times a day in the operative eye.  You should get 3 doses in today and 4 doses daily starting tomorrow.     Keep the eye shield taped in place unless putting drops in. We will remove it for you in the office tomorrow.    Light sensitivity may be noticed. Sunglasses may be worn for comfort.    Do not rub the operated eye.    Keep the operated eye dry. You may wash your hair, bathe or shower, but keep the operated eye closed while doing so.     No swimming, hot tub, or sauna for 2 weeks.    No make up around eye for 5 days.    No bending at the waist or lifting more than 10 pounds for one week.    May take Tylenol (per directions on bottle) for mild pain.    Call the office at [phone] and ask to speak to the on-call ophthalmologist  if any of the following should occur:  Any sudden vision changes  Nausea or severe headache  Increase in pain not controlled  Or signs of infection (pus, increasing redness or tenderness)  [facility]  Same-Day Surgery   Adult Discharge Orders & Instructions   For 24 hours after surgery  Get plenty of rest.  A responsible adult must stay with you for at least 24 hours after you leave the hospital.   Do not drive or use heavy equipment.  If you have weakness or tingling, don't drive or use heavy equipment until this feeling goes away.  Do not drink alcohol.  Avoid strenuous or risky activities.  Ask for help when climbing stairs.   You may feel lightheaded.  IF so, sit for a few minutes before standing.  Have someone help you get up.   If you have nausea (feel sick to your stomach): Drink only clear liquids such as apple juice, ginger ale, broth or 7-Up.  Rest may also help.  Be sure to drink enough fluids.  Move to a regular diet as you feel able.  You may have a slight fever. Call the doctor if your fever is over 100 F (37.7 C) (taken under the  tongue) or lasts longer than 24 hours.  You may have a dry mouth, a sore throat, muscle aches or trouble sleeping.  These should go away after 24 hours.  Do not make important or legal decisions.   Call your doctor for any of the followin.  Signs of infection (fever, growing tenderness at the surgery site, a large amount of drainage or bleeding, severe pain, foul-smelling drainage, redness, swelling).    2. It has been over 8 to 10 hours since surgery and you are still not able to urinate (pass water).    3.  Headache for over 24 hours.    4.  Numbness, tingling or weakness the day after surgery (if you had spinal anesthesia).  To contact a doctor, call ___________________________

## 2022-11-14 NOTE — OP NOTE
PreOp Diagnosis: Visually significant nuclear sclerotic cataract left eye  PostOp Diagnosis: Same  Surgeon: Concha Oglesby MD  Implant: Tecnis [id number] +13.0D    Procedures:   1. Review of intraocular lens calculations, both eyes   2. Phacoemulsification and extraction of lens  left eye   3. Intraocular lens implantation left eye  Anesthesia: MAC/topical  Complications: None  EBL: <1cc    Felipe Campbell suffers from a visually significant cataract of the left eye. This has caused problems with distance and reading vision, including glare. After discussing the risks, benefits, and alternatives, the patient wishes to proceed with cataract surgery.    The patient was identified in the pre-op area where the left eye was marked. The patient was then brought to the operating room where a time out was called, identifying the patient, the procedure, and the correct site. Tetracaine drops were applied to the operative eye. The operative eye was then prepped and draped in the usual sterile ophthalmic fashion. An eyelid speculum was placed into the operative eye. Additional tetracaine drops were applied. A paracentesis was made inferior with a side port blade. 1% preservative-free lidocaine and epinephrine was injected into the anterior chamber. Endocoat was injected to deepen the anterior chamber. A 2.4mm clear corneal wound was created with a keratome blade temporally. A continuous curvilinear capsulorrhexis was started with a bent cystitome and completed with Utrada forceps. Hydrodissection of the lens nucleus was performed with BSS on a cannula. The lens nucleus was rotated. Phacoemulsification of the lens nucleus was accomplished in a phacoemulsification stop and chop technique. Remaining cortex was removed with irrigation and aspiration. The lens capsule was noted to be intact. Healon was used to inflate the capsular bag.  The lens was injected into the capsular bag. Remaining viscoelastic was removed with irrigation  and aspiration. The wounds were checked and found to be watertight after hydration.  Intracameral moxifloxacin was injected into the anterior chamber. The eyelid speculum was removed. The patient tolerated the procedure well and was in stable condition on the way to the recovery area.     Concha Oglesby MD

## 2022-11-14 NOTE — INTERVAL H&P NOTE
I have reviewed the surgical (or preoperative) H&P that is linked to this encounter, and examined the patient. There are no significant changes    Clinical Conditions Present on Arrival:  Clinically Significant Risk Factors Present on Admission                    # DMII: A1C = 6.6 % (Ref range: 0.0 - 5.6 %) within past 3 months

## 2022-11-15 ENCOUNTER — OFFICE VISIT (OUTPATIENT)
Dept: OPHTHALMOLOGY | Facility: CLINIC | Age: 72
End: 2022-11-15
Payer: COMMERCIAL

## 2022-11-15 DIAGNOSIS — Z96.1 PSEUDOPHAKIA: Primary | ICD-10-CM

## 2022-11-15 PROCEDURE — 99024 POSTOP FOLLOW-UP VISIT: CPT | Performed by: STUDENT IN AN ORGANIZED HEALTH CARE EDUCATION/TRAINING PROGRAM

## 2022-11-15 ASSESSMENT — VISUAL ACUITY
OS_PH_SC+: -2
METHOD: SNELLEN - LINEAR
OS_SC+: +2
OS_SC: 20/50-2
OS_PH_SC: 20/20

## 2022-11-15 ASSESSMENT — EXTERNAL EXAM - LEFT EYE: OS_EXAM: 2+ BROW PTOSIS, MILD TO MOD BROW

## 2022-11-15 ASSESSMENT — SLIT LAMP EXAM - LIDS
COMMENTS: NORMAL
COMMENTS: NORMAL

## 2022-11-15 ASSESSMENT — TONOMETRY
IOP_METHOD: APPLANATION
OS_IOP_MMHG: 21
OD_IOP_MMHG: 16

## 2022-11-15 ASSESSMENT — EXTERNAL EXAM - RIGHT EYE: OD_EXAM: 2+ BROW PTOSIS, MILD TO MOD BROW

## 2022-11-15 NOTE — PATIENT INSTRUCTIONS
POST-OP CATARACT INSTRUCTIONS    *   Use the following drop(s) in the LEFT EYE four times a day:        CatarActive 3    Also continue to use CatarActive3 in the right eye four times a day until the bottle runs out, if it hasn't already    *   Wear eye shield over the left eye when sleeping for one week. Do not rub the operated eye.     *   No bending or lifting more than 10 pounds for one week.    *   Keep water out of eye for two weeks.    *   OK to resume aspirin and/or other blood thinners if you stopped.     *   If your vision worsens, eye becomes increasingly red, or becomes painful, call [phone].     Concha Oglesby M.D.

## 2022-11-15 NOTE — PROGRESS NOTES
Current Eye Medications:  CatarActive3 Four times daily left eye     Subjective:  Here for 1 day post op cataract surgery left eye (2nd eye). Vision is slightly foggy in left eye. No eye pain or discomfort.      Objective:  See Ophthalmology Exam.       Assessment:  Felipe Campbell is a 72 year old male who presents with:   Encounter Diagnosis   Name Primary?     Pseudophakia - Both Eyes Yes    POD1 s/p CE/IOL left eye and POW3 s/p CE/IOL right eye - moderate edema left eye.        Plan:  POST-OP CATARACT INSTRUCTIONS    *   Use the following drop(s) in the LEFT EYE four times a day:        CatarActive 3      Also continue to use CatarActive3 in the right eye four times a day until the bottle runs out, if it hasn't already    *   Wear eye shield over the left eye when sleeping for one week. Do not rub the operated eye.     *   No bending or lifting more than 10 pounds for one week.    *   Keep water out of eye for two weeks.    *   OK to resume aspirin and/or other blood thinners if you stopped.     *   If your vision worsens, eye becomes increasingly red, or becomes painful, call [phone].     Concha Oglesby M.D.

## 2022-11-15 NOTE — LETTER
11/15/2022         RE: Felipe Campbell  [address] [place] [address]        Dear Colleague,    Thank you for referring your patient, Felipe Campbell, to the [facility]. Please see a copy of my visit note below.     Current Eye Medications:  CatarActive3 Four times daily left eye     Subjective:  Here for 1 day post op cataract surgery left eye (2nd eye). Vision is slightly foggy in left eye. No eye pain or discomfort.      Objective:  See Ophthalmology Exam.       Assessment:  Felipe Campbell is a 72 year old male who presents with:   Encounter Diagnosis   Name Primary?     Pseudophakia - Both Eyes Yes    POD1 s/p CE/IOL left eye and POW3 s/p CE/IOL right eye - moderate edema left eye.        Plan:  POST-OP CATARACT INSTRUCTIONS    *   Use the following drop(s) in the LEFT EYE four times a day:        CatarActive 3      Also continue to use CatarActive3 in the right eye four times a day until the bottle runs out, if it hasn't already    *   Wear eye shield over the left eye when sleeping for one week. Do not rub the operated eye.     *   No bending or lifting more than 10 pounds for one week.    *   Keep water out of eye for two weeks.    *   OK to resume aspirin and/or other blood thinners if you stopped.     *   If your vision worsens, eye becomes increasingly red, or becomes painful, call [phone].     Concha Oglesby M.D.           Again, thank you for allowing me to participate in the care of your patient.        Sincerely,        Concha Oglesby MD

## 2022-12-07 ENCOUNTER — VIRTUAL VISIT (OUTPATIENT)
Dept: PHARMACY | Facility: CLINIC | Age: 72
End: 2022-12-07
Payer: COMMERCIAL

## 2022-12-07 DIAGNOSIS — E11.42 TYPE 2 DIABETES MELLITUS WITH DIABETIC POLYNEUROPATHY, WITH LONG-TERM CURRENT USE OF INSULIN (H): Primary | ICD-10-CM

## 2022-12-07 DIAGNOSIS — R80.9 MICROALBUMINURIA: ICD-10-CM

## 2022-12-07 DIAGNOSIS — N18.2 CKD (CHRONIC KIDNEY DISEASE) STAGE 2, GFR 60-89 ML/MIN: ICD-10-CM

## 2022-12-07 DIAGNOSIS — G47.00 INSOMNIA, UNSPECIFIED TYPE: ICD-10-CM

## 2022-12-07 DIAGNOSIS — J30.1 HAYFEVER: ICD-10-CM

## 2022-12-07 DIAGNOSIS — M17.0 OSTEOARTHRITIS OF BOTH KNEES, UNSPECIFIED OSTEOARTHRITIS TYPE: ICD-10-CM

## 2022-12-07 DIAGNOSIS — N52.9 ERECTILE DYSFUNCTION OF ORGANIC ORIGIN: ICD-10-CM

## 2022-12-07 DIAGNOSIS — Z79.4 TYPE 2 DIABETES MELLITUS WITH DIABETIC POLYNEUROPATHY, WITH LONG-TERM CURRENT USE OF INSULIN (H): Primary | ICD-10-CM

## 2022-12-07 DIAGNOSIS — E78.5 HYPERLIPIDEMIA LDL GOAL <100: ICD-10-CM

## 2022-12-07 DIAGNOSIS — H43.813 POSTERIOR VITREOUS DETACHMENT OF BOTH EYES: ICD-10-CM

## 2022-12-07 PROCEDURE — 99607 MTMS BY PHARM ADDL 15 MIN: CPT | Performed by: PHARMACIST

## 2022-12-07 PROCEDURE — 99606 MTMS BY PHARM EST 15 MIN: CPT | Performed by: PHARMACIST

## 2022-12-07 RX ORDER — CARBOXYMETHYLCELLULOSE SODIUM 5 MG/ML
1 SOLUTION/ DROPS OPHTHALMIC 3 TIMES DAILY PRN
COMMUNITY

## 2022-12-07 NOTE — PATIENT INSTRUCTIONS
"Recommendations from today's MTM visit:                                                         1. Ok to stop aspirin, minimal benefits have been found over age 70 with increased bleeding risk.    Follow-up: Return in about 6 months (around 6/7/2023) for Medication Therapy Management.    It was great speaking with you today.  I value your experience and would be very thankful for your time in providing feedback in our clinic survey. In the next few days, you may receive an email or text message from [employer] with a link to a survey related to your  clinical pharmacist.\"     To schedule another MTM appointment, please call the clinic directly or you may call the MTM scheduling line at [phone] or toll-free at [phone].     My Clinical Pharmacist's contact information:                                                      Please feel free to contact me with any questions or concerns you have.      Anna Su, PharmD  Medication Therapy Management Pharmacist  [phone]   "

## 2022-12-07 NOTE — PROGRESS NOTES
Medication Therapy Management (MTM) Encounter    ASSESSMENT:                            Medication Adherence/Access: No issues identified    Diabetes: Stable.  A1C at goal < 7%. Aspirin not needed due to age > 70 and primary prevention.    CKD-Stage 2: Stable. Blood pressure at goal < 140/90.    Hyperlipidemia: Stable. Pt is on moderate intensity statin which is indicated based on 2018 ACC/AHA guidelines for lipid management.      Insomnia: Stable.    ED/BPH:  Stable.     Bursitis, left shoulder: Stable.    Allergies:  Stable.     Dry eyes:  Stable.     Spot on Lung: Stable, per patient.     PLAN:                            1. Ok to stop aspirin, minimal benefits have been found over age 70 with increased bleeding risk.    Follow-up: Return in about 6 months (around 2023) for Medication Therapy Management.    SUBJECTIVE/OBJECTIVE:                          Felipe Campbell is a 72 year old male called for a follow-up visit.  Today's visit is a follow-up MTM visit from 22.     Reason for visit: med review.    Allergies/ADRs: Reviewed in chart  Past Medical History: Reviewed in chart  Tobacco: He reports that he quit smoking about 43 years ago. His smoking use included cigarettes. He started smoking about 50 years ago. He has a 3.50 pack-year smoking history. He has never used smokeless tobacco.  Alcohol: Less than 1 beverages / week  Caffeine: 2 cups/day of coffee  Activity: donya chi almost every morning    Medication Adherence/Access:  No concerns  The patient fills medications at [place]: NO, fills medications at [facility].    Diabetes:    metformin 1000mg twice daily  Lantus 6 units daily  Novolog 4 units three times daily with meals (generally 1-2 unit per carb choice plus one additional unit).     Pt is not experiencing side effects.    SMB-5x/day generally before meals, sometimes 2 hours post meal.   Ranges (per patient): 110-130, 2 hours after meal <180.   Symptoms of low blood sugar? none. Frequency  of hypoglycemia? None.  Has glucose tablets available if needed.  Recent symptoms of high blood sugar? none  Eye exam: up to date  Foot exam: up to date  Microalbumin is not < 30 mg/g. Pt is taking an ACEi/ARB.  Aspirin: Taking 81mg daily and denies side effects  Diet/Exercise:  Has increased activity a lot over the last year.  Lab Results   Component Value Date    UMALCR 38.27 (H) 02/16/2022     Lab Results   Component Value Date    A1C 6.6 08/22/2022    A1C 6.7 02/16/2022    A1C 6.4 07/06/2021    A1C 7.1 02/15/2021    A1C 6.3 07/29/2020    A1C 6.7 01/13/2020    A1C 6.4 07/09/2019     CKD-Stage 2:    lisinopril 2.5 mg daily  He denies side effects of therapy.    BP Readings from Last 3 Encounters:   11/14/22 120/70   10/24/22 122/68   10/19/22 121/71     GFR Estimate   Date Value Ref Range Status   08/22/2022 74 >60 mL/min/1.73m2 Final     Comment:     Effective December 21, 2021 eGFRcr in adults is calculated using the 2021 CKD-EPI creatinine equation which includes age and gender (Song et al., NEJM, DOI: 10.1056/GPSRqb6662793)   07/06/2021 82 >60 mL/min/[1.73_m2] Final     Comment:     Non  GFR Calc  Starting 12/18/2018, serum creatinine based estimated GFR (eGFR) will be   calculated using the Chronic Kidney Disease Epidemiology Collaboration   (CKD-EPI) equation.     02/15/2021 66 >60 mL/min/[1.73_m2] Final     Comment:     Non  GFR Calc  Starting 12/18/2018, serum creatinine based estimated GFR (eGFR) will be   calculated using the Chronic Kidney Disease Epidemiology Collaboration   (CKD-EPI) equation.     07/29/2020 70 >60 mL/min/[1.73_m2] Final     Comment:     Non  GFR Calc  Starting 12/18/2018, serum creatinine based estimated GFR (eGFR) will be   calculated using the Chronic Kidney Disease Epidemiology Collaboration   (CKD-EPI) equation.       Hyperlipidemia:   pravastatin 80mg once daily    Pt reports no significant myalgias or other side effects.   Recent  Labs   Lab Test 02/16/22  0742 02/15/21  0732 05/27/16  0801 06/15/15  0741   CHOL 177 161   < > 156   HDL 62 62   < > 58   LDL 88 71   < > 75   TRIG 136 139   < > 115   CHOLHDLRATIO  --   --   --  2.7    < > = values in this interval not displayed.     Insomnia:   Melatonin 1-2 mg at bedtime PRN (hasn't had to use recently)    Has trouble falling asleep on occassion, finds this effective. Denies side effects.     ED/BPH:    sildenafil 40-80mg as needed    Works well, denies side effects.   Follows with Dr. Steiner.  Had procedure on his prostate, no longer needs the tamsulosin.    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  Finds these effective.  He denies side effects.  He's aware of maximum APAP dosing/day. He has history of steroid injections, continues with PT exercises.     Allergies:    Zyrtec 10mg daily PRN seasonally    He finds effective.  He denies side effects.    Dry eyes:     Refresh tears as needed.    Recently had both eyes done for cataract surgery, went well. States this/these are effective. Denies side effects.     Spot on Lung:    CAT scan revealed a spot on his lungs in the past, repeat CAT scan showed no change, no concern for cancer at this time, it will be monitored yearly.    Today's Vitals: There were no vitals taken for this visit.  ----------------      I spent 24 minutes with this patient today. All changes were made via collaborative practice agreement with Curt Schneider MD. A copy of the visit note was provided to the patient's provider(s).    A summary of these recommendations was sent via [employer].    Anna Su, PharmD  Medication Therapy Management Pharmacist  [phone]    Telemedicine Visit Details  Type of service:  Telephone visit  Start Time: 7:40 AM  End Time: 8:04 AM  Originating Location (pt. Location): Home      Distant Location (provider location):   On-site  Provider has received verbal consent for a visit from the patient? Yes     Medication Therapy Recommendations  Type 2 diabetes mellitus with diabetic polyneuropathy, with long-term current use of insulin (H)    Current Medication: aspirin 81 MG tablet (Discontinued)   Rationale: Nonmedication therapy more appropriate - Unnecessary medication therapy - Indication   Recommendation: Discontinue Medication   Status: Accepted - no CPA Needed

## 2022-12-14 ENCOUNTER — OFFICE VISIT (OUTPATIENT)
Dept: OPHTHALMOLOGY | Facility: CLINIC | Age: 72
End: 2022-12-14
Payer: COMMERCIAL

## 2022-12-14 DIAGNOSIS — Z96.1 PSEUDOPHAKIA: Primary | ICD-10-CM

## 2022-12-14 DIAGNOSIS — H43.813 POSTERIOR VITREOUS DETACHMENT OF BOTH EYES: ICD-10-CM

## 2022-12-14 PROCEDURE — 99024 POSTOP FOLLOW-UP VISIT: CPT | Performed by: STUDENT IN AN ORGANIZED HEALTH CARE EDUCATION/TRAINING PROGRAM

## 2022-12-14 ASSESSMENT — REFRACTION_MANIFEST
OS_ADD: +2.25
OS_CYLINDER: +0.75
OD_AXIS: 177
OS_AXIS: 006
OD_ADD: +2.25
OD_CYLINDER: +0.75
OS_SPHERE: -0.50
OD_SPHERE: -0.75

## 2022-12-14 ASSESSMENT — TONOMETRY
OD_IOP_MMHG: 11
IOP_METHOD: APPLANATION
OS_IOP_MMHG: 11

## 2022-12-14 ASSESSMENT — CUP TO DISC RATIO
OD_RATIO: 0.3
OS_RATIO: 0.3

## 2022-12-14 ASSESSMENT — EXTERNAL EXAM - LEFT EYE: OS_EXAM: 2+ BROW PTOSIS, MILD TO MOD BROW

## 2022-12-14 ASSESSMENT — VISUAL ACUITY
METHOD: SNELLEN - LINEAR
OS_PH_SC: 20/25
OD_SC: 20/25
OS_SC: 20/40

## 2022-12-14 ASSESSMENT — EXTERNAL EXAM - RIGHT EYE: OD_EXAM: 2+ BROW PTOSIS, MILD TO MOD BROW

## 2022-12-14 ASSESSMENT — SLIT LAMP EXAM - LIDS
COMMENTS: NORMAL
COMMENTS: NORMAL

## 2022-12-14 NOTE — PROGRESS NOTES
Current Eye Medications:  ATs once a day both eyes.    Subjective:  Here for final post op cataract surgery both eyes  Right eye 10/24, left eye 11/14.  He notes his distance vision is great. He wears OTC readers only +1.25.  Floaters occasionally/ flutters of lights rarely.    KAMILLA eMrrill  1:04 PM 12/14/2022    Objective:  See Ophthalmology Exam.      Assessment:  Felipe Campbell is a 72 year old male who presents with:   Encounter Diagnoses   Name Primary?     Pseudophakia - Both Eyes Final MR both eyes - both healed well.     Posterior vitreous detachment of both eyes        Plan:  Continue artificial tears up to four times a day as needed     Continue over the counter readers or can fill glasses prescription given      Return to clinic in 1 year for a complete eye exam or earlier if problems should arise.    Concha Oglesby M.D.  [phone]

## 2022-12-14 NOTE — PATIENT INSTRUCTIONS
Continue artificial tears up to four times a day as needed     Continue over the counter readers or can fill glasses prescription given      Return to clinic in 1 year for a complete eye exam or earlier if problems should arise.    Concha Oglesby M.D.  [phone]

## 2022-12-14 NOTE — LETTER
12/14/2022         RE: Felipe Campbell  [address] [place] [address]        Dear Colleague,    Thank you for referring your patient, Felipe Campbell, to the [facility]. Please see a copy of my visit note below.    Current Eye Medications:  ATs once a day both eyes.    Subjective:  Here for final post op cataract surgery both eyes  Right eye 10/24, left eye 11/14.  He notes his distance vision is great. He wears OTC readers only +1.25.  Floaters occasionally/ flutters of lights rarely.    KAMILLA Merrill  1:04 PM 12/14/2022    Objective:  See Ophthalmology Exam.      Assessment:  Felipe Campbell is a 72 year old male who presents with:   Encounter Diagnoses   Name Primary?     Pseudophakia - Both Eyes Final MR both eyes - both healed well.     Posterior vitreous detachment of both eyes        Plan:  Continue artificial tears up to four times a day as needed     Continue over the counter readers or can fill glasses prescription given      Return to clinic in 1 year for a complete eye exam or earlier if problems should arise.    Concha Oglesby M.D.  [phone]         Again, thank you for allowing me to participate in the care of your patient.        Sincerely,        Concha Oglesby MD

## 2023-01-08 ENCOUNTER — HEALTH MAINTENANCE LETTER (OUTPATIENT)
Age: 73
End: 2023-01-08

## 2023-03-16 ENCOUNTER — LAB (OUTPATIENT)
Dept: LAB | Facility: CLINIC | Age: 73
End: 2023-03-16
Payer: COMMERCIAL

## 2023-03-16 DIAGNOSIS — E78.5 HYPERLIPIDEMIA LDL GOAL <100: ICD-10-CM

## 2023-03-16 DIAGNOSIS — N18.2 CKD (CHRONIC KIDNEY DISEASE) STAGE 2, GFR 60-89 ML/MIN: ICD-10-CM

## 2023-03-16 DIAGNOSIS — Z79.4 TYPE 2 DIABETES MELLITUS WITH DIABETIC POLYNEUROPATHY, WITH LONG-TERM CURRENT USE OF INSULIN (H): ICD-10-CM

## 2023-03-16 DIAGNOSIS — R80.9 MICROALBUMINURIA: ICD-10-CM

## 2023-03-16 DIAGNOSIS — E11.42 TYPE 2 DIABETES MELLITUS WITH DIABETIC POLYNEUROPATHY, WITH LONG-TERM CURRENT USE OF INSULIN (H): ICD-10-CM

## 2023-03-16 LAB
ANION GAP SERPL CALCULATED.3IONS-SCNC: 4 MMOL/L (ref 3–14)
BUN SERPL-MCNC: 20 MG/DL (ref 7–30)
CALCIUM SERPL-MCNC: 9.5 MG/DL (ref 8.5–10.1)
CHLORIDE BLD-SCNC: 108 MMOL/L (ref 94–109)
CHOLEST SERPL-MCNC: 163 MG/DL
CO2 SERPL-SCNC: 27 MMOL/L (ref 20–32)
CREAT SERPL-MCNC: 1.06 MG/DL (ref 0.66–1.25)
CREAT UR-MCNC: 109 MG/DL
FASTING STATUS PATIENT QL REPORTED: YES
GFR SERPL CREATININE-BSD FRML MDRD: 75 ML/MIN/1.73M2
GLUCOSE BLD-MCNC: 121 MG/DL (ref 70–99)
HBA1C MFR BLD: 6.9 % (ref 0–5.6)
HDLC SERPL-MCNC: 63 MG/DL
LDLC SERPL CALC-MCNC: 72 MG/DL
MICROALBUMIN UR-MCNC: 36 MG/L
MICROALBUMIN/CREAT UR: 33.03 MG/G CR (ref 0–17)
NONHDLC SERPL-MCNC: 100 MG/DL
POTASSIUM BLD-SCNC: 4.1 MMOL/L (ref 3.4–5.3)
SODIUM SERPL-SCNC: 139 MMOL/L (ref 133–144)
TRIGL SERPL-MCNC: 139 MG/DL

## 2023-03-16 PROCEDURE — 36415 COLL VENOUS BLD VENIPUNCTURE: CPT

## 2023-03-16 PROCEDURE — 80061 LIPID PANEL: CPT

## 2023-03-16 PROCEDURE — 83036 HEMOGLOBIN GLYCOSYLATED A1C: CPT

## 2023-03-16 PROCEDURE — 80048 BASIC METABOLIC PNL TOTAL CA: CPT

## 2023-03-16 PROCEDURE — 82043 UR ALBUMIN QUANTITATIVE: CPT

## 2023-03-16 PROCEDURE — 82570 ASSAY OF URINE CREATININE: CPT

## 2023-04-06 ENCOUNTER — OFFICE VISIT (OUTPATIENT)
Dept: INTERNAL MEDICINE | Facility: CLINIC | Age: 73
End: 2023-04-06
Payer: COMMERCIAL

## 2023-04-06 VITALS
SYSTOLIC BLOOD PRESSURE: 138 MMHG | HEIGHT: 76 IN | HEART RATE: 66 BPM | RESPIRATION RATE: 16 BRPM | DIASTOLIC BLOOD PRESSURE: 80 MMHG | WEIGHT: 206 LBS | OXYGEN SATURATION: 100 % | TEMPERATURE: 97.8 F | BODY MASS INDEX: 25.09 KG/M2

## 2023-04-06 DIAGNOSIS — E78.5 HYPERLIPIDEMIA LDL GOAL <100: ICD-10-CM

## 2023-04-06 DIAGNOSIS — R80.9 MICROALBUMINURIA: ICD-10-CM

## 2023-04-06 DIAGNOSIS — N18.2 CKD (CHRONIC KIDNEY DISEASE) STAGE 2, GFR 60-89 ML/MIN: ICD-10-CM

## 2023-04-06 DIAGNOSIS — Z79.4 TYPE 2 DIABETES MELLITUS WITH DIABETIC POLYNEUROPATHY, WITH LONG-TERM CURRENT USE OF INSULIN (H): Primary | ICD-10-CM

## 2023-04-06 DIAGNOSIS — E11.42 TYPE 2 DIABETES MELLITUS WITH DIABETIC POLYNEUROPATHY, WITH LONG-TERM CURRENT USE OF INSULIN (H): Primary | ICD-10-CM

## 2023-04-06 DIAGNOSIS — Z98.49 STATUS POST CATARACT EXTRACTION, UNSPECIFIED LATERALITY: ICD-10-CM

## 2023-04-06 PROCEDURE — 99214 OFFICE O/P EST MOD 30 MIN: CPT | Performed by: INTERNAL MEDICINE

## 2023-04-06 RX ORDER — INSULIN ASPART 100 [IU]/ML
INJECTION, SOLUTION INTRAVENOUS; SUBCUTANEOUS
Qty: 45 ML | Refills: 3 | Status: SHIPPED | OUTPATIENT
Start: 2023-04-06 | End: 2024-06-17

## 2023-04-06 RX ORDER — BLOOD SUGAR DIAGNOSTIC
STRIP MISCELLANEOUS
Qty: 400 STRIP | Refills: 3 | Status: SHIPPED | OUTPATIENT
Start: 2023-04-06 | End: 2024-06-17

## 2023-04-06 RX ORDER — INSULIN GLARGINE 100 [IU]/ML
INJECTION, SOLUTION SUBCUTANEOUS
Qty: 15 ML | Refills: 3 | Status: SHIPPED | OUTPATIENT
Start: 2023-04-06 | End: 2024-06-17

## 2023-04-06 RX ORDER — PRAVASTATIN SODIUM 80 MG/1
80 TABLET ORAL DAILY
Qty: 90 TABLET | Refills: 3 | Status: SHIPPED | OUTPATIENT
Start: 2023-04-06 | End: 2024-04-22

## 2023-04-06 RX ORDER — LISINOPRIL 2.5 MG/1
TABLET ORAL
Qty: 90 TABLET | Refills: 3 | Status: SHIPPED | OUTPATIENT
Start: 2023-04-06 | End: 2024-05-01

## 2023-04-06 RX ORDER — FLURBIPROFEN SODIUM 0.3 MG/ML
SOLUTION/ DROPS OPHTHALMIC
Qty: 400 EACH | Refills: 3 | Status: SHIPPED | OUTPATIENT
Start: 2023-04-06 | End: 2024-06-17

## 2023-04-06 NOTE — PROGRESS NOTES
Assessment & Plan     Type 2 diabetes mellitus with diabetic polyneuropathy, with long-term current use of insulin (H)  Today was a routine follow up office visit for this patient with well controlled diabetes mellitus type 2 . It's been around 6-7 years since he was insulinized and this has made all the difference for this patient. He hasn't had an hemoglobin a1c  [ diabetes test ] past around 7% for years and is at goal with all issues. Mild stable diabetes neuropathy . No other issues . We agreed today that we could just do his hemoglobin a1c  [ diabetes test ] in 6 months and if it's ok we can refrain from face to face encounter until one year. Prescriptions set for this time period  - blood glucose (ONETOUCH VERIO IQ) test strip; TEST FOUR TIMES DAILY AS DIRECTED  - insulin aspart (NOVOLOG FLEXPEN) 100 UNIT/ML pen; INJECT 4 UNITS UNDER THE SKIN THREE TIMES DAILY BEFORE MEAL PLUS CORRECTION FACTOR OF 1/80/80. MAX 25 UNITS PER DAY.  - insulin glargine (LANTUS SOLOSTAR) 100 UNIT/ML pen; INJECT 6 UNITS UNDER THE SKIN DAILY OR AS DIRECTED. PRIME WITH 2 UNITS EACH TIME(8 UNITS/DAY)  - insulin pen needle (B-D U/F) 31G X 5 MM miscellaneous; Use 4 pen needles daily or as directed.  - lisinopril (ZESTRIL) 2.5 MG tablet; TAKE 1 TABLETS BY MOUTH DAILY  - metFORMIN (GLUCOPHAGE) 500 MG tablet; Take 2 tablets (1,000 mg) by mouth 2 times daily (with meals)    CKD (chronic kidney disease) stage 2, GFR 60-89 ml/min  Problem is stable and ongoing monitoring      Hyperlipidemia LDL goal <100  Not due for laboratory studies on hypercholesterolemia   - pravastatin (PRAVACHOL) 80 MG tablet; Take 1 tablet (80 mg) by mouth daily    Microalbuminuria    - lisinopril (ZESTRIL) 2.5 MG tablet; TAKE 1 TABLETS BY MOUTH DAILY    Status post cataract extraction, bilateral  Noted as a point of historical importance , can see lots better since cataract extractions times 2 this last year       Review of the result(s) of each unique test - see  "preclinic labs  Prescription drug management  26 minutes spent by me on the date of the encounter doing chart review, history and exam, documentation and further activities per the note  {     BMI:   Estimated body mass index is 25.41 kg/m  as calculated from the following:    Height as of this encounter: 1.918 m (6' 3.5\").    Weight as of this encounter: 93.4 kg (206 lb).     Blood sugar testing frequency justification:  he does routine 2-3 times per day due to insulin      Curt Schneider MD  [facility] ANA ROSA Jaquez is a 72 year old, presenting for the following health issues:  Diabetes         View : No data to display.              History of Present Illness       Diabetes:   He presents for follow up of diabetes.  He is checking home blood glucose three times daily. He checks blood glucose before and after meals.  Blood glucose is never over 200 and never under 70. He is aware of hypoglycemia symptoms including shakiness and dizziness. He has no concerns regarding his diabetes at this time.  He is having numbness in feet.         He eats 2-3 servings of fruits and vegetables daily.He consumes 0 sweetened beverage(s) daily.He exercises with enough effort to increase his heart rate 10 to 19 minutes per day.  He exercises with enough effort to increase his heart rate 3 or less days per week.   He is taking medications regularly.     Patient does try hard to keep his disease in check      We reviewed his last chest CT scan and see no clear cut indication for additional scans he is a low risk patient and this is optional    Patient has some mild chronic diabetes neuropathy but this is not severe and not painful , he does do self foot examinations regularly     Review of Systems   Constitutional, HEENT, cardiovascular, pulmonary, gi and gu systems are negative, except as otherwise noted.      138/80 is his blood pressure     Objective    /80   Pulse 66   Temp 97.8  F (36.6  C) " "(Temporal)   Resp 16   Ht 1.918 m (6' 3.5\")   Wt 93.4 kg (206 lb)   SpO2 100%   BMI 25.41 kg/m    Body mass index is 25.41 kg/m .  Physical Exam   GENERAL: healthy, alert and no distress  EYES: Eyes grossly normal to inspection, PERRL and conjunctivae and sclerae normal  RESP: lungs clear to auscultation - no rales, rhonchi or wheezes  CV: regular rate and rhythm, normal S1 S2, no S3 or S4, no murmur, click or rub, no peripheral edema and peripheral pulses strong  MS: no gross musculoskeletal defects noted, no edema  SKIN: no suspicious lesions or rashes  NEURO: Normal strength and tone, mentation intact and speech normal  PSYCH: mentation appears normal, affect normal/bright    Recent Results (from the past 720 hour(s))   Albumin Random Urine Quantitative with Creat Ratio    Collection Time: 03/16/23  7:47 AM   Result Value Ref Range    Creatinine Urine mg/dL 109 mg/dL    Albumin Urine mg/L 36 mg/L    Albumin Urine mg/g Cr 33.03 (H) 0.00 - 17.00 mg/g Cr   Lipid panel reflex to direct LDL Fasting    Collection Time: 03/16/23  7:47 AM   Result Value Ref Range    Cholesterol 163 <200 mg/dL    Triglycerides 139 <150 mg/dL    Direct Measure HDL 63 >=40 mg/dL    LDL Cholesterol Calculated 72 <=100 mg/dL    Non HDL Cholesterol 100 <130 mg/dL    Patient Fasting > 8hrs? Yes    Hemoglobin A1c    Collection Time: 03/16/23  7:47 AM   Result Value Ref Range    Hemoglobin A1C 6.9 (H) 0.0 - 5.6 %   Basic metabolic panel  (Ca, Cl, CO2, Creat, Gluc, K, Na, BUN)    Collection Time: 03/16/23  7:47 AM   Result Value Ref Range    Sodium 139 133 - 144 mmol/L    Potassium 4.1 3.4 - 5.3 mmol/L    Chloride 108 94 - 109 mmol/L    Carbon Dioxide (CO2) 27 20 - 32 mmol/L    Anion Gap 4 3 - 14 mmol/L    Urea Nitrogen 20 7 - 30 mg/dL    Creatinine 1.06 0.66 - 1.25 mg/dL    Calcium 9.5 8.5 - 10.1 mg/dL    Glucose 121 (H) 70 - 99 mg/dL    GFR Estimate 75 >60 mL/min/1.73m2       Patient produced a copy of his home living will , this is scanned " into [facility] medical records .

## 2023-04-17 ENCOUNTER — DOCUMENTATION ONLY (OUTPATIENT)
Dept: OTHER | Facility: CLINIC | Age: 73
End: 2023-04-17
Payer: COMMERCIAL

## 2023-05-24 ENCOUNTER — IMMUNIZATION (OUTPATIENT)
Dept: FAMILY MEDICINE | Facility: CLINIC | Age: 73
End: 2023-05-24
Payer: COMMERCIAL

## 2023-05-24 DIAGNOSIS — Z23 HIGH PRIORITY FOR 2019-NCOV VACCINE: Primary | ICD-10-CM

## 2023-05-24 PROCEDURE — 99207 PR NO CHARGE LOS: CPT

## 2023-05-24 PROCEDURE — 91312 COVID-19 BIVALENT 12+ (PFIZER): CPT

## 2023-05-24 PROCEDURE — 0124A COVID-19 BIVALENT 12+ (PFIZER): CPT

## 2023-06-16 ENCOUNTER — VIRTUAL VISIT (OUTPATIENT)
Dept: PHARMACY | Facility: CLINIC | Age: 73
End: 2023-06-16
Payer: COMMERCIAL

## 2023-06-16 DIAGNOSIS — H43.813 POSTERIOR VITREOUS DETACHMENT OF BOTH EYES: ICD-10-CM

## 2023-06-16 DIAGNOSIS — M17.0 OSTEOARTHRITIS OF BOTH KNEES, UNSPECIFIED OSTEOARTHRITIS TYPE: ICD-10-CM

## 2023-06-16 DIAGNOSIS — Z79.4 TYPE 2 DIABETES MELLITUS WITH DIABETIC POLYNEUROPATHY, WITH LONG-TERM CURRENT USE OF INSULIN (H): Primary | ICD-10-CM

## 2023-06-16 DIAGNOSIS — G47.00 INSOMNIA, UNSPECIFIED TYPE: ICD-10-CM

## 2023-06-16 DIAGNOSIS — E78.5 HYPERLIPIDEMIA LDL GOAL <100: ICD-10-CM

## 2023-06-16 DIAGNOSIS — N52.9 ERECTILE DYSFUNCTION OF ORGANIC ORIGIN: ICD-10-CM

## 2023-06-16 DIAGNOSIS — R80.9 MICROALBUMINURIA: ICD-10-CM

## 2023-06-16 DIAGNOSIS — N18.2 CKD (CHRONIC KIDNEY DISEASE) STAGE 2, GFR 60-89 ML/MIN: ICD-10-CM

## 2023-06-16 DIAGNOSIS — E11.42 TYPE 2 DIABETES MELLITUS WITH DIABETIC POLYNEUROPATHY, WITH LONG-TERM CURRENT USE OF INSULIN (H): Primary | ICD-10-CM

## 2023-06-16 DIAGNOSIS — J30.1 HAYFEVER: ICD-10-CM

## 2023-06-16 PROCEDURE — 99605 MTMS BY PHARM NP 15 MIN: CPT | Mod: VID | Performed by: PHARMACIST

## 2023-06-16 NOTE — PROGRESS NOTES
Medication Therapy Management (MTM) Encounter    ASSESSMENT:                            Medication Adherence/Access: No issues identified    Diabetes: Stable.  A1C at goal < 7%. Aspirin not needed due to age > 70 and primary prevention.    CKD-Stage 2: Stable. Blood pressure at goal < 140/90.    Hyperlipidemia: Stable. Pt is on moderate intensity statin which is indicated based on 2018 ACC/AHA guidelines for lipid management.      Insomnia: Stable.    ED/BPH:  Stable.     Bursitis, left shoulder: Stable.    Allergies:  Stable.     Dry eyes:  Stable.     PLAN:                            1. Continue current medications.    Follow-up: Return in about 6 months (around 2023) for Medication Therapy Management.    SUBJECTIVE/OBJECTIVE:                          Leonid Campbell is a 73 year old male contacted via secure video for a follow-up visit.  Today's visit is a follow-up MTM visit from 22.     Reason for visit: med review.    Allergies/ADRs: Reviewed in chart  Past Medical History: Reviewed in chart  Tobacco: He reports that he quit smoking about 44 years ago. His smoking use included cigarettes. He started smoking about 50 years ago. He has a 3.50 pack-year smoking history. He has never used smokeless tobacco.  Alcohol: Less than 1 beverages / week  Caffeine: 2 cups/day of coffee  Activity: donya chi almost every morning    Medication Adherence/Access:  No concerns  The patient fills medications at [place]: NO, fills medications at [facility].    Diabetes:    metformin 1000mg twice daily  Lantus 6 units daily  Novolog 4 units three times daily with meals (generally 1-2 unit per carb choice plus one additional unit).     Pt is not experiencing side effects.    SMB-5x/day generally before meals, sometimes 2 hours post meal.   Ranges (per patient): , 2 hours after meal <180 on occasion 185, 186.   Symptoms of low blood sugar? none. Frequency of hypoglycemia? None.  Has glucose tablets available if  needed.  Recent symptoms of high blood sugar? none  Eye exam: up to date  Foot exam: up to date  Microalbumin is not < 30 mg/g. Pt is taking an ACEi/ARB.  Aspirin: none due to age and primary prevention  Diet/Exercise:  Has increased activity a lot over the last year.  Lab Results   Component Value Date    UMALCR 33.03 (H) 03/16/2023     Lab Results   Component Value Date    A1C 6.9 03/16/2023    A1C 6.6 08/22/2022    A1C 6.7 02/16/2022    A1C 6.4 07/06/2021    A1C 7.1 02/15/2021    A1C 6.3 07/29/2020    A1C 6.7 01/13/2020    A1C 6.4 07/09/2019     CKD-Stage 2:    lisinopril 2.5 mg daily    He denies side effects of therapy.  Does not self monitor blood pressure.  BP Readings from Last 3 Encounters:   04/06/23 138/80   11/14/22 120/70   10/24/22 122/68     GFR Estimate   Date Value Ref Range Status   03/16/2023 75 >60 mL/min/1.73m2 Final     Comment:     eGFR calculated using 2021 CKD-EPI equation.   08/22/2022 74 >60 mL/min/1.73m2 Final     Comment:     Effective December 21, 2021 eGFRcr in adults is calculated using the 2021 CKD-EPI creatinine equation which includes age and gender (Song alex al., NEJ, DOI: 10.1056/JYDQts0842612)   07/06/2021 82 >60 mL/min/[1.73_m2] Final     Comment:     Non  GFR Calc  Starting 12/18/2018, serum creatinine based estimated GFR (eGFR) will be   calculated using the Chronic Kidney Disease Epidemiology Collaboration   (CKD-EPI) equation.     02/15/2021 66 >60 mL/min/[1.73_m2] Final     Comment:     Non  GFR Calc  Starting 12/18/2018, serum creatinine based estimated GFR (eGFR) will be   calculated using the Chronic Kidney Disease Epidemiology Collaboration   (CKD-EPI) equation.     07/29/2020 70 >60 mL/min/[1.73_m2] Final     Comment:     Non  GFR Calc  Starting 12/18/2018, serum creatinine based estimated GFR (eGFR) will be   calculated using the Chronic Kidney Disease Epidemiology Collaboration   (CKD-EPI) equation.        Hyperlipidemia:   pravastatin 80mg once daily    Pt reports no significant myalgias or other side effects.   Recent Labs   Lab Test 03/16/23  0747 02/16/22  0742 05/27/16  0801 06/15/15  0741   CHOL 163 177   < > 156   HDL 63 62   < > 58   LDL 72 88   < > 75   TRIG 139 136   < > 115   CHOLHDLRATIO  --   --   --  2.7    < > = values in this interval not displayed.     Insomnia:   Melatonin 1-2 mg at bedtime PRN (hasn't had to use recently)    Has trouble falling asleep on occassion, finds this effective. Denies side effects.     ED/BPH:    sildenafil 40-80mg as needed    Works well, denies side effects.   Follows with Dr. Steiner.  Had procedure on his prostate, no longer needs the tamsulosin.    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  Finds these effective.  He denies side effects.  He's aware of maximum APAP dosing/day. He has history of steroid injections, continues with PT exercises.     Allergies:    Zyrtec 10mg daily PRN seasonally    He finds effective.  He denies side effects.    Dry eyes:     Refresh tears as needed.    Recently had both eyes done for cataract surgery, went well. States this/these are effective. Denies side effects.     Today's Vitals: There were no vitals taken for this visit.  ----------------      I spent 15 minutes with this patient today. All changes were made via collaborative practice agreement with Curt Schneider MD. A copy of the visit note was provided to the patient's provider(s).    A summary of these recommendations was sent via [employer].    Anna Su, Thad  Medication Therapy Management Pharmacist  [phone]      Telemedicine Visit Details  Type of service:  Video Conference via [employer]fox  Start Time: 12:04 pm  End Time: 12:19 PM     Medication Therapy Recommendations  No medication therapy recommendations to display

## 2023-06-16 NOTE — LETTER
_  Medication List        Prepared on: Jun 16, 2023     Bring your Medication List when you go to the doctor, hospital, or   emergency room. And, share it with your family or caregivers.     Note any changes to how you take your medications.  Cross out medications when you no longer use them.    Medication How I take it Why I use it Prescriber   acetaminophen (TYLENOL) 500 MG tablet Take 500-1,000 mg by mouth every 6 hours as needed for mild pain   Patient Reported   blood glucose (ONETOUCH VERIO IQ) test strip TEST FOUR TIMES DAILY AS DIRECTED Type 2 diabetes mellitus with diabetic polyneuropathy, with long-term current use of insulin (H) Curt Schneider MD   carboxymethylcellulose PF (REFRESH PLUS) 0.5 % ophthalmic solution 1 drop 3 times daily as needed for dry eyes  General Health   Patient Reported   cetirizine (ZYRTEC) 10 MG tablet Take 10 mg by mouth daily as needed for allergies General Health   Patient Reported   insulin aspart (NOVOLOG FLEXPEN) 100 UNIT/ML pen INJECT 4 UNITS UNDER THE SKIN THREE TIMES DAILY BEFORE MEAL PLUS CORRECTION FACTOR OF 1/80/80. MAX 25 UNITS PER DAY. Type 2 diabetes mellitus with diabetic polyneuropathy, with long-term current use of insulin (H) Curt Schneider MD   insulin glargine (LANTUS SOLOSTAR) 100 UNIT/ML pen INJECT 6 UNITS UNDER THE SKIN DAILY OR AS DIRECTED. PRIME WITH 2 UNITS EACH TIME(8 UNITS/DAY) Type 2 diabetes mellitus with diabetic polyneuropathy, with long-term current use of insulin (H) Curt Schneider MD   insulin pen needle (B-D U/F) 31G X 5 MM miscellaneous Use 4 pen needles daily or as directed. Type 2 diabetes mellitus with diabetic polyneuropathy, with long-term current use of insulin (H) Curt Schneider MD   lisinopril (ZESTRIL) 2.5 MG tablet TAKE 1 TABLETS BY MOUTH DAILY Type 2 diabetes mellitus with diabetic polyneuropathy, with long-term current use of insulin (H); Microalbuminuria Curt Schneider MD   melatonin 1 MG TABS Take 2 mg by mouth nightly as needed for  sleep  General Health   Patient reported   Menthol, Topical Analgesic, (BIOFREEZE EX) Apply topically as needed to shoulders/knees.  pain Patient Reported   metFORMIN (GLUCOPHAGE) 500 MG tablet Take 2 tablets (1,000 mg) by mouth 2 times daily (with meals) Type 2 diabetes mellitus with diabetic polyneuropathy, with long-term current use of insulin (H) Curt Schneider MD   pravastatin (PRAVACHOL) 80 MG tablet Take 1 tablet (80 mg) by mouth daily Hyperlipidemia LDL Goal <100 Curt Schneider MD   sildenafil (REVATIO) 20 MG tablet TAKE 2 TO 4 TABLETS BY MOUTH AT A TIME FOR PLANNED SEXUAL ACTIVITY. MAX 5 TABLETS PER DAY. Erectile Dysfunction of Organic Origin Curt Schneider MD         Add new medications, over-the-counter drugs, herbals, vitamins, or  minerals in the blank rows below.    Medication How I take it Why I use it Prescriber                                      Allergies:      lipitor [atorvastatin calcium]; hay fever & [a.r.m.]        Side effects I have had:               Other Information:              My notes and questions:

## 2023-06-16 NOTE — LETTER
"Recommended To-Do List      Prepared on: Jun 16, 2023       You can get the best results from your medications by completing the items on this \"To-Do List.\"      Bring your To-Do List when you go to your doctor. And, share it with your family or caregivers.    My To-Do List:  What we talked about: What I should do:    What my medicines are for, how to know if my medicines are working, made sure my medicines are safe for me and reviewed how to take my medicines.      Take my medicines every day                "

## 2023-06-16 NOTE — LETTER
June 16, 2023  Felipe RESTREPO Shannan  [address]  [place] [address]    Dear  SHERRIE Campbell [facility] ANA ROSA     Thank you for talking with me on Jun 16, 2023 about your health and medications. As a follow-up to our conversation, I have included two documents:      1. Your Recommended To-Do List has steps you should take to get the best results from your medications.  2. Your Medication List will help you keep track of your medications and how to take them.    If you want to talk about these documents, please call Moriah Su RPH at phone: [phone], Monday-Friday 8-4:30pm.    I look forward to working with you and your doctors to make sure your medications work well for you.    Sincerely,  Moriah Su RPH  [facility] Pharmacist, [facility]

## 2023-06-16 NOTE — PATIENT INSTRUCTIONS
"Recommendations from today's MTM visit:                                                         1. Continue current medications.      Follow-up: Return in about 6 months (around 12/16/2023) for Medication Therapy Management.    It was great speaking with you today.  I value your experience and would be very thankful for your time in providing feedback in our clinic survey. In the next few days, you may receive an email or text message from [employer] with a link to a survey related to your  clinical pharmacist.\"     To schedule another MTM appointment, please call the clinic directly or you may call the MTM scheduling line at [phone] or toll-free at [phone].     My Clinical Pharmacist's contact information:                                                      Please feel free to contact me with any questions or concerns you have.      Anna Su, PharmD  Medication Therapy Management Pharmacist  [phone]     "

## 2023-07-16 ENCOUNTER — HEALTH MAINTENANCE LETTER (OUTPATIENT)
Age: 73
End: 2023-07-16

## 2023-10-03 ENCOUNTER — MYC MEDICAL ADVICE (OUTPATIENT)
Dept: INTERNAL MEDICINE | Facility: CLINIC | Age: 73
End: 2023-10-03
Payer: COMMERCIAL

## 2023-10-03 DIAGNOSIS — E11.42 TYPE 2 DIABETES MELLITUS WITH DIABETIC POLYNEUROPATHY, WITH LONG-TERM CURRENT USE OF INSULIN (H): Primary | ICD-10-CM

## 2023-10-03 DIAGNOSIS — Z79.4 TYPE 2 DIABETES MELLITUS WITH DIABETIC POLYNEUROPATHY, WITH LONG-TERM CURRENT USE OF INSULIN (H): Primary | ICD-10-CM

## 2023-10-11 ENCOUNTER — LAB (OUTPATIENT)
Dept: LAB | Facility: CLINIC | Age: 73
End: 2023-10-11
Payer: COMMERCIAL

## 2023-10-11 DIAGNOSIS — E11.42 TYPE 2 DIABETES MELLITUS WITH DIABETIC POLYNEUROPATHY, WITH LONG-TERM CURRENT USE OF INSULIN (H): ICD-10-CM

## 2023-10-11 DIAGNOSIS — Z79.4 TYPE 2 DIABETES MELLITUS WITH DIABETIC POLYNEUROPATHY, WITH LONG-TERM CURRENT USE OF INSULIN (H): ICD-10-CM

## 2023-10-11 LAB
FASTING STATUS PATIENT QL REPORTED: YES
GLUCOSE SERPL-MCNC: 98 MG/DL (ref 70–99)
HBA1C MFR BLD: 6.6 % (ref 0–5.6)

## 2023-10-11 PROCEDURE — 36415 COLL VENOUS BLD VENIPUNCTURE: CPT

## 2023-10-11 PROCEDURE — 83036 HEMOGLOBIN GLYCOSYLATED A1C: CPT

## 2023-10-11 PROCEDURE — 82947 ASSAY GLUCOSE BLOOD QUANT: CPT

## 2023-10-12 ENCOUNTER — MYC MEDICAL ADVICE (OUTPATIENT)
Dept: INTERNAL MEDICINE | Facility: CLINIC | Age: 73
End: 2023-10-12
Payer: COMMERCIAL

## 2023-10-12 NOTE — TELEPHONE ENCOUNTER
Per 4/6/23 OV notes-  We agreed today that we could just do his hemoglobin a1c  [ diabetes test ] in 6 months and if it's ok we can refrain from face to face encounter until one year.     Jacek Hale RN  [facility]

## 2023-10-12 NOTE — TELEPHONE ENCOUNTER
Yes patient is correct. And his laboratory studies are excellent     Please help me as follows     If patient needs refills , line up these and reroute for signing  Remind him to schedule a follow up appointment in 6 months     After this you can close this encounter  , unless , Reroute if additional input requested from me     Curt Schneider MD

## 2024-01-05 ENCOUNTER — VIRTUAL VISIT (OUTPATIENT)
Dept: PHARMACY | Facility: CLINIC | Age: 74
End: 2024-01-05
Payer: COMMERCIAL

## 2024-01-05 DIAGNOSIS — N52.9 ERECTILE DYSFUNCTION OF ORGANIC ORIGIN: ICD-10-CM

## 2024-01-05 DIAGNOSIS — H43.813 POSTERIOR VITREOUS DETACHMENT OF BOTH EYES: ICD-10-CM

## 2024-01-05 DIAGNOSIS — J30.1 HAYFEVER: ICD-10-CM

## 2024-01-05 DIAGNOSIS — Z79.4 TYPE 2 DIABETES MELLITUS WITH DIABETIC POLYNEUROPATHY, WITH LONG-TERM CURRENT USE OF INSULIN (H): Primary | ICD-10-CM

## 2024-01-05 DIAGNOSIS — E11.42 TYPE 2 DIABETES MELLITUS WITH DIABETIC POLYNEUROPATHY, WITH LONG-TERM CURRENT USE OF INSULIN (H): Primary | ICD-10-CM

## 2024-01-05 DIAGNOSIS — N18.2 CKD (CHRONIC KIDNEY DISEASE) STAGE 2, GFR 60-89 ML/MIN: ICD-10-CM

## 2024-01-05 DIAGNOSIS — R80.9 MICROALBUMINURIA: ICD-10-CM

## 2024-01-05 DIAGNOSIS — E78.5 HYPERLIPIDEMIA LDL GOAL <100: ICD-10-CM

## 2024-01-05 DIAGNOSIS — G47.00 INSOMNIA, UNSPECIFIED TYPE: ICD-10-CM

## 2024-01-05 DIAGNOSIS — M17.0 OSTEOARTHRITIS OF BOTH KNEES, UNSPECIFIED OSTEOARTHRITIS TYPE: ICD-10-CM

## 2024-01-05 PROCEDURE — 99207 PR NO CHARGE LOS: CPT | Mod: 95 | Performed by: PHARMACIST

## 2024-01-05 NOTE — PATIENT INSTRUCTIONS
"Recommendations from today's MTM visit:                                                         1. Continue current medications.      Follow-up: Return in about 25 weeks (around 6/28/2024) for Medication Therapy Management.    It was great speaking with you today.  I value your experience and would be very thankful for your time in providing feedback in our clinic survey. In the next few days, you may receive an email or text message from [email or web address] with a link to a survey related to your  clinical pharmacist.\"     To schedule another MTM appointment, please call the clinic directly or you may call the MTM scheduling line at [phone] or toll-free at [phone].     My Clinical Pharmacist's contact information:                                                      Please feel free to contact me with any questions or concerns you have.      Anna Su, PharmD  Medication Therapy Management Pharmacist  [phone]     "

## 2024-01-05 NOTE — PROGRESS NOTES
Medication Therapy Management (MTM) Encounter    ASSESSMENT:                            Medication Adherence/Access: No issues identified    Diabetes/Type 2 Diabetes: Stable.  A1C at goal < 7%. Aspirin not needed due to age > 70 and primary prevention.    CKD-Stage 2: Stable. Blood pressure at goal < 140/90.    Hyperlipidemia: Stable. Pt is on moderate intensity statin which is indicated based on 2018 ACC/AHA guidelines for lipid management.      Insomnia: Stable.    ED/BPH:  Stable.     Bursitis, left shoulder: Stable.    Allergies:  Stable.     Dry eyes:  Stable.     PLAN:                            1. Continue current medications.    Follow-up: Return in about 25 weeks (around 2024) for Medication Therapy Management.    SUBJECTIVE/OBJECTIVE:                          Leonid Campbell is a 73 year old male contacted via secure video for a follow-up visit from 23.       Reason for visit: med review.    Allergies/ADRs: Reviewed in chart  Past Medical History: Reviewed in chart  Tobacco: He reports that he quit smoking about 45 years ago. His smoking use included cigarettes. He started smoking about 51 years ago. He has a 3.5 pack-year smoking history. He has never used smokeless tobacco.  Alcohol: Less than 1 beverages / week  Caffeine: 2 cups/day of coffee  Activity: donya chi almost every morning    Medication Adherence/Access:  No concerns  The patient fills medications at [facility]: NO, fills medications at [facility].    Diabetes /Type 2 Diabetes:  metformin 1000mg twice daily  Lantus 6 units daily  Novolog 4 units three times daily with meals (generally +1-2 unit per carb choice plus one additional unit)     Pt is not experiencing side effects.    SMB-5x/day generally before meals, sometimes 2 hours post meal.   Ranges (per patient): , 2 hours after meal <180 on occasion slightly higher  Symptoms of low blood sugar? none. Frequency of hypoglycemia? None.  Has glucose tablets available if needed.      Eye exam in the last 12 months? No  Foot exam: due  Urine Albumin:   Lab Results   Component Value Date    UMALCR 33.03 (H) 03/16/2023      Lab Results   Component Value Date    A1C 6.6 (H) 10/11/2023     Hypertension CKD Stage 2:   Lisinopril 2.5 mg daily     Patient reports no current medication side effects  Patient does not self-monitor blood pressure.       BP Readings from Last 3 Encounters:   04/06/23 138/80   11/14/22 120/70   10/24/22 122/68     Pulse Readings from Last 3 Encounters:   04/06/23 66   10/19/22 76   08/29/22 70     Hyperlipidemia   pravastatin 80mg daily    Patient reports no significant myalgias or other side effects.     Recent Labs   Lab Test 03/16/23  0747 02/16/22  0742   CHOL 163 177   HDL 63 62   LDL 72 88   TRIG 139 136     Insomnia:   Melatonin 1-2 mg at bedtime PRN (hasn't had to use recently)    Has trouble falling asleep on occassion, finds this effective. Denies side effects.     ED/BPH:    sildenafil 40-80mg as needed    Works well, denies side effects.   Follows with Dr. Steiner.  Had procedure on his prostate, no longer needs the tamsulosin.    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  Finds these effective.  He denies side effects.  He's aware of maximum APAP dosing/day. He has history of steroid injections, continues with PT exercises.     Allergies:    Zyrtec 10mg daily PRN seasonally    He finds effective.  He denies side effects.    Dry eyes:     Refresh tears as needed.    Recently had both eyes done for cataract surgery, went well. States this/these are effective. Denies side effects.     Today's Vitals: There were no vitals taken for this visit.  ----------------      I spent 18 minutes with this patient today. All changes were made via collaborative practice agreement with Curt Schneider MD. A copy of the visit note was provided to the patient's  provider(s).    A summary of these recommendations was sent via [email or web address].    Anna Su, RosalieD  Medication Therapy Management Pharmacist  [phone]      Telemedicine Visit Details  Type of service:  Telephone visit  Start Time:  10:02 AM  End Time: 10:20 AM     Medication Therapy Recommendations  No medication therapy recommendations to display

## 2024-01-05 NOTE — LETTER
_  Medication List        Prepared on: 01/05/2024     Bring your Medication List when you go to the doctor, hospital, or   emergency room. And, share it with your family or caregivers.     Note any changes to how you take your medications.  Cross out medications when you no longer use them.    Medication How I take it Why I use it Prescriber   acetaminophen (TYLENOL) 500 MG tablet Take 500-1,000 mg by mouth every 6 hours as needed for mild pain General health Patient Reported   blood glucose (ONETOUCH VERIO IQ) test strip TEST FOUR TIMES DAILY AS DIRECTED Type 2 diabetes mellitus with diabetic polyneuropathy, with long-term current use of insulin (H) Curt Schneider MD   carboxymethylcellulose PF (REFRESH PLUS) 0.5 % ophthalmic solution 1 drop 3 times daily as needed for dry eyes General health Patient Reported   cetirizine (ZYRTEC) 10 MG tablet Take 10 mg by mouth daily as needed for allergies General health Patient Reported   insulin aspart (NOVOLOG FLEXPEN) 100 UNIT/ML pen INJECT 4 UNITS UNDER THE SKIN THREE TIMES DAILY BEFORE MEAL PLUS CORRECTION FACTOR OF 1/80/80. MAX 25 UNITS PER DAY. Type 2 diabetes mellitus with diabetic polyneuropathy, with long-term current use of insulin (H) Curt Schneider MD   insulin glargine (LANTUS SOLOSTAR) 100 UNIT/ML pen INJECT 6 UNITS UNDER THE SKIN DAILY OR AS DIRECTED. PRIME WITH 2 UNITS EACH TIME(8 UNITS/DAY) Type 2 diabetes mellitus with diabetic polyneuropathy, with long-term current use of insulin (H) Curt Schneider MD   insulin pen needle (B-D U/F) 31G X 5 MM miscellaneous Use 4 pen needles daily or as directed. Type 2 diabetes mellitus with diabetic polyneuropathy, with long-term current use of insulin (H) Curt Schneider MD   lisinopril (ZESTRIL) 2.5 MG tablet TAKE 1 TABLETS BY MOUTH DAILY Type 2 diabetes mellitus with diabetic polyneuropathy, with long-term current use of insulin (H); Microalbuminuria Curt Schneider MD   melatonin 1 MG TABS Take 2 mg by mouth nightly as needed  for sleep General health Patient Reported   Menthol, Topical Analgesic, (BIOFREEZE EX) Apply topically as needed to shoulders/knees. General health Patient Reported   metFORMIN (GLUCOPHAGE) 500 MG tablet Take 2 tablets (1,000 mg) by mouth 2 times daily (with meals) Type 2 diabetes mellitus with diabetic polyneuropathy, with long-term current use of insulin (H) Curt Schneider MD   pravastatin (PRAVACHOL) 80 MG tablet Take 1 tablet (80 mg) by mouth daily Hyperlipidemia LDL Goal <100 Curt Schneider MD   sildenafil (REVATIO) 20 MG tablet TAKE 2 TO 4 TABLETS BY MOUTH AT A TIME FOR PLANNED SEXUAL ACTIVITY. MAX 5 TABLETS PER DAY. Erectile Dysfunction of Organic Origin Curt Schneider MD         Add new medications, over-the-counter drugs, herbals, vitamins, or  minerals in the blank rows below.    Medication How I take it Why I use it Prescriber                                      Allergies:      lipitor [atorvastatin calcium]; hay fever & [a.r.m.]        Side effects I have had:               Other Information:              My notes and questions:

## 2024-01-05 NOTE — LETTER
January 5, 2024  Felipe RESTREPO Shannan  [address]  [place] [address]    [facility]     Thank you for talking with me on Jan 5, 2024 about your health and medications. As a follow-up to our conversation, I have included two documents:      Your Recommended To-Do List has steps you should take to get the best results from your medications.  Your Medication List will help you keep track of your medications and how to take them.    If you want to talk about these documents, please call Moriah Su RPH at phone: [phone], Monday-Friday 8-4:30pm.    I look forward to working with you and your doctors to make sure your medications work well for you.    Sincerely,  Moriah Su RPH  [facility] Pharmacist, [facility]

## 2024-01-05 NOTE — LETTER
"Recommended To-Do List      Prepared on: 01/05/2024       You can get the best results from your medications by completing the items on this \"To-Do List.\"      Bring your To-Do List when you go to your doctor. And, share it with your family or caregivers.    My To-Do List:  What we talked about: What I should do:    What my medicines are for, how to know if my medicines are working, made sure my medicines are safe for me and reviewed how to take my medicines.    Take my medicines every day                 "

## 2024-01-09 ENCOUNTER — OFFICE VISIT (OUTPATIENT)
Dept: OPHTHALMOLOGY | Facility: CLINIC | Age: 74
End: 2024-01-09
Payer: COMMERCIAL

## 2024-01-09 DIAGNOSIS — H43.813 POSTERIOR VITREOUS DETACHMENT OF BOTH EYES: ICD-10-CM

## 2024-01-09 DIAGNOSIS — E11.42 TYPE 2 DIABETES MELLITUS WITH DIABETIC POLYNEUROPATHY, WITH LONG-TERM CURRENT USE OF INSULIN (H): ICD-10-CM

## 2024-01-09 DIAGNOSIS — H52.4 MYOPIA OF BOTH EYES WITH REGULAR ASTIGMATISM AND PRESBYOPIA: ICD-10-CM

## 2024-01-09 DIAGNOSIS — Z79.4 TYPE 2 DIABETES MELLITUS WITH DIABETIC POLYNEUROPATHY, WITH LONG-TERM CURRENT USE OF INSULIN (H): ICD-10-CM

## 2024-01-09 DIAGNOSIS — Z96.1 PSEUDOPHAKIA: ICD-10-CM

## 2024-01-09 DIAGNOSIS — H52.13 MYOPIA OF BOTH EYES WITH REGULAR ASTIGMATISM AND PRESBYOPIA: ICD-10-CM

## 2024-01-09 DIAGNOSIS — H52.223 MYOPIA OF BOTH EYES WITH REGULAR ASTIGMATISM AND PRESBYOPIA: ICD-10-CM

## 2024-01-09 DIAGNOSIS — Z01.01 ENCOUNTER FOR EXAMINATION OF EYES AND VISION WITH ABNORMAL FINDINGS: Primary | ICD-10-CM

## 2024-01-09 PROCEDURE — 92015 DETERMINE REFRACTIVE STATE: CPT | Performed by: STUDENT IN AN ORGANIZED HEALTH CARE EDUCATION/TRAINING PROGRAM

## 2024-01-09 PROCEDURE — 92014 COMPRE OPH EXAM EST PT 1/>: CPT | Performed by: STUDENT IN AN ORGANIZED HEALTH CARE EDUCATION/TRAINING PROGRAM

## 2024-01-09 ASSESSMENT — REFRACTION_MANIFEST
OD_AXIS: 170
OD_ADD: +2.75
OS_SPHERE: -0.75
OS_AXIS: 007
OD_SPHERE: -0.75
OS_ADD: +2.75
OD_CYLINDER: +0.75
OS_CYLINDER: +1.25

## 2024-01-09 ASSESSMENT — CUP TO DISC RATIO
OD_RATIO: 0.3
OS_RATIO: 0.3

## 2024-01-09 ASSESSMENT — VISUAL ACUITY
METHOD: SNELLEN - LINEAR
OS_PH_SC+: -1
OD_PH_SC: 20/20
OS_SC+: -1
OD_SC: 20/40
OS_PH_SC: 20/20
OD_PH_SC+: -2
OS_SC: 20/40

## 2024-01-09 ASSESSMENT — REFRACTION_WEARINGRX
OS_SPHERE: +1.25
OS_CYLINDER: SPHERE
OD_SPHERE: +1.25
OD_CYLINDER: SPHERE
SPECS_TYPE: OTC READERS

## 2024-01-09 ASSESSMENT — CONF VISUAL FIELD
OS_INFERIOR_TEMPORAL_RESTRICTION: 0
OD_SUPERIOR_TEMPORAL_RESTRICTION: 0
OS_SUPERIOR_NASAL_RESTRICTION: 0
OD_SUPERIOR_NASAL_RESTRICTION: 0
OD_INFERIOR_TEMPORAL_RESTRICTION: 0
OS_INFERIOR_NASAL_RESTRICTION: 0
OD_INFERIOR_NASAL_RESTRICTION: 0
OD_NORMAL: 1
OS_NORMAL: 1
OS_SUPERIOR_TEMPORAL_RESTRICTION: 0

## 2024-01-09 ASSESSMENT — SLIT LAMP EXAM - LIDS
COMMENTS: NORMAL
COMMENTS: NORMAL

## 2024-01-09 ASSESSMENT — TONOMETRY
OD_IOP_MMHG: 14
OS_IOP_MMHG: 14
IOP_METHOD: APPLANATION

## 2024-01-09 ASSESSMENT — EXTERNAL EXAM - LEFT EYE: OS_EXAM: 2+ BROW PTOSIS, MILD TO MOD BROW

## 2024-01-09 ASSESSMENT — EXTERNAL EXAM - RIGHT EYE: OD_EXAM: 2+ BROW PTOSIS, MILD TO MOD BROW

## 2024-01-09 NOTE — PATIENT INSTRUCTIONS
"Continue artificial tears up to four times a day as needed (Refresh Optive, Systane Balance, or TheraTears. Avoid generic artificial tears or \"get the red out\" drops).      Glasses prescription given - optional to update    Keep blood sugars and blood pressure under good control.    Concha Oglesby MD  [phone]    Patient Education   Diabetes weakens the blood vessels all over the body, including the eyes. Damage to the blood vessels in the eyes can cause swelling or bleeding into part of the eye (called the retina). This is called diabetic retinopathy (KAMERON-tin--pu-thee). If not treated, this disease can cause vision loss or blindness.   Symptoms may include blurred or distorted vision, but many people have no symptoms. It's important to see your eye doctor regularly to check for problems.   Early treatment and good control can help protect your vision. Here are the things you can do to help prevent vision loss:      1. Keep your blood sugar levels under tight control.      2. Bring high blood pressure under control.      3. No smoking.      4. Have yearly dilated eye exams.      "

## 2024-01-09 NOTE — PROGRESS NOTES
" Current Eye Medications: Refresh qam both eyes      Subjective:  here for complete eye exam. Has been doing very well with the vision s/p cataract surgery in both eyes.   Hemoglobin A1C   Date Value Ref Range Status   10/11/2023 6.6 (H) 0.0 - 5.6 % Final     Comment:     Normal <5.7%   Prediabetes 5.7-6.4%    Diabetes 6.5% or higher     Note: Adopted from ADA consensus guidelines.   07/06/2021 6.4 (H) 0 - 5.6 % Final     Comment:     Normal <5.7% Prediabetes 5.7-6.4%  Diabetes 6.5% or higher - adopted from ADA   consensus guidelines.        Objective:  See Ophthalmology Exam.       Assessment:  Felipe Campbell is a 73 year old male who presents with:   Encounter Diagnoses   Name Primary?    Encounter for examination of eyes and vision with abnormal findings Yes    Type 2 diabetes mellitus with diabetic polyneuropathy, with long-term current use of insulin (H) Negative diabetic retinopathy     Pseudophakia - Both Eyes     Posterior vitreous detachment of both eyes     Myopia of both eyes with regular astigmatism and presbyopia        Plan:  Continue artificial tears up to four times a day as needed (Refresh Optive, Systane Balance, or TheraTears. Avoid generic artificial tears or \"get the red out\" drops).      Glasses prescription given - optional to update    Keep blood sugars and blood pressure under good control.    Concha Oglesby MD  [phone]    Patient Education  Diabetes weakens the blood vessels all over the body, including the eyes. Damage to the blood vessels in the eyes can cause swelling or bleeding into part of the eye (called the retina). This is called diabetic retinopathy (KAMERON-tin--puh-thee). If not treated, this disease can cause vision loss or blindness.   Symptoms may include blurred or distorted vision, but many people have no symptoms. It's important to see your eye doctor regularly to check for problems.   Early treatment and good control can help protect your vision. Here are the " things you can do to help prevent vision loss:      1. Keep your blood sugar levels under tight control.      2. Bring high blood pressure under control.      3. No smoking.      4. Have yearly dilated eye exams.

## 2024-01-09 NOTE — LETTER
"    1/9/2024         RE: Felipe Campbell  [address]  [place] [address]        Dear Colleague,    Thank you for referring your patient, Felipe Campbell, to the [facility]. Please see a copy of my visit note below.     Current Eye Medications: Refresh qam both eyes      Subjective:  here for complete eye exam. Has been doing very well with the vision s/p cataract surgery in both eyes.   Hemoglobin A1C   Date Value Ref Range Status   10/11/2023 6.6 (H) 0.0 - 5.6 % Final     Comment:     Normal <5.7%   Prediabetes 5.7-6.4%    Diabetes 6.5% or higher     Note: Adopted from ADA consensus guidelines.   07/06/2021 6.4 (H) 0 - 5.6 % Final     Comment:     Normal <5.7% Prediabetes 5.7-6.4%  Diabetes 6.5% or higher - adopted from ADA   consensus guidelines.        Objective:  See Ophthalmology Exam.       Assessment:  Felipe Campbell is a 73 year old male who presents with:   Encounter Diagnoses   Name Primary?     Encounter for examination of eyes and vision with abnormal findings Yes     Type 2 diabetes mellitus with diabetic polyneuropathy, with long-term current use of insulin (H) Negative diabetic retinopathy      Pseudophakia - Both Eyes      Posterior vitreous detachment of both eyes      Myopia of both eyes with regular astigmatism and presbyopia        Plan:  Continue artificial tears up to four times a day as needed (Refresh Optive, Systane Balance, or TheraTears. Avoid generic artificial tears or \"get the red out\" drops).      Glasses prescription given - optional to update    Keep blood sugars and blood pressure under good control.    Concha Oglesby MD  [phone]    Patient Education  Diabetes weakens the blood vessels all over the body, including the eyes. Damage to the blood vessels in the eyes can cause swelling or bleeding into part of the eye (called the retina). This is called diabetic retinopathy (KAMERON-tin--puh-thee). If not treated, this disease can cause " vision loss or blindness.   Symptoms may include blurred or distorted vision, but many people have no symptoms. It's important to see your eye doctor regularly to check for problems.   Early treatment and good control can help protect your vision. Here are the things you can do to help prevent vision loss:      1. Keep your blood sugar levels under tight control.      2. Bring high blood pressure under control.      3. No smoking.      4. Have yearly dilated eye exams.          Again, thank you for allowing me to participate in the care of your patient.        Sincerely,        Concha Oglesby MD

## 2024-02-07 NOTE — MR AVS SNAPSHOT
After Visit Summary   3/22/2018    Felipe Campbell    MRN: [id number]           Patient Information     Date Of Birth          1950        Visit Information        Provider Department      3/22/2018 3:30 PM Emmanuelle Henry, [facility] MTM        Today's Diagnoses     Type 2 diabetes mellitus with diabetic polyneuropathy, with long-term current use of insulin (H)    -  1      Care Instructions    Recommendations from today's MTM visit:                                                    MTM (medication therapy management) is a service provided by a clinical pharmacist designed to help you get the most of out of your medicines.   Today we reviewed what your medicines are for, how to know if they are working, that your medicines are safe and how to make your medicine regimen as easy as possible.     1.  I provided you with a One Touch Verio Flex glucometer.  The test strips/lancets should be covered under both [employer] and [employer].  Let me know when you want me to send the prescriptions for test strips and lancets.    2.  We can change Lantus to Basaglar for your next refill, and then change back to Lantus once you're on [employer].  We wouldn't expect any differences in dosing/blood sugars with this change.  Let me know when you need a prescription.    Next MTM visit:  6 months; but let me know sooner what prescriptions you need.  Feel free to call or myChart    To schedule another MTM appointment, please call the clinic directly or you may call the MTM scheduling line at [phone] or toll-free at [phone].     My Clinical Pharmacist's contact information:                                                      It was a pleasure seeing you today!  Please feel free to contact me with any questions or concerns you have.      Emmanuelle Henry, PharmD, BCACP  Medication Therapy Management Provider  Pager: [phone]     You may receive a survey about the MTM services you received.  I  would appreciate your feedback to help me serve you better in the future. Please fill it out and return it when you can. Your comments will be anonymous.                     Follow-ups after your visit        Who to contact     If you have questions or need follow up information about today's clinic visit or your schedule please contact [facility] MT directly at [phone].  Normal or non-critical lab and imaging results will be communicated to you by MyChart, letter or phone within 4 business days after the clinic has received the results. If you do not hear from us within 7 days, please contact the clinic through [employer] or phone. If you have a critical or abnormal lab result, we will notify you by phone as soon as possible.  Submit refill requests through [employer] or call your pharmacy and they will forward the refill request to us. Please allow 3 business days for your refill to be completed.          Additional Information About Your Visit        "[employer]"hart Information     [employer] gives you secure access to your electronic health record. If you see a primary care provider, you can also send messages to your care team and make appointments. If you have questions, please call your primary care clinic.  If you do not have a primary care provider, please call [phone] and they will assist you.        Care EveryWhere ID     This is your Care EveryWhere ID. This could be used by other organizations to access your [place] medical records  [id number]         Blood Pressure from Last 3 Encounters:   03/22/18 116/66   12/21/17 128/64   08/31/17 120/72    Weight from Last 3 Encounters:   12/21/17 200 lb 6.4 oz (90.9 kg)   09/21/17 205 lb (93 kg)   06/13/17 209 lb (94.8 kg)              Today, you had the following     No orders found for display         Today's Medication Changes          These changes are accurate as of 3/22/18  4:07 PM.  If you have any questions, ask your nurse or doctor.                Start taking these medicines.        Dose/Directions    ONETOUCH VERIO FLEX SYSTEM W/DEVICE Kit   Used for:  Type 2 diabetes mellitus with diabetic polyneuropathy, with long-term current use of insulin (H)        Dose:  1 Device   1 Device once for 1 dose   Quantity:  1 kit   Refills:  0         Stop taking these medicines if you haven't already. Please contact your care team if you have questions.     VIAGRA 100 MG tablet   Generic drug:  sildenafil                Where to get your medicines      Some of these will need a paper prescription and others can be bought over the counter.  Ask your nurse if you have questions.     You don't need a prescription for these medications     ONETOUCH VERIO FLEX SYSTEM W/DEVICE Kit                Primary Care Provider Office Phone # Fax #    Curt Schneider [phone] [facility] [address]        Equal Access to Services     [facility] JOANIE : Hadii che waltono Sosav, waaxda luqadaha, qaybta kaalmada adenimisha, nelly arellano . So [facility] [phone].    ATENCIÓN: Si habla español, tiene a girard disposición servicios gratuitos de asistencia lingüística. [facility] [phone].    We comply with applicable federal civil rights laws and [place] laws. We do not discriminate on the basis of race, color, national origin, age, disability, sex, sexual orientation, or gender identity.            Thank you!     Thank you for choosing [facility]  for your care. Our goal is always to provide you with excellent care. Hearing back from our patients is one way we can continue to improve our services. Please take a few minutes to complete the written survey that you may receive in the mail after your visit with us. Thank you!             Your Updated Medication List - Protect others around you: Learn how to safely use, store and throw away your medicines at www.disposemymeds.org.          This list is accurate  as of 3/22/18  4:07 PM.  Always use your most recent med list.                   Brand Name Dispense Instructions for use Diagnosis    acetaminophen 500 MG tablet    TYLENOL     Take 500-1,000 mg by mouth every 6 hours as needed for mild pain        aspirin 81 MG tablet      Take 1 tablet by mouth daily.    Type 2 diabetes, HbA1c goal < 7% (H)       cetirizine 10 MG tablet    zyrTEC     Take 10 mg by mouth daily as needed for allergies        GLUCAGON EMERGENCY 1 MG kit   Generic drug:  glucagon     1 mg    Inject 1 mg Subcutaneous once for 1 dose    Type 2 diabetes, controlled, with peripheral neuropathy (H)       insulin aspart 100 UNIT/ML injection    NovoLOG FLEXPEN    30 mL    4 units before breakfast, 4 units before lunch, 4 units before dinner. (Pre meal take 1 unit per carb choice +1 unit of Novolog.)  Plus Correction factor of 1/80/180 pre meal. Pre meal blood glucose 180-260 add 1 unit, blood glucose 261-340 add 2 units, blood glucose 341-420 add 3 units.  maximum units are 25 units per day in total    Type 2 diabetes mellitus with diabetic polyneuropathy, with long-term current use of insulin (H)       insulin pen needle 31G X 6 MM     100 each    Use 4 daily or as directed.    Type 2 diabetes, controlled, with peripheral neuropathy (H)       LANTUS SOLOSTAR 100 UNIT/ML injection   Generic drug:  insulin glargine     15 mL    INJECT 10 UNITS UNDER THE SKIN AT BEDTIME    Type 2 diabetes, controlled, with peripheral neuropathy (H)       lisinopril 5 MG tablet    PRINIVIL/ZESTRIL    90 tablet    TAKE 1 TABLET(5 MG) BY MOUTH DAILY    Type 2 diabetes, controlled, with peripheral neuropathy (H), Microalbuminuria       melatonin 1 MG Tabs tablet      Take 2 mg by mouth nightly as needed for sleep        metFORMIN 500 MG tablet    GLUCOPHAGE    360 tablet    TAKE 2 TABLETS BY MOUTH TWICE DAILY WITH MEALS    Type 2 diabetes mellitus with diabetic neuropathy, without long-term current use of insulin (H)        Blue Medora FLEX SYSTEM W/DEVICE Kit     1 kit    1 Device once for 1 dose    Type 2 diabetes mellitus with diabetic polyneuropathy, with long-term current use of insulin (H)       pravastatin 80 MG tablet    PRAVACHOL    90 tablet    Take 1 tablet (80 mg) by mouth daily    Hyperlipidemia LDL goal <100       tadalafil 20 MG tablet    CIALIS    6 tablet    Take 1 tablet (20 mg) by mouth daily as needed prior to sex. Do not use with nitroglycerin, terazosin or doxazosin.    Erectile dysfunction of organic origin          hide

## 2024-02-11 ENCOUNTER — HEALTH MAINTENANCE LETTER (OUTPATIENT)
Age: 74
End: 2024-02-11

## 2024-04-19 DIAGNOSIS — E78.5 HYPERLIPIDEMIA LDL GOAL <100: ICD-10-CM

## 2024-04-19 DIAGNOSIS — E11.42 TYPE 2 DIABETES MELLITUS WITH DIABETIC POLYNEUROPATHY, WITH LONG-TERM CURRENT USE OF INSULIN (H): ICD-10-CM

## 2024-04-19 DIAGNOSIS — Z79.4 TYPE 2 DIABETES MELLITUS WITH DIABETIC POLYNEUROPATHY, WITH LONG-TERM CURRENT USE OF INSULIN (H): ICD-10-CM

## 2024-04-22 RX ORDER — PRAVASTATIN SODIUM 80 MG/1
80 TABLET ORAL DAILY
Qty: 90 TABLET | Refills: 0 | Status: SHIPPED | OUTPATIENT
Start: 2024-04-22 | End: 2024-06-17

## 2024-04-28 ENCOUNTER — MYC MEDICAL ADVICE (OUTPATIENT)
Dept: INTERNAL MEDICINE | Facility: CLINIC | Age: 74
End: 2024-04-28
Payer: COMMERCIAL

## 2024-04-28 DIAGNOSIS — R80.9 MICROALBUMINURIA: ICD-10-CM

## 2024-04-28 DIAGNOSIS — Z79.4 TYPE 2 DIABETES MELLITUS WITH DIABETIC POLYNEUROPATHY, WITH LONG-TERM CURRENT USE OF INSULIN (H): ICD-10-CM

## 2024-04-28 DIAGNOSIS — E11.42 TYPE 2 DIABETES MELLITUS WITH DIABETIC POLYNEUROPATHY, WITH LONG-TERM CURRENT USE OF INSULIN (H): ICD-10-CM

## 2024-04-28 DIAGNOSIS — E78.5 HYPERLIPIDEMIA LDL GOAL <100: Primary | ICD-10-CM

## 2024-05-01 DIAGNOSIS — R80.9 MICROALBUMINURIA: ICD-10-CM

## 2024-05-01 DIAGNOSIS — Z79.4 TYPE 2 DIABETES MELLITUS WITH DIABETIC POLYNEUROPATHY, WITH LONG-TERM CURRENT USE OF INSULIN (H): ICD-10-CM

## 2024-05-01 DIAGNOSIS — E11.42 TYPE 2 DIABETES MELLITUS WITH DIABETIC POLYNEUROPATHY, WITH LONG-TERM CURRENT USE OF INSULIN (H): ICD-10-CM

## 2024-05-01 RX ORDER — LISINOPRIL 2.5 MG/1
TABLET ORAL
Qty: 90 TABLET | Refills: 1 | Status: SHIPPED | OUTPATIENT
Start: 2024-05-01 | End: 2024-06-17

## 2024-05-09 ENCOUNTER — LAB (OUTPATIENT)
Dept: LAB | Facility: CLINIC | Age: 74
End: 2024-05-09
Payer: COMMERCIAL

## 2024-05-09 DIAGNOSIS — Z79.4 TYPE 2 DIABETES MELLITUS WITH DIABETIC POLYNEUROPATHY, WITH LONG-TERM CURRENT USE OF INSULIN (H): ICD-10-CM

## 2024-05-09 DIAGNOSIS — E78.5 HYPERLIPIDEMIA LDL GOAL <100: ICD-10-CM

## 2024-05-09 DIAGNOSIS — R80.9 MICROALBUMINURIA: ICD-10-CM

## 2024-05-09 DIAGNOSIS — E11.42 TYPE 2 DIABETES MELLITUS WITH DIABETIC POLYNEUROPATHY, WITH LONG-TERM CURRENT USE OF INSULIN (H): ICD-10-CM

## 2024-05-09 LAB
ANION GAP SERPL CALCULATED.3IONS-SCNC: 10 MMOL/L (ref 7–15)
BUN SERPL-MCNC: 15.3 MG/DL (ref 8–23)
CALCIUM SERPL-MCNC: 9.6 MG/DL (ref 8.8–10.2)
CHLORIDE SERPL-SCNC: 105 MMOL/L (ref 98–107)
CHOLEST SERPL-MCNC: 147 MG/DL
CREAT SERPL-MCNC: 1.07 MG/DL (ref 0.67–1.17)
CREAT UR-MCNC: 81 MG/DL
DEPRECATED HCO3 PLAS-SCNC: 24 MMOL/L (ref 22–29)
EGFRCR SERPLBLD CKD-EPI 2021: 74 ML/MIN/1.73M2
FASTING STATUS PATIENT QL REPORTED: YES
FASTING STATUS PATIENT QL REPORTED: YES
GLUCOSE SERPL-MCNC: 94 MG/DL (ref 70–99)
HBA1C MFR BLD: 7 % (ref 0–5.6)
HDLC SERPL-MCNC: 57 MG/DL
LDLC SERPL CALC-MCNC: 70 MG/DL
MICROALBUMIN UR-MCNC: 17.1 MG/L
MICROALBUMIN/CREAT UR: 21.11 MG/G CR (ref 0–17)
NONHDLC SERPL-MCNC: 90 MG/DL
POTASSIUM SERPL-SCNC: 4.2 MMOL/L (ref 3.4–5.3)
SODIUM SERPL-SCNC: 139 MMOL/L (ref 135–145)
TRIGL SERPL-MCNC: 102 MG/DL

## 2024-05-09 PROCEDURE — 80061 LIPID PANEL: CPT

## 2024-05-09 PROCEDURE — 83036 HEMOGLOBIN GLYCOSYLATED A1C: CPT

## 2024-05-09 PROCEDURE — 82043 UR ALBUMIN QUANTITATIVE: CPT

## 2024-05-09 PROCEDURE — 82570 ASSAY OF URINE CREATININE: CPT

## 2024-05-09 PROCEDURE — 80048 BASIC METABOLIC PNL TOTAL CA: CPT

## 2024-05-09 PROCEDURE — 36415 COLL VENOUS BLD VENIPUNCTURE: CPT

## 2024-05-20 ENCOUNTER — TELEPHONE (OUTPATIENT)
Dept: OPHTHALMOLOGY | Facility: CLINIC | Age: 74
End: 2024-05-20
Payer: COMMERCIAL

## 2024-05-20 NOTE — TELEPHONE ENCOUNTER
"[facility]    Phone Message    May a detailed message be left on voicemail: yes     Reason for Call: Other: Patient sent an in-basket appointment request: Stating \"Dr. Oglesby told me at my January, 2024, appointment that if the cloudiness in my right eye due to PCO was getting bothersome that I could schedule the procedure to remove the tissue that is causing the problem. \"   Please call patient.  Thank you.      Action Taken: Message routed to:  [facility] ([facility] Ophthalmology    Travel Screening: Not Applicable                                                                   "

## 2024-06-04 ENCOUNTER — TRANSFERRED RECORDS (OUTPATIENT)
Dept: MULTI SPECIALTY CLINIC | Facility: CLINIC | Age: 74
End: 2024-06-04

## 2024-06-04 LAB — RETINOPATHY: NORMAL

## 2024-06-17 ENCOUNTER — OFFICE VISIT (OUTPATIENT)
Dept: INTERNAL MEDICINE | Facility: CLINIC | Age: 74
End: 2024-06-17
Payer: COMMERCIAL

## 2024-06-17 VITALS
DIASTOLIC BLOOD PRESSURE: 74 MMHG | TEMPERATURE: 97.2 F | WEIGHT: 205.2 LBS | BODY MASS INDEX: 24.99 KG/M2 | HEIGHT: 76 IN | HEART RATE: 69 BPM | RESPIRATION RATE: 14 BRPM | SYSTOLIC BLOOD PRESSURE: 128 MMHG | OXYGEN SATURATION: 99 %

## 2024-06-17 DIAGNOSIS — E78.5 HYPERLIPIDEMIA LDL GOAL <100: ICD-10-CM

## 2024-06-17 DIAGNOSIS — Z29.11 NEED FOR VACCINATION AGAINST RESPIRATORY SYNCYTIAL VIRUS: ICD-10-CM

## 2024-06-17 DIAGNOSIS — E11.42 TYPE 2 DIABETES MELLITUS WITH DIABETIC POLYNEUROPATHY, WITH LONG-TERM CURRENT USE OF INSULIN (H): Primary | ICD-10-CM

## 2024-06-17 DIAGNOSIS — R80.9 MICROALBUMINURIA: ICD-10-CM

## 2024-06-17 DIAGNOSIS — N18.2 CKD (CHRONIC KIDNEY DISEASE) STAGE 2, GFR 60-89 ML/MIN: ICD-10-CM

## 2024-06-17 DIAGNOSIS — Z79.4 TYPE 2 DIABETES MELLITUS WITH DIABETIC POLYNEUROPATHY, WITH LONG-TERM CURRENT USE OF INSULIN (H): Primary | ICD-10-CM

## 2024-06-17 DIAGNOSIS — L57.0 SENILE KERATOSIS: ICD-10-CM

## 2024-06-17 PROCEDURE — 99214 OFFICE O/P EST MOD 30 MIN: CPT | Performed by: INTERNAL MEDICINE

## 2024-06-17 PROCEDURE — 99207 PR FOOT EXAM NO CHARGE: CPT | Performed by: INTERNAL MEDICINE

## 2024-06-17 RX ORDER — RESPIRATORY SYNCYTIAL VIRUS VACCINE 120MCG/0.5
0.5 KIT INTRAMUSCULAR ONCE
Qty: 1 EACH | Refills: 0 | Status: SHIPPED | OUTPATIENT
Start: 2024-06-17 | End: 2024-06-17

## 2024-06-17 RX ORDER — BLOOD SUGAR DIAGNOSTIC
STRIP MISCELLANEOUS
Qty: 400 STRIP | Refills: 3 | Status: SHIPPED | OUTPATIENT
Start: 2024-06-17

## 2024-06-17 RX ORDER — LISINOPRIL 2.5 MG/1
TABLET ORAL
Qty: 90 TABLET | Refills: 3 | Status: SHIPPED | OUTPATIENT
Start: 2024-06-17

## 2024-06-17 RX ORDER — INSULIN GLARGINE 100 [IU]/ML
INJECTION, SOLUTION SUBCUTANEOUS
Qty: 15 ML | Refills: 3 | Status: SHIPPED | OUTPATIENT
Start: 2024-06-17

## 2024-06-17 RX ORDER — INSULIN ASPART 100 [IU]/ML
INJECTION, SOLUTION INTRAVENOUS; SUBCUTANEOUS
Qty: 45 ML | Refills: 3 | Status: SHIPPED | OUTPATIENT
Start: 2024-06-17

## 2024-06-17 RX ORDER — FLURBIPROFEN SODIUM 0.3 MG/ML
SOLUTION/ DROPS OPHTHALMIC
Qty: 400 EACH | Refills: 3 | Status: SHIPPED | OUTPATIENT
Start: 2024-06-17

## 2024-06-17 RX ORDER — PRAVASTATIN SODIUM 80 MG/1
80 TABLET ORAL DAILY
Qty: 90 TABLET | Refills: 3 | Status: SHIPPED | OUTPATIENT
Start: 2024-06-17

## 2024-06-17 NOTE — PROGRESS NOTES
Assessment & Plan     Type 2 diabetes mellitus with diabetic polyneuropathy, with long-term current use of insulin (H)  An absolutely delightful elderly gentleman I have followed for diabetes mellitus type 2  evaluation and management for quite a long time. It's a pleasant job as this patient has maintained a wonderful control of his disease. He never did have obesity and is one of the patients who has more of a genetic link to diabetes mellitus via his father who did much more poorly then this patient. We have followed Leonid regularly at 6 month time interval for follow up , last year for the first time we simply had him get the hemoglobin a1c  [ diabetes test ] measurement at the 6 month chuyita and now it has been one full year since our last apartment. This program worked out just fine. And can be repeated thus I ordered already the hemoglobin a1c  [ diabetes test ] to be measured in 6 months time and further follow up face to face encounter appointment in one year is the plan for now. He's current with all diabetes mellitus benchmarks . We reviewed the [employer] home monitor for blood glucose monitoring which he would qualify for based on his insulin dependent diabetes mellitus type 2  condition but he is not interested for now  - REVIEW OF HEALTH MAINTENANCE PROTOCOL ORDERS  - blood glucose (ONETOUCH VERIO IQ) test strip; TEST FOUR TIMES DAILY AS DIRECTED  - insulin aspart (NOVOLOG FLEXPEN) 100 UNIT/ML pen; INJECT 4 UNITS UNDER THE SKIN THREE TIMES DAILY BEFORE MEAL PLUS CORRECTION FACTOR OF 1/80/80. MAX 25 UNITS PER DAY.  - insulin glargine (LANTUS SOLOSTAR) 100 UNIT/ML pen; INJECT 6 UNITS UNDER THE SKIN DAILY OR AS DIRECTED. PRIME WITH 2 UNITS EACH TIME(8 UNITS/DAY)  - insulin pen needle (B-D U/F) 31G X 5 MM miscellaneous; Use 4 pen needles daily or as directed.  - lisinopril (ZESTRIL) 2.5 MG tablet; TAKE ONE TABLET BY MOUTH ONCE DAILY  - metFORMIN (GLUCOPHAGE) 500 MG tablet; Take 2 tablets (1,000 mg) by mouth 2  "times daily (with meals)  - FOOT EXAM  - Hemoglobin A1c; Future    Need for vaccination against respiratory syncytial virus  To be administered   - REVIEW OF HEALTH MAINTENANCE PROTOCOL ORDERS  - respiratory syncytial virus vaccine, bivalent (ABRYSVO) injection; Inject 0.5 mLs into the muscle once for 1 dose    HYPERLIPIDEMIA LDL GOAL <100  Problem is stable and ongoing monitoring    - REVIEW OF HEALTH MAINTENANCE PROTOCOL ORDERS  - pravastatin (PRAVACHOL) 80 MG tablet; Take 1 tablet (80 mg) by mouth daily    CKD (chronic kidney disease) stage 2, GFR 60-89 ml/min  Based on his gfr [ glomerular filtration rate ] between 60-90 with a slight persistent bump upwards with his Albumin random urine quantitative     Microalbuminuria  As detailed above   - lisinopril (ZESTRIL) 2.5 MG tablet; TAKE ONE TABLET BY MOUTH ONCE DAILY    Seborrheic keratosis   He showed me two seborrheic keratosis lesions on his back. Both are right around hyperthyroidism level of L1/T10-11 area and are harmless, one is very tan almost just skin colored and the lesion on the right side is a bit darker , typical color of seborrheic keratosis. We discussed what to be on the watch for  , if he feels things are changing or he has real concerns he's instructed to contact me via [employer] message or call and we can place orders for a dermatologist consultation     BMI  Estimated body mass index is 25.31 kg/m  as calculated from the following:    Height as of this encounter: 1.918 m (6' 3.5\").    Weight as of this encounter: 93.1 kg (205 lb 3.2 oz).     Blood sugar testing frequency justification:  blood glucose variability       Twin Jaquez is a 74 year old, presenting for the following health issues:  Diabetes (Neuropathy /) and Derm Problem (Skin tags on back )        6/17/2024     8:30 AM   Additional Questions   Roomed by evaristo     History of Present Illness       Diabetes:   He presents for follow up of diabetes.  He is checking home blood " glucose four or more times daily.   He checks blood glucose before meals and after meals.  Blood glucose is never over 200 and never under 70. He is aware of hypoglycemia symptoms including shakiness.    He has no concerns regarding his diabetes at this time.  He is having numbness in feet.            He eats 2-3 servings of fruits and vegetables daily.He consumes 0 sweetened beverage(s) daily.He exercises with enough effort to increase his heart rate 30 to 60 minutes per day.  He exercises with enough effort to increase his heart rate 4 days per week.   He is taking medications regularly.     He does home blood glucose monitoring 3-4 times per day and has . Carbohydrate counting and and ranges with rapid acting insulin 48-64 units per meal. Patient expresses a lack of interest in the idea of a Pat home monitor for blood glucose monitoring.      GFR Estimate   Date Value Ref Range Status   05/09/2024 74 >60 mL/min/1.73m2 Final   03/16/2023 75 >60 mL/min/1.73m2 Final     Comment:     eGFR calculated using 2021 CKD-EPI equation.   08/22/2022 74 >60 mL/min/1.73m2 Final     Comment:     Effective December 21, 2021 eGFRcr in adults is calculated using the 2021 CKD-EPI creatinine equation which includes age and gender (Song et al., NEJM, DOI: 10.1056/SZMFbl6627833)   07/06/2021 82 >60 mL/min/[1.73_m2] Final     Comment:     Non  GFR Calc  Starting 12/18/2018, serum creatinine based estimated GFR (eGFR) will be   calculated using the Chronic Kidney Disease Epidemiology Collaboration   (CKD-EPI) equation.     02/15/2021 66 >60 mL/min/[1.73_m2] Final     Comment:     Non  GFR Calc  Starting 12/18/2018, serum creatinine based estimated GFR (eGFR) will be   calculated using the Chronic Kidney Disease Epidemiology Collaboration   (CKD-EPI) equation.     07/29/2020 70 >60 mL/min/[1.73_m2] Final     Comment:     Non  GFR Calc  Starting 12/18/2018, serum creatinine based estimated  "GFR (eGFR) will be   calculated using the Chronic Kidney Disease Epidemiology Collaboration   (CKD-EPI) equation.     2020 76 >60 mL/min/[1.73_m2] Final     Comment:     Non  GFR Calc  Starting 2018, serum creatinine based estimated GFR (eGFR) will be   calculated using the Chronic Kidney Disease Epidemiology Collaboration   (CKD-EPI) equation.     2019 69 >60 mL/min/[1.73_m2] Final     Comment:     Non  GFR Calc  Starting 2018, serum creatinine based estimated GFR (eGFR) will be   calculated using the Chronic Kidney Disease Epidemiology Collaboration   (CKD-EPI) equation.         He maintains regular physical exam   Wt Readings from Last 5 Encounters:   24 93.1 kg (205 lb 3.2 oz)   23 93.4 kg (206 lb)   10/19/22 93.9 kg (207 lb)   22 93.4 kg (206 lb)   22 93.7 kg (206 lb 9.6 oz)     Body mass index is 25.31 kg/m .    Father  of complications of diabetes mellitus type 2  at 75 but the patient is absolutely so much in better shape.    Patients last eye exam, he had cataract extraction  and has further follow up ongoing with Dr. Concha Oglesby, ophthalmologist with [facility] foot examinations are emphasized , patient does have some mild diabetes neuropathy , it is a tingling and numbess type sensation without pain.     Lantus (Insulin Glargine) has to be change to a different insulin - Lantus (Insulin Glargine) -yfgn         Review of Systems  Constitutional, HEENT, cardiovascular, pulmonary, gi and gu systems are negative, except as otherwise noted.      Objective    /74   Pulse 69   Temp 97.2  F (36.2  C) (Temporal)   Resp 14   Ht 1.918 m (6' 3.5\")   Wt 93.1 kg (205 lb 3.2 oz)   SpO2 99%   BMI 25.31 kg/m    Body mass index is 25.31 kg/m .  Physical Exam   GENERAL: alert and no distress  NECK: no adenopathy, no asymmetry, masses, or scars  RESP: lungs clear to auscultation - no rales, " rhonchi or wheezes  CV: regular rate and rhythm, normal S1 S2, no S3 or S4, no murmur, click or rub, no peripheral edema  ABDOMEN: soft, nontender, no hepatosplenomegaly, no masses and bowel sounds normal  MS: no gross musculoskeletal defects noted, no edema            Signed Electronically by: Curt Schneider MD

## 2024-06-27 ENCOUNTER — OFFICE VISIT (OUTPATIENT)
Dept: OPHTHALMOLOGY | Facility: CLINIC | Age: 74
End: 2024-06-27
Payer: COMMERCIAL

## 2024-06-27 DIAGNOSIS — Z96.1 PSEUDOPHAKIA: ICD-10-CM

## 2024-06-27 DIAGNOSIS — H26.491 RIGHT POSTERIOR CAPSULAR OPACIFICATION: Primary | ICD-10-CM

## 2024-06-27 PROCEDURE — 66821 AFTER CATARACT LASER SURGERY: CPT | Mod: RT | Performed by: STUDENT IN AN ORGANIZED HEALTH CARE EDUCATION/TRAINING PROGRAM

## 2024-06-27 ASSESSMENT — VISUAL ACUITY
OS_SC: 20/40
OD_SC+: -2
OS_PH_SC: 20/20
OS_SC+: -1
OS_PH_SC+: -3
OD_SC: 20/50
METHOD: SNELLEN - LINEAR

## 2024-06-27 ASSESSMENT — TONOMETRY
OS_IOP_MMHG: 17
IOP_METHOD: APPLANATION
OD_IOP_MMHG: 15
IOP_METHOD: APPLANATION
OD_IOP_MMHG: 11

## 2024-06-27 ASSESSMENT — SLIT LAMP EXAM - LIDS
COMMENTS: NORMAL
COMMENTS: NORMAL

## 2024-06-27 ASSESSMENT — EXTERNAL EXAM - LEFT EYE: OS_EXAM: 2+ BROW PTOSIS, MILD TO MOD BROW

## 2024-06-27 ASSESSMENT — EXTERNAL EXAM - RIGHT EYE: OD_EXAM: 2+ BROW PTOSIS, MILD TO MOD BROW

## 2024-06-27 NOTE — PATIENT INSTRUCTIONS
"You may notice more floaters for the next few days.  Return in about 4 weeks for Intraocular pressure check and refraction.    Continue artificial tears up to four times a day as needed (Refresh Optive, Systane Balance, or TheraTears. Avoid generic artificial tears or \"get the red out\" drops).     Concha Oglesby MD  [phone]    "

## 2024-06-27 NOTE — PROGRESS NOTES
" Current Eye Medications:  Refresh every morning both eyes      Subjective:  Yag laser right eye 6/27/24. Vision is fuzzy and difficult to read right eye. Vision is doing fine left eye. Actually having to look out of left eye when using camera, because right eye is blurry. No eye pain or discomfort in either eye.      Objective:  See Ophthalmology Exam.       Assessment:  Felipe Campbell is a 74 year old male who presents with:   Encounter Diagnoses   Name Primary?    Right posterior capsular opacification YAG capsulotomy  right eye performed today without complication.      Pseudophakia - Both Eyes        Plan:  You may notice more floaters for the next few days.  Return in about 4 weeks for Intraocular pressure check and refraction.    Continue artificial tears up to four times a day as needed (Refresh Optive, Systane Balance, or TheraTears. Avoid generic artificial tears or \"get the red out\" drops).     Concha Oglesby MD  [phone]      "

## 2024-06-27 NOTE — LETTER
"6/27/2024      Felipe Campbell  [address] [place] [address]      Dear Colleague,    Thank you for referring your patient, Felipe Campbell, to the [facility]. Please see a copy of my visit note below.     Current Eye Medications:  Refresh every morning both eyes      Subjective:  Yag laser right eye 6/27/24. Vision is fuzzy and difficult to read right eye. Vision is doing fine left eye. Actually having to look out of left eye when using camera, because right eye is blurry. No eye pain or discomfort in either eye.      Objective:  See Ophthalmology Exam.       Assessment:  Felipe Campbell is a 74 year old male who presents with:   Encounter Diagnoses   Name Primary?     Right posterior capsular opacification YAG capsulotomy  right eye performed today without complication.       Pseudophakia - Both Eyes        Plan:  You may notice more floaters for the next few days.  Return in about 4 weeks for Intraocular pressure check and refraction.    Continue artificial tears up to four times a day as needed (Refresh Optive, Systane Balance, or TheraTears. Avoid generic artificial tears or \"get the red out\" drops).     Concha Oglesby MD  [phone]      Again, thank you for allowing me to participate in the care of your patient.        Sincerely,        Concha Oglesby MD  "

## 2024-07-30 ENCOUNTER — OFFICE VISIT (OUTPATIENT)
Dept: OPHTHALMOLOGY | Facility: CLINIC | Age: 74
End: 2024-07-30
Payer: COMMERCIAL

## 2024-07-30 DIAGNOSIS — Z96.1 PSEUDOPHAKIA: Primary | ICD-10-CM

## 2024-07-30 PROCEDURE — 99024 POSTOP FOLLOW-UP VISIT: CPT | Performed by: STUDENT IN AN ORGANIZED HEALTH CARE EDUCATION/TRAINING PROGRAM

## 2024-07-30 ASSESSMENT — REFRACTION_MANIFEST
OS_SPHERE: -0.75
OD_AXIS: 170
OD_ADD: +2.75
OS_ADD: +2.75
OD_SPHERE: -0.25
OS_CYLINDER: +1.25
OS_AXIS: 013
OD_CYLINDER: +1.00

## 2024-07-30 ASSESSMENT — SLIT LAMP EXAM - LIDS
COMMENTS: NORMAL
COMMENTS: NORMAL

## 2024-07-30 ASSESSMENT — VISUAL ACUITY
OD_SC: 20/25
OS_SC: 20/30
METHOD: SNELLEN - LINEAR
OS_SC+: -2
OD_SC+: -1

## 2024-07-30 ASSESSMENT — EXTERNAL EXAM - LEFT EYE: OS_EXAM: 2+ BROW PTOSIS, MILD TO MOD BROW

## 2024-07-30 ASSESSMENT — TONOMETRY
IOP_METHOD: APPLANATION
OD_IOP_MMHG: 16

## 2024-07-30 ASSESSMENT — EXTERNAL EXAM - RIGHT EYE: OD_EXAM: 2+ BROW PTOSIS, MILD TO MOD BROW

## 2024-07-30 NOTE — LETTER
7/30/2024      Felipe Campbell  [address] [place] [address]      Dear Colleague,    Thank you for referring your patient, Felipe Campbell, to the [facility]. Please see a copy of my visit note below.     Current Eye Medications:  refresh - both eyes QAM     Subjective:  post-op YAG Cap right eye 6/27/2024 - vision improved since procedure.      Objective:  See Ophthalmology Exam.       Assessment:  Felipe Campbell is a 74 year old male who presents with:   Encounter Diagnosis   Name Primary?     Pseudophakia - Both Eyes s/p YAG cap OD Doing well s/p YAG cap right eye        Plan:  Continue Refresh drops every morning in both eyes     Glasses prescription given - optional to use    Concha Oglesby MD  [phone]       Again, thank you for allowing me to participate in the care of your patient.        Sincerely,        Concha Oglesby MD   no concerns

## 2024-07-30 NOTE — PATIENT INSTRUCTIONS
Continue Refresh drops every morning in both eyes     Glasses prescription given - optional to use    Concha Oglesby MD  [phone]

## 2024-07-30 NOTE — PROGRESS NOTES
Current Eye Medications:  refresh - both eyes QAM     Subjective:  post-op YAG Cap right eye 6/27/2024 - vision improved since procedure.      Objective:  See Ophthalmology Exam.       Assessment:  Felipe Campbell is a 74 year old male who presents with:   Encounter Diagnosis   Name Primary?    Pseudophakia - Both Eyes s/p YAG cap OD Doing well s/p YAG cap right eye        Plan:  Continue Refresh drops every morning in both eyes     Glasses prescription given - optional to use    Concha Oglesby MD  [phone]

## 2024-08-14 ENCOUNTER — VIRTUAL VISIT (OUTPATIENT)
Dept: PHARMACY | Facility: CLINIC | Age: 74
End: 2024-08-14
Payer: COMMERCIAL

## 2024-08-14 DIAGNOSIS — Z79.4 TYPE 2 DIABETES MELLITUS WITH DIABETIC POLYNEUROPATHY, WITH LONG-TERM CURRENT USE OF INSULIN (H): Primary | ICD-10-CM

## 2024-08-14 DIAGNOSIS — R80.9 MICROALBUMINURIA: ICD-10-CM

## 2024-08-14 DIAGNOSIS — E78.5 HYPERLIPIDEMIA LDL GOAL <100: ICD-10-CM

## 2024-08-14 DIAGNOSIS — J30.1 HAYFEVER: ICD-10-CM

## 2024-08-14 DIAGNOSIS — M17.0 OSTEOARTHRITIS OF BOTH KNEES, UNSPECIFIED OSTEOARTHRITIS TYPE: ICD-10-CM

## 2024-08-14 DIAGNOSIS — N18.2 CKD (CHRONIC KIDNEY DISEASE) STAGE 2, GFR 60-89 ML/MIN: ICD-10-CM

## 2024-08-14 DIAGNOSIS — E11.42 TYPE 2 DIABETES MELLITUS WITH DIABETIC POLYNEUROPATHY, WITH LONG-TERM CURRENT USE OF INSULIN (H): Primary | ICD-10-CM

## 2024-08-14 DIAGNOSIS — Z98.49 STATUS POST CATARACT EXTRACTION, UNSPECIFIED LATERALITY: ICD-10-CM

## 2024-08-14 PROCEDURE — 99605 MTMS BY PHARM NP 15 MIN: CPT | Mod: 93 | Performed by: PHARMACIST

## 2024-08-14 NOTE — LETTER
_  Medication List        Prepared on: Aug 14, 2024     Bring your Medication List when you go to the doctor, hospital, or   emergency room. And, share it with your family or caregivers.     Note any changes to how you take your medications.  Cross out medications when you no longer use them.    Medication How I take it Why I use it Prescriber   acetaminophen (TYLENOL) 500 MG tablet Take 500-1,000 mg by mouth every 6 hours as needed for mild pain  pain Patient Reported   blood glucose (ONETOUCH VERIO IQ) test strip TEST FOUR TIMES DAILY AS DIRECTED Type 2 diabetes mellitus with diabetic polyneuropathy, with long-term current use of insulin (H) Curt Schneider MD   carboxymethylcellulose PF (REFRESH PLUS) 0.5 % ophthalmic solution 1 drop 3 times daily as needed for dry eyes  General Health   Patient Reported   cetirizine (ZYRTEC) 10 MG tablet Take 10 mg by mouth daily as needed for allergies  allergies Patient Reported   insulin aspart (NOVOLOG FLEXPEN) 100 UNIT/ML pen INJECT 4 UNITS UNDER THE SKIN THREE TIMES DAILY BEFORE MEAL PLUS CORRECTION FACTOR OF 1/80/80. MAX 25 UNITS PER DAY. Type 2 diabetes mellitus with diabetic polyneuropathy, with long-term current use of insulin (H) Curt Schneider MD   insulin glargine (LANTUS SOLOSTAR) 100 UNIT/ML pen INJECT 6 UNITS UNDER THE SKIN DAILY OR AS DIRECTED. PRIME WITH 2 UNITS EACH TIME(8 UNITS/DAY) Type 2 diabetes mellitus with diabetic polyneuropathy, with long-term current use of insulin (H) Curt Schneider MD   insulin pen needle (B-D U/F) 31G X 5 MM miscellaneous Use 4 pen needles daily or as directed. Type 2 diabetes mellitus with diabetic polyneuropathy, with long-term current use of insulin (H) Curt Schneider MD   lisinopril (ZESTRIL) 2.5 MG tablet TAKE ONE TABLET BY MOUTH ONCE DAILY Type 2 diabetes mellitus with diabetic polyneuropathy, with long-term current use of insulin (H); Microalbuminuria Curt Schneider MD   Menthol, Topical Analgesic, (BIOFREEZE EX) Apply topically  as needed to shoulders/knees.  pain Patient Reported   metFORMIN (GLUCOPHAGE) 500 MG tablet Take 2 tablets (1,000 mg) by mouth 2 times daily (with meals) Type 2 diabetes mellitus with diabetic polyneuropathy, with long-term current use of insulin (H) Curt Schneider MD   pravastatin (PRAVACHOL) 80 MG tablet Take 1 tablet (80 mg) by mouth daily Hyperlipidemia LDL Goal <100 Curt Schneider MD   ropivacaine (NAROPIN) injection 3 mL   Acute pain of left shoulder; Tendinopathy of left rotator cuff Loy Avery MD         Add new medications, over-the-counter drugs, herbals, vitamins, or  minerals in the blank rows below.    Medication How I take it Why I use it Prescriber                                      Allergies:      - Lipitor [atorvastatin Calcium]  - Hay Fever & [a.r.m.]        Side effects I have had:      Not on File        Other Information:              My notes and questions:

## 2024-08-14 NOTE — LETTER
August 19, 2024  Felipe RESTREPO Shannan  [address]  [place] [address]    Dear Mr. Campbell, [facility]     Thank you for talking with me on Aug 14, 2024 about your health and medications. As a follow-up to our conversation, I have included two documents:      Your Recommended To-Do List has steps you should take to get the best results from your medications.  Your Medication List will help you keep track of your medications and how to take them.    If you want to talk about these documents, please call Moriah Su RPH at phone: [phone], Monday-Friday 8-4:30pm.    I look forward to working with you and your doctors to make sure your medications work well for you.    Sincerely,  Moriah Su RPH  [facility] Pharmacist, [facility]

## 2024-08-14 NOTE — LETTER
"Recommended To-Do List      Prepared on: Aug 14, 2024       You can get the best results from your medications by completing the items on this \"To-Do List.\"      Bring your To-Do List when you go to your doctor. And, share it with your family or caregivers.    My To-Do List:  What we talked about: What I should do:    What my medicines are for, how to know if my medicines are working, made sure my medicines are safe for me and reviewed how to take my medicines.      Take my medicines every day                  "

## 2024-08-14 NOTE — PROGRESS NOTES
Medication Therapy Management (MTM) Encounter    ASSESSMENT:                            Medication Adherence/Access: No issues identified    Diabetes/Type 2 Diabetes: Stable.  A1C not at goal < 7%, however blood sugar are at goal  mg/dL fasting and < 180 mg/dL post=prandial, plan in place for him to recheck A1C in six months. Aspirin not needed due to age > 70 and primary prevention.    CKD-Stage 2: Stable. Blood pressure at goal < 140/90.    Hyperlipidemia: Stable.     Pain: Stable.    Allergies:  Stable.     Dry eyes:  Stable.     PLAN:                            1. Continue current medications.    Follow-up: Return in about 6 months (around 2025) for Medication Therapy Management.    SUBJECTIVE/OBJECTIVE:                          Leonid Campbell is a 74 year old male seen for a follow-up visit.       Reason for visit: med review.    Allergies/ADRs: Reviewed in chart  Past Medical History: Reviewed in chart  Tobacco: He reports that he quit smoking about 45 years ago. His smoking use included cigarettes. He started smoking about 51 years ago. He has a 3.5 pack-year smoking history. He has never used smokeless tobacco.  Alcohol: Less than 1 beverages / week  Caffeine: 2 cups/day of coffee  Activity: donya chi almost every morning    Medication Adherence/Access:  No concerns  The patient fills medications at [place]: NO, fills medications at [facility].    Diabetes /Type 2 Diabetes:  metformin 1000mg twice daily  Lantus 6 units daily  Novolog 4-5 units three times daily with meals (45-60 g carbs with meals)    Pt is not experiencing side effects.    SMB-5x/day generally before meals, sometimes 2 hours post meal.   Ranges (per patient):   Fasting: ~102, 112  2 hours after meal <180 on occasion slightly higher  Symptoms of low blood sugar? none. Frequency of hypoglycemia? None.  Has glucose tablets available if needed.  Lab Results   Component Value Date    A1C 7.0 2024     Hypertension CKD Stage 2:    Lisinopril 2.5 mg daily     Patient reports no current medication side effects  Patient does not self-monitor blood pressure.    BP Readings from Last 3 Encounters:   06/17/24 128/74   04/06/23 138/80   11/14/22 120/70     Hyperlipidemia   pravastatin 80mg daily    Patient reports no significant myalgias or other side effects. 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  Finds these effective.  He denies side effects.  He's aware of maximum APAP dosing/day. He has history of steroid injections, continues with PT exercises.     Allergies:    Zyrtec 10mg daily PRN seasonally    He finds effective.  He denies side effects.    Dry eyes:     Refresh tears as needed.    States this/these are effective. Denies side effects.     Today's Vitals: There were no vitals taken for this visit.  ----------------      I spent 13 minutes with this patient today. All changes were made via collaborative practice agreement with Curt Schneider MD. A copy of the visit note was provided to the patient's provider(s).    A summary of these recommendations was sent via [employer].    Anna Su, RosalieD  Medication Therapy Management Pharmacist    Telemedicine Visit Details  Type of service:  Telephone visit  Start Time: 2:04 PM  End Time: 2:17 PM     Medication Therapy Recommendations  No medication therapy recommendations to display

## 2024-08-14 NOTE — PATIENT INSTRUCTIONS
"Recommendations from today's MTM visit:                                                         1. Continue current medications.      Follow-up: Return in about 6 months (around 2/14/2025) for Medication Therapy Management.    It was great speaking with you today.  I value your experience and would be very thankful for your time in providing feedback in our clinic survey. In the next few days, you may receive an email or text message from [employer] with a link to a survey related to your  clinical pharmacist.\"     To schedule another MTM appointment, please call the clinic directly or you may call the MTM scheduling line at [phone] or toll-free at [phone].     My Clinical Pharmacist's contact information:                                                      Please feel free to contact me with any questions or concerns you have.      Anna Su, PharmD  Medication Therapy Management Pharmacist     "

## 2024-09-07 ENCOUNTER — HEALTH MAINTENANCE LETTER (OUTPATIENT)
Age: 74
End: 2024-09-07

## 2024-09-13 ENCOUNTER — OFFICE VISIT (OUTPATIENT)
Dept: INTERNAL MEDICINE | Facility: CLINIC | Age: 74
End: 2024-09-13
Payer: COMMERCIAL

## 2024-09-13 VITALS
BODY MASS INDEX: 23.99 KG/M2 | HEART RATE: 118 BPM | OXYGEN SATURATION: 97 % | SYSTOLIC BLOOD PRESSURE: 115 MMHG | TEMPERATURE: 97.5 F | HEIGHT: 76 IN | RESPIRATION RATE: 16 BRPM | WEIGHT: 197 LBS | DIASTOLIC BLOOD PRESSURE: 68 MMHG

## 2024-09-13 DIAGNOSIS — L60.8 TOENAIL DEFORMITY: Primary | ICD-10-CM

## 2024-09-13 DIAGNOSIS — E11.42 TYPE 2 DIABETES MELLITUS WITH DIABETIC POLYNEUROPATHY, WITH LONG-TERM CURRENT USE OF INSULIN (H): ICD-10-CM

## 2024-09-13 DIAGNOSIS — Z79.4 TYPE 2 DIABETES MELLITUS WITH DIABETIC POLYNEUROPATHY, WITH LONG-TERM CURRENT USE OF INSULIN (H): ICD-10-CM

## 2024-09-13 PROCEDURE — 99213 OFFICE O/P EST LOW 20 MIN: CPT | Performed by: INTERNAL MEDICINE

## 2024-09-13 NOTE — PROGRESS NOTES
"  Assessment & Plan     Toenail deformity  Discussed treatment goal with patient regarding left toe nail deformity due to past injury from 4 years ago that has cause overgrowth of new nails with slight brownish discoloration. The right great toe did show faint yellowing towards the outer edge of his great toe nail concerning for onychomycosis. However there is no concern for infections at this time, will continue to monitor and patient will communicate via my-chart if any new changes arises before our next upcoming appointment.    Type 2 diabetes mellitus with diabetic polyneuropathy, with long-term current use of insulin (H)  - Stable on current medication regimen. Will continue to monitor.    Subjective   Leonid is a 74 year old, presenting for the following health issues:  Fungal Infection (Lt big toe )      9/13/2024     9:45 AM   Additional Questions   Roomed by evaristo     History of Present Illness       Reason for visit:  Possible toe fungus  Symptom onset:  3-4 weeks ago   He is taking medications regularly.     Leonid is a 74 year old male who present to the clinic with concern for Fungi nail on his left great toe which he did notice a month ago. He did experience trauma 4 years ago when a wood(  8 by 8 timber) fell on his left great toe. Since that incident his toe nail did fell off and grew back. Denies discharge,itching, or swelling     Review of Systems  Constitutional, HEENT, cardiovascular, pulmonary, gi and gu systems are negative, except as otherwise noted.      Objective    /68   Pulse 118   Temp 97.5  F (36.4  C) (Temporal)   Resp 16   Ht 1.918 m (6' 3.5\")   Wt 89.4 kg (197 lb)   SpO2 97%   BMI 24.30 kg/m    Body mass index is 24.3 kg/m .  Physical Exam   GENERAL: alert and no distress  MS: Over growth nail of left great toe with brownish discoloration and slight yellowing towards the distal area of th right great toe with out edema.  PSYCH: mentation appears normal, affect " normal/bright  Diabetic foot exam: normal DP and PT pulses, no trophic changes or ulcerative lesions, and normal sensory exam    .This patient office visit today was staffed with me, I did review the entire clinical presentation and history, exam and medical decision making with DNP student Arley Mendoza I agree with and have approved the office visit entirely. Patient seen with me and DNP student Arley Mendoza today. I agree with assessment and plans.          Signed Electronically by: Curt Schneider MD

## 2025-03-30 ENCOUNTER — HEALTH MAINTENANCE LETTER (OUTPATIENT)
Age: 75
End: 2025-03-30

## 2025-04-30 ENCOUNTER — VIRTUAL VISIT (OUTPATIENT)
Dept: PHARMACY | Facility: CLINIC | Age: 75
End: 2025-04-30
Payer: COMMERCIAL

## 2025-04-30 DIAGNOSIS — Z79.4 TYPE 2 DIABETES MELLITUS WITH DIABETIC POLYNEUROPATHY, WITH LONG-TERM CURRENT USE OF INSULIN (H): Primary | ICD-10-CM

## 2025-04-30 DIAGNOSIS — E78.5 HYPERLIPIDEMIA LDL GOAL <100: ICD-10-CM

## 2025-04-30 DIAGNOSIS — R80.9 MICROALBUMINURIA: ICD-10-CM

## 2025-04-30 DIAGNOSIS — Z98.49 STATUS POST CATARACT EXTRACTION, UNSPECIFIED LATERALITY: ICD-10-CM

## 2025-04-30 DIAGNOSIS — M17.0 OSTEOARTHRITIS OF BOTH KNEES, UNSPECIFIED OSTEOARTHRITIS TYPE: ICD-10-CM

## 2025-04-30 DIAGNOSIS — N18.2 CKD (CHRONIC KIDNEY DISEASE) STAGE 2, GFR 60-89 ML/MIN: ICD-10-CM

## 2025-04-30 DIAGNOSIS — J30.1 HAYFEVER: ICD-10-CM

## 2025-04-30 DIAGNOSIS — E11.42 TYPE 2 DIABETES MELLITUS WITH DIABETIC POLYNEUROPATHY, WITH LONG-TERM CURRENT USE OF INSULIN (H): Primary | ICD-10-CM

## 2025-04-30 PROCEDURE — 99605 MTMS BY PHARM NP 15 MIN: CPT | Mod: 93 | Performed by: PHARMACIST

## 2025-04-30 PROCEDURE — 99607 MTMS BY PHARM ADDL 15 MIN: CPT | Mod: 93 | Performed by: PHARMACIST

## 2025-04-30 RX ORDER — METFORMIN HYDROCHLORIDE 500 MG/1
1000 TABLET, EXTENDED RELEASE ORAL 2 TIMES DAILY WITH MEALS
Qty: 360 TABLET | Refills: 2 | Status: SHIPPED | OUTPATIENT
Start: 2025-04-30

## 2025-04-30 NOTE — PATIENT INSTRUCTIONS
"Recommendations from today's MTM visit:                                                         Change metformin to metformin ER formulation to see if this helps with the occasional loose stools.  Ok to take acetaminophen 1000 mg twice daily every day if you need to, or even 1000 mg three times daily.  Maximum dose of 3000 mg/24 hours.     Follow-up: Return in about 6 months (around 10/30/2025) for Medication Therapy Management.    It was great speaking with you today.  I value your experience and would be very thankful for your time in providing feedback in our clinic survey. In the next few days, you may receive an email or text message from [employer] with a link to a survey related to your  clinical pharmacist.\"     To schedule another MTM appointment, please call the clinic directly or you may call the MTM scheduling line at [phone] or toll-free at [phone].     My Clinical Pharmacist's contact information:                                                      Please feel free to contact me with any questions or concerns you have.      Anna Su, PharmD  Medication Therapy Management Pharmacist     "

## 2025-04-30 NOTE — LETTER
_  Medication List        Prepared on: Apr 30, 2025     Bring your Medication List when you go to the doctor, hospital, or   emergency room. And, share it with your family or caregivers.     Note any changes to how you take your medications.  Cross out medications when you no longer use them.    Medication How I take it Why I use it Prescriber   acetaminophen (TYLENOL) 500 MG tablet Take 1,000 mg by mouth 3 times daily as needed for mild pain.  pain Patient Reported   blood glucose (ONETOUCH VERIO IQ) test strip TEST FOUR TIMES DAILY AS DIRECTED Type 2 diabetes mellitus with diabetic polyneuropathy, with long-term current use of insulin (H) Curt Schneider MD   carboxymethylcellulose PF (REFRESH PLUS) 0.5 % ophthalmic solution 1 drop 3 times daily as needed for dry eyes  General Health   Patient Reported   cetirizine (ZYRTEC) 10 MG tablet Take 10 mg by mouth daily as needed for allergies  allergies Patient Reported   insulin aspart (NOVOLOG FLEXPEN) 100 UNIT/ML pen INJECT 4 UNITS UNDER THE SKIN THREE TIMES DAILY BEFORE MEAL PLUS CORRECTION FACTOR OF 1/80/80. MAX 25 UNITS PER DAY. Type 2 diabetes mellitus with diabetic polyneuropathy, with long-term current use of insulin (H) Curt Schneider MD   insulin glargine (LANTUS SOLOSTAR) 100 UNIT/ML pen INJECT 6 UNITS UNDER THE SKIN DAILY OR AS DIRECTED. PRIME WITH 2 UNITS EACH TIME(8 UNITS/DAY) Type 2 diabetes mellitus with diabetic polyneuropathy, with long-term current use of insulin (H) Curt Schneider MD   insulin pen needle (B-D U/F) 31G X 5 MM miscellaneous Use 4 pen needles daily or as directed. Type 2 diabetes mellitus with diabetic polyneuropathy, with long-term current use of insulin (H) Curt Schneider MD   lisinopril (ZESTRIL) 2.5 MG tablet TAKE ONE TABLET BY MOUTH ONCE DAILY Type 2 diabetes mellitus with diabetic polyneuropathy, with long-term current use of insulin (H); Microalbuminuria Curt Schneider MD   Menthol, Topical Analgesic, (BIOFREEZE EX) Apply topically as  needed to shoulders/knees.  General Health   Patient Reported   metFORMIN (GLUCOPHAGE XR) 500 MG 24 hr tablet Take 2 tablets (1,000 mg) by mouth 2 times daily (with meals). Type 2 diabetes mellitus with diabetic polyneuropathy, with long-term current use of insulin (H) Curt Schneider MD   pravastatin (PRAVACHOL) 80 MG tablet Take 1 tablet (80 mg) by mouth daily Hyperlipidemia LDL Goal <100 Curt Schneider MD         Add new medications, over-the-counter drugs, herbals, vitamins, or  minerals in the blank rows below.    Medication How I take it Why I use it Prescriber                                      Allergies:      - Lipitor [atorvastatin Calcium]  - Hay Fever & [a.r.m.]        Side effects I have had:      Not on File        Other Information:              My notes and questions:

## 2025-04-30 NOTE — LETTER
"Recommended To-Do List      Prepared on: Apr 30, 2025       You can get the best results from your medications by completing the items on this \"To-Do List.\"      Bring your To-Do List when you go to your doctor. And, share it with your family or caregivers.    My To-Do List:  What we talked about: What I should do:   An issue with your medication    Change the medication you are taking from metformin to metformin ER.          What we talked about: What I should do:                     "

## 2025-04-30 NOTE — LETTER
April 30, 2025  Felipe RESTREPO Shannan  [address]  [place] [address]    Dear Mr. Campbell, [facility]     Thank you for talking with me on Apr 30, 2025 about your health and medications. As a follow-up to our conversation, I have included two documents:      Your Recommended To-Do List has steps you should take to get the best results from your medications.  Your Medication List will help you keep track of your medications and how to take them.    If you want to talk about these documents, please call Moriah Su RPH at phone: [phone], Monday-Friday 8-4:30pm.    I look forward to working with you and your doctors to make sure your medications work well for you.    Sincerely,  Moriah Su RPH  [facility] Pharmacist, [facility]

## 2025-04-30 NOTE — PROGRESS NOTES
Medication Therapy Management (MTM) Encounter    ASSESSMENT:                            Medication Adherence/Access: No issues identified.    Diabetes/Type 2 Diabetes: May benefit from changing to ER metformin. A1C is at goal < 7%.  Aspirin not needed due to age > 70 and primary prevention.    CKD-Stage 2: Stable. Blood pressure at goal < 140/90.    Hyperlipidemia: Stable.     Pain: Could increase acetaminophen to scheduled .    Allergies:  Stable.     Dry eyes:  Stable.     PLAN:                            Change metformin to metformin ER formulation to see if this helps with the occasional loose stools.  Ok to take acetaminophen 1000 mg twice daily every day if you need to, or even 1000 mg three times daily.  Maximum dose of 3000 mg/24 hours.     Follow-up: Return in about 6 months (around 10/30/2025) for Medication Therapy Management.    SUBJECTIVE/OBJECTIVE:                          Leonid Campbell is a 74 year old male seen for a follow-up visit.       Reason for visit: med review.    Allergies/ADRs: Reviewed in chart  Past Medical History: Reviewed in chart  Tobacco: He reports that he quit smoking about 46 years ago. His smoking use included cigarettes. He started smoking about 52 years ago. He has a 3.5 pack-year smoking history. He has never used smokeless tobacco.  Alcohol: Less than 1 beverages / week  Caffeine: 2 cups/day of coffee  Activity: donya chi almost every morning    Medication Adherence/Access:  No concerns  The patient fills medications at [place]: NO, fills medications at [facility].    Diabetes /Type 2 Diabetes:  metformin 1000mg twice daily  Lantus 6 units daily  Novolog 4-6 units three times daily with meals (45-60 g carbs with meals)    Side effects: very occasional bouts of diarrhea  SMB-5x/day generally before meals, sometimes 2 hours post meal.   Ranges (per patient):   Fasting: ~100-104  2 hours after meal <180   Symptoms of low blood sugar? none. Frequency of hypoglycemia? None.  Has  glucose tablets available if needed.    Hypertension CKD Stage 2:   Lisinopril 2.5 mg daily     Patient reports no current medication side effects  Patient does not self-monitor blood pressure.      Hyperlipidemia   pravastatin 80mg daily    Patient reports no significant myalgias or other side effects. 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   He denies side effects.  He's aware of maximum APAP dosing/day. He has history of steroid injections, continues with PT exercises.     Allergies:    Zyrtec 10mg daily as needed seasonally    He finds effective.  He denies side effects.    Dry eyes:     Refresh tears as needed.    States this/these are effective. Denies side effects.     Today's Vitals: There were no vitals taken for this visit.  ----------------      I spent 23 minutes with this patient today. All changes were made via collaborative practice agreement with Curt Schneider MD.     A summary of these recommendations was sent via [employer].    Anna Su, RosalieD  Medication Therapy Management Pharmacist    Telemedicine Visit Details  The patient's medications can be safely assessed via a telemedicine encounter.  Type of service:  Telephone visit  Originating Location (pt. Location): Home    Distant Location (provider location):  On-site  Start Time: 1:34 PM  End Time:  1:57 PM     Medication Therapy Recommendations  Type 2 diabetes mellitus with diabetic polyneuropathy, with long-term current use of insulin (H)   1 Current Medication: metFORMIN (GLUCOPHAGE XR) 500 MG 24 hr tablet   Current Medication Sig: Take 2 tablets (1,000 mg) by mouth 2 times daily (with meals).   Rationale: Undesirable effect - Adverse medication event - Safety   Recommendation: Change Medication Formulation    Status: Accepted per CPA    Identified Date: 4/30/2025 Completed Date: 4/30/2025

## 2025-06-03 ENCOUNTER — MYC MEDICAL ADVICE (OUTPATIENT)
Dept: INTERNAL MEDICINE | Facility: CLINIC | Age: 75
End: 2025-06-03
Payer: COMMERCIAL

## 2025-06-03 DIAGNOSIS — R80.9 MICROALBUMINURIA: ICD-10-CM

## 2025-06-03 DIAGNOSIS — Z79.4 TYPE 2 DIABETES MELLITUS WITH DIABETIC POLYNEUROPATHY, WITH LONG-TERM CURRENT USE OF INSULIN (H): ICD-10-CM

## 2025-06-03 DIAGNOSIS — E11.42 TYPE 2 DIABETES MELLITUS WITH DIABETIC POLYNEUROPATHY, WITH LONG-TERM CURRENT USE OF INSULIN (H): ICD-10-CM

## 2025-06-03 DIAGNOSIS — E78.5 HYPERLIPIDEMIA LDL GOAL <100: Primary | ICD-10-CM

## 2025-06-03 DIAGNOSIS — N18.2 CKD (CHRONIC KIDNEY DISEASE) STAGE 2, GFR 60-89 ML/MIN: ICD-10-CM

## 2025-06-23 ENCOUNTER — LAB (OUTPATIENT)
Dept: LAB | Facility: CLINIC | Age: 75
End: 2025-06-23
Payer: COMMERCIAL

## 2025-06-23 DIAGNOSIS — Z79.4 TYPE 2 DIABETES MELLITUS WITH DIABETIC POLYNEUROPATHY, WITH LONG-TERM CURRENT USE OF INSULIN (H): ICD-10-CM

## 2025-06-23 DIAGNOSIS — R80.9 MICROALBUMINURIA: ICD-10-CM

## 2025-06-23 DIAGNOSIS — E11.42 TYPE 2 DIABETES MELLITUS WITH DIABETIC POLYNEUROPATHY, WITH LONG-TERM CURRENT USE OF INSULIN (H): ICD-10-CM

## 2025-06-23 DIAGNOSIS — N18.2 CKD (CHRONIC KIDNEY DISEASE) STAGE 2, GFR 60-89 ML/MIN: ICD-10-CM

## 2025-06-23 DIAGNOSIS — E78.5 HYPERLIPIDEMIA LDL GOAL <100: ICD-10-CM

## 2025-06-23 LAB
ANION GAP SERPL CALCULATED.3IONS-SCNC: 9 MMOL/L (ref 7–15)
BUN SERPL-MCNC: 16.2 MG/DL (ref 8–23)
CALCIUM SERPL-MCNC: 9.8 MG/DL (ref 8.8–10.4)
CHLORIDE SERPL-SCNC: 106 MMOL/L (ref 98–107)
CHOLEST SERPL-MCNC: 154 MG/DL
CREAT SERPL-MCNC: 0.99 MG/DL (ref 0.67–1.17)
CREAT UR-MCNC: 57.6 MG/DL
EGFRCR SERPLBLD CKD-EPI 2021: 80 ML/MIN/1.73M2
EST. AVERAGE GLUCOSE BLD GHB EST-MCNC: 131 MG/DL
FASTING STATUS PATIENT QL REPORTED: YES
FASTING STATUS PATIENT QL REPORTED: YES
GLUCOSE SERPL-MCNC: 106 MG/DL (ref 70–99)
HBA1C MFR BLD: 6.2 % (ref 0–5.6)
HCO3 SERPL-SCNC: 25 MMOL/L (ref 22–29)
HDLC SERPL-MCNC: 50 MG/DL
LDLC SERPL CALC-MCNC: 81 MG/DL
MICROALBUMIN UR-MCNC: 14.4 MG/L
MICROALBUMIN/CREAT UR: 25 MG/G CR (ref 0–17)
NONHDLC SERPL-MCNC: 104 MG/DL
POTASSIUM SERPL-SCNC: 5 MMOL/L (ref 3.4–5.3)
SODIUM SERPL-SCNC: 140 MMOL/L (ref 135–145)
TRIGL SERPL-MCNC: 116 MG/DL

## 2025-06-23 PROCEDURE — 80061 LIPID PANEL: CPT

## 2025-06-23 PROCEDURE — 36415 COLL VENOUS BLD VENIPUNCTURE: CPT

## 2025-06-23 PROCEDURE — 80048 BASIC METABOLIC PNL TOTAL CA: CPT

## 2025-06-23 PROCEDURE — 82043 UR ALBUMIN QUANTITATIVE: CPT

## 2025-06-23 PROCEDURE — 83036 HEMOGLOBIN GLYCOSYLATED A1C: CPT

## 2025-06-23 PROCEDURE — 82570 ASSAY OF URINE CREATININE: CPT

## 2025-06-24 ENCOUNTER — OFFICE VISIT (OUTPATIENT)
Dept: OPHTHALMOLOGY | Facility: CLINIC | Age: 75
End: 2025-06-24
Payer: COMMERCIAL

## 2025-06-24 ENCOUNTER — RESULTS FOLLOW-UP (OUTPATIENT)
Dept: FAMILY MEDICINE | Facility: CLINIC | Age: 75
End: 2025-06-24

## 2025-06-24 DIAGNOSIS — Z79.4 TYPE 2 DIABETES MELLITUS WITH DIABETIC POLYNEUROPATHY, WITH LONG-TERM CURRENT USE OF INSULIN (H): ICD-10-CM

## 2025-06-24 DIAGNOSIS — H43.813 POSTERIOR VITREOUS DETACHMENT OF BOTH EYES: ICD-10-CM

## 2025-06-24 DIAGNOSIS — E11.42 TYPE 2 DIABETES MELLITUS WITH DIABETIC POLYNEUROPATHY, WITH LONG-TERM CURRENT USE OF INSULIN (H): ICD-10-CM

## 2025-06-24 DIAGNOSIS — H52.4 MYOPIA OF BOTH EYES WITH REGULAR ASTIGMATISM AND PRESBYOPIA: ICD-10-CM

## 2025-06-24 DIAGNOSIS — H52.13 MYOPIA OF BOTH EYES WITH REGULAR ASTIGMATISM AND PRESBYOPIA: ICD-10-CM

## 2025-06-24 DIAGNOSIS — Z96.1 PSEUDOPHAKIA: ICD-10-CM

## 2025-06-24 DIAGNOSIS — Z01.00 EXAMINATION OF EYES AND VISION: Primary | ICD-10-CM

## 2025-06-24 DIAGNOSIS — H52.223 MYOPIA OF BOTH EYES WITH REGULAR ASTIGMATISM AND PRESBYOPIA: ICD-10-CM

## 2025-06-24 PROCEDURE — 92014 COMPRE OPH EXAM EST PT 1/>: CPT | Performed by: STUDENT IN AN ORGANIZED HEALTH CARE EDUCATION/TRAINING PROGRAM

## 2025-06-24 PROCEDURE — 92015 DETERMINE REFRACTIVE STATE: CPT | Performed by: STUDENT IN AN ORGANIZED HEALTH CARE EDUCATION/TRAINING PROGRAM

## 2025-06-24 ASSESSMENT — CONF VISUAL FIELD
OD_INFERIOR_NASAL_RESTRICTION: 0
OD_SUPERIOR_TEMPORAL_RESTRICTION: 0
OS_SUPERIOR_NASAL_RESTRICTION: 0
OD_SUPERIOR_NASAL_RESTRICTION: 0
OD_NORMAL: 1
OS_INFERIOR_NASAL_RESTRICTION: 0
OD_INFERIOR_TEMPORAL_RESTRICTION: 0
OS_NORMAL: 1
OS_INFERIOR_TEMPORAL_RESTRICTION: 0
OS_SUPERIOR_TEMPORAL_RESTRICTION: 0

## 2025-06-24 ASSESSMENT — VISUAL ACUITY
OD_CC: 20/25
METHOD: SNELLEN - LINEAR
OD_CC: J1-
OS_CC: J5
OS_CC: 20/25
OS_CC+: -2
CORRECTION_TYPE: GLASSES

## 2025-06-24 ASSESSMENT — REFRACTION_MANIFEST
OS_AXIS: 018
OD_AXIS: 016
OD_CYLINDER: +1.00
OD_ADD: +2.00
OS_CYLINDER: +1.50
OS_ADD: +2.00
OD_SPHERE: -0.50
OS_SPHERE: -0.75

## 2025-06-24 ASSESSMENT — EXTERNAL EXAM - LEFT EYE: OS_EXAM: 2+ BROW PTOSIS, MILD TO MOD BROW

## 2025-06-24 ASSESSMENT — REFRACTION_WEARINGRX
SPECS_TYPE: PAL
OD_SPHERE: -0.75
OS_CYLINDER: +1.25
OS_ADD: +2.00
OS_AXIS: 013
OS_SPHERE: -0.75
OD_CYLINDER: +0.75
OD_AXIS: 172
OD_ADD: +2.00

## 2025-06-24 ASSESSMENT — EXTERNAL EXAM - RIGHT EYE: OD_EXAM: 2+ BROW PTOSIS, MILD TO MOD BROW

## 2025-06-24 ASSESSMENT — TONOMETRY
IOP_METHOD: APPLANATION
OS_IOP_MMHG: 19
OD_IOP_MMHG: 15

## 2025-06-24 ASSESSMENT — SLIT LAMP EXAM - LIDS
COMMENTS: NORMAL
COMMENTS: NORMAL

## 2025-06-24 ASSESSMENT — CUP TO DISC RATIO
OS_RATIO: 0.3
OD_RATIO: 0.3

## 2025-06-24 NOTE — PROGRESS NOTES
" Current Eye Medications:  Refresh both eyes each morning.       Subjective:  Patient is here for a Diabetic Eye Exam.   The patient noticed a change in floaters in his left eye - now he has multiple tiny, black spots \"like looking at a star field\" especially noticeable outdoors in the bright light.   This is less noticeable indoors.  Vision in his right eye is good.      Lab Results   Component Value Date    A1C 6.2 06/23/2025    A1C 6.6 12/13/2024    A1C 7.0 05/09/2024    A1C 6.6 10/11/2023    A1C 6.9 03/16/2023    A1C 6.4 07/06/2021    A1C 7.1 02/15/2021    A1C 6.3 07/29/2020    A1C 6.7 01/13/2020    A1C 6.4 07/09/2019      Objective:  See Ophthalmology Exam.       Assessment:  Felipe Campbell is a 75 year old male who presents with:   Encounter Diagnoses   Name Primary?    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 Avoid generic artificial tears or \"get the red out\" drops).     Glasses prescription given - optional to update    Keep blood sugars and blood pressure under good control.    Concha Oglesby MD  [phone]    Patient Education  Diabetes weakens the blood vessels all over the body, including the eyes. Damage to the blood vessels in the eyes can cause swelling or bleeding into part of the eye (called the retina). This is called diabetic retinopathy (KAMERON-[id number]-AH-puh-[id number]). If not treated, this disease can cause vision loss or blindness.   Symptoms may include blurred or distorted vision, but many people have no symptoms. It's important to see your eye doctor regularly to check for problems.   Early treatment and good " control can help protect your vision. Here are the things you can do to help prevent vision loss:      1. Keep your blood sugar levels under tight control.      2. Bring high blood pressure under control.      3. No smoking.      4. Have yearly dilated eye exams.

## 2025-06-24 NOTE — PATIENT INSTRUCTIONS
"Continue artificial tears up to four times a day as needed (Refresh Plus or Refresh Optive or TheraTears. Avoid generic artificial tears or \"get the red out\" drops).     Glasses prescription given - optional to update    Keep blood sugars and blood pressure under good control.    Concha Oglesby MD  [phone]    Patient Education   Diabetes weakens the blood vessels all over the body, including the eyes. Damage to the blood vessels in the eyes can cause swelling or bleeding into part of the eye (called the retina). This is called diabetic retinopathy (KAMERON-tin--pu-thee). If not treated, this disease can cause vision loss or blindness.   Symptoms may include blurred or distorted vision, but many people have no symptoms. It's important to see your eye doctor regularly to check for problems.   Early treatment and good control can help protect your vision. Here are the things you can do to help prevent vision loss:      1. Keep your blood sugar levels under tight control.      2. Bring high blood pressure under control.      3. No smoking.      4. Have yearly dilated eye exams.       "

## 2025-06-24 NOTE — LETTER
"6/24/2025      Felipe Campbell  [address]  [place] [address]      Dear Colleague,    Thank you for referring your patient, Felipe Campbell, to the [facility]. Please see a copy of my visit note below.     Current Eye Medications:  Refresh both eyes each morning.       Subjective:  Patient is here for a Diabetic Eye Exam.   The patient noticed a change in floaters in his left eye - now he has multiple tiny, black spots \"like looking at a star field\" especially noticeable outdoors in the bright light.   This is less noticeable indoors.  Vision in his right eye is good.      Lab Results   Component Value Date    A1C 6.2 06/23/2025    A1C 6.6 12/13/2024    A1C 7.0 05/09/2024    A1C 6.6 10/11/2023    A1C 6.9 03/16/2023    A1C 6.4 07/06/2021    A1C 7.1 02/15/2021    A1C 6.3 07/29/2020    A1C 6.7 01/13/2020    A1C 6.4 07/09/2019      Objective:  See Ophthalmology Exam.       Assessment:  Felipe Campbell is a 75 year old male who presents with:   Encounter Diagnoses   Name Primary?     Examination of eyes and vision      Type 2 diabetes mellitus with diabetic polyneuropathy, with long-term current use of insulin (H) No diabetic retinopathy      Pseudophakia - Both Eyes s/p YAG cap OD      Posterior vitreous detachment of both eyes      Myopia of both eyes with regular astigmatism and presbyopia        Plan:  Continue artificial tears up to four times a day as needed (Refresh Plus or Refresh Optive or TheraTears. Avoid generic artificial tears or \"get the red out\" drops).     Glasses prescription given - optional to update    Keep blood sugars and blood pressure under good control.    Concha Oglesby MD  [phone]    Patient Education  Diabetes weakens the blood vessels all over the body, including the eyes. Damage to the blood vessels in the eyes can cause swelling or bleeding into part of the eye (called the retina). This is called diabetic retinopathy (KAMERON-tin-AH-puh-thee). " If not treated, this disease can cause vision loss or blindness.   Symptoms may include blurred or distorted vision, but many people have no symptoms. It's important to see your eye doctor regularly to check for problems.   Early treatment and good control can help protect your vision. Here are the things you can do to help prevent vision loss:      1. Keep your blood sugar levels under tight control.      2. Bring high blood pressure under control.      3. No smoking.      4. Have yearly dilated eye exams.         Again, thank you for allowing me to participate in the care of your patient.        Sincerely,        Concha Oglesby MD    Electronically signed

## 2025-06-26 ENCOUNTER — OFFICE VISIT (OUTPATIENT)
Dept: INTERNAL MEDICINE | Facility: CLINIC | Age: 75
End: 2025-06-26
Payer: COMMERCIAL

## 2025-06-26 VITALS
RESPIRATION RATE: 16 BRPM | SYSTOLIC BLOOD PRESSURE: 123 MMHG | BODY MASS INDEX: 24.6 KG/M2 | OXYGEN SATURATION: 99 % | WEIGHT: 202 LBS | DIASTOLIC BLOOD PRESSURE: 76 MMHG | HEART RATE: 55 BPM | TEMPERATURE: 97.3 F | HEIGHT: 76 IN

## 2025-06-26 DIAGNOSIS — Z79.4 TYPE 2 DIABETES MELLITUS WITH DIABETIC POLYNEUROPATHY, WITH LONG-TERM CURRENT USE OF INSULIN (H): ICD-10-CM

## 2025-06-26 DIAGNOSIS — E78.5 HYPERLIPIDEMIA LDL GOAL <100: ICD-10-CM

## 2025-06-26 DIAGNOSIS — E11.42 TYPE 2 DIABETES MELLITUS WITH DIABETIC POLYNEUROPATHY, WITH LONG-TERM CURRENT USE OF INSULIN (H): ICD-10-CM

## 2025-06-26 DIAGNOSIS — R80.9 MICROALBUMINURIA: ICD-10-CM

## 2025-06-26 RX ORDER — METFORMIN HYDROCHLORIDE 500 MG/1
1000 TABLET, EXTENDED RELEASE ORAL 2 TIMES DAILY WITH MEALS
Qty: 360 TABLET | Refills: 3 | Status: SHIPPED | OUTPATIENT
Start: 2025-06-26

## 2025-06-26 RX ORDER — INSULIN GLARGINE 100 [IU]/ML
INJECTION, SOLUTION SUBCUTANEOUS
Qty: 15 ML | Refills: 3 | Status: SHIPPED | OUTPATIENT
Start: 2025-06-26

## 2025-06-26 RX ORDER — INSULIN ASPART 100 [IU]/ML
INJECTION, SOLUTION INTRAVENOUS; SUBCUTANEOUS
Qty: 45 ML | Refills: 3 | Status: SHIPPED | OUTPATIENT
Start: 2025-06-26

## 2025-06-26 RX ORDER — PRAVASTATIN SODIUM 80 MG/1
80 TABLET ORAL DAILY
Qty: 90 TABLET | Refills: 3 | Status: SHIPPED | OUTPATIENT
Start: 2025-06-26

## 2025-06-26 RX ORDER — LISINOPRIL 2.5 MG/1
TABLET ORAL
Qty: 90 TABLET | Refills: 3 | Status: SHIPPED | OUTPATIENT
Start: 2025-06-26

## 2025-06-26 NOTE — PROGRESS NOTES
Assessment & Plan     HYPERLIPIDEMIA LDL GOAL <100  This is a well controlled diabetic patient who has had this diagnosis for roughly 17 years and has always been one of the more meticulous patients. Today we literally can do nothing much beyond discussion and appreciate his hard work. He's current with everything and has mostly just developed some mild to moderate bilateral diabetes neuropathy. He's current with all diabetes mellitus benchmarks . Pre-clinic laboratory studies done and reviewed with patient today   - pravastatin (PRAVACHOL) 80 MG tablet; Take 1 tablet (80 mg) by mouth daily.    Type 2 diabetes mellitus with diabetic polyneuropathy, with long-term current use of insulin (H)  Wonderful control. As detailed above   - REVIEW OF HEALTH MAINTENANCE PROTOCOL ORDERS  - metFORMIN (GLUCOPHAGE XR) 500 MG 24 hr tablet; Take 2 tablets (1,000 mg) by mouth 2 times daily (with meals).  - lisinopril (ZESTRIL) 2.5 MG tablet; TAKE ONE TABLET BY MOUTH ONCE DAILY  - insulin glargine (LANTUS SOLOSTAR) 100 UNIT/ML pen; INJECT 6 UNITS UNDER THE SKIN DAILY OR AS DIRECTED. PRIME WITH 2 UNITS EACH TIME(8 UNITS/DAY)  - insulin aspart (NOVOLOG FLEXPEN) 100 UNIT/ML pen; INJECT 4 UNITS UNDER THE SKIN THREE TIMES DAILY BEFORE MEAL PLUS CORRECTION FACTOR OF 1/80/80. MAX 25 UNITS PER DAY.  - Hemoglobin A1c; Future    Microalbuminuria  He does not even have a hypertension diagnosis. He takes the angiotensin converting enzyme inhibitor as a renal protective medication only  - lisinopril (ZESTRIL) 2.5 MG tablet; TAKE ONE TABLET BY MOUTH ONCE DAILY        Blood sugar testing frequency justification:  takes insulin and risks of hypoglycemia       Twin Jaquez is a 75 year old, presenting for the following health issues:  Diabetes        6/26/2025     8:29 AM   Additional Questions   Roomed by evaristo       Via the Health Maintenance questionnaire, the patient has reported the following services have been completed -Eye Exam:  [facility] 2024-06-04, this information has been sent to the abstraction team.  History of Present Illness       CKD: He uses over the counter pain medication, including Aceteminophen, two times daily.    Diabetes:   He presents for follow up of diabetes.  He is checking home blood glucose three times daily.   He checks blood glucose before and after meals.  Blood glucose is never over 200 and never under 70. He is aware of hypoglycemia symptoms including dizziness.    He has no concerns regarding his diabetes at this time.  He is having numbness in feet.  The patient has had a diabetic eye exam in the last 12 months. Eye exam performed on June 2025. Location of last eye exam [facility].        Hyperlipidemia:  He presents for follow up of hyperlipidemia.   He is taking medication to lower cholesterol. He is not having myalgia or other side effects to statin medications.    He eats 2-3 servings of fruits and vegetables daily.He consumes 0 sweetened beverage(s) daily.He exercises with enough effort to increase his heart rate 30 to 60 minutes per day.  He exercises with enough effort to increase his heart rate 4 days per week.   He is taking medications regularly.     Current with diabetic eye exam - he is status post bilateral cataract extractions   Prescriptions reviewed     Lab Results   Component Value Date    A1C 6.2 06/23/2025    A1C 6.6 12/13/2024    A1C 7.0 05/09/2024    A1C 6.6 10/11/2023    A1C 6.9 03/16/2023    A1C 6.4 07/06/2021    A1C 7.1 02/15/2021    A1C 6.3 07/29/2020    A1C 6.7 01/13/2020    A1C 6.4 07/09/2019     He has some mild to moderate diabetes neuropathy symptoms . Toes back to arches . Not affecting his gait. Takes walks with spouse along with bicycle.    Finger pokes to draw blood for the glucometer, declines a optical glucose scanner     Blood glucose goals , morning fasting blood glucose - below 130, low blood glucose was a 75 after heavy physical labor , more often 104-108  The  "highest blood glucose was a 190. This was a two hour post prandial [ 2 hours after eating ] .     Health Maintenance Due   Topic Date Due    MEDICARE ANNUAL WELLNESS VISIT  Never done    COVID-19 VACCINE (8 - 2024-25 season) 03/19/2025    ANNUAL REVIEW OF HM ORDERS  06/17/2025           Review of Systems  Constitutional, HEENT, cardiovascular, pulmonary, gi and gu systems are negative, except as otherwise noted.      Objective    /76   Pulse 55   Temp 97.3  F (36.3  C) (Temporal)   Resp 16   Ht 1.918 m (6' 3.5\")   Wt 91.6 kg (202 lb)   SpO2 99%   BMI 24.91 kg/m    Body mass index is 24.91 kg/m .  BP Readings from Last 6 Encounters:   06/26/25 123/76   09/13/24 115/68   06/17/24 128/74   04/06/23 138/80   11/14/22 120/70   10/24/22 122/68       Physical Exam   GENERAL: alert and no distress  EYES: Eyes grossly normal to inspection, PERRL and conjunctivae and sclerae normal  HENT: ear canals and TM's normal, nose and mouth without ulcers or lesions  RESP: lungs clear to auscultation - no rales, rhonchi or wheezes  CV: regular rate and rhythm, normal S1 S2, no S3 or S4, no murmur, click or rub, no peripheral edema  ABDOMEN: soft, nontender, no hepatosplenomegaly, no masses and bowel sounds normal  MS: no gross musculoskeletal defects noted, no edema  SKIN: no suspicious lesions or rashes  NEURO: Normal strength and tone, mentation intact and speech normal  PSYCH: mentation appears normal, affect normal/bright    Orders Placed This Encounter   Procedures    REVIEW OF HEALTH MAINTENANCE PROTOCOL ORDERS    Hemoglobin A1c     The hemoglobin a1c  [ diabetes test ] is ordered as a future order. In 6 months we need only the hemoglobin a1c  [ diabetes test ] and we don't need a face to face encounter for one year  going forwards from here from here        Signed Electronically by: Curt Schneider MD    "

## 2025-07-21 NOTE — PATIENT INSTRUCTIONS
Unsuccessful trial of ProPlus MF contact lenses.   Patient prefers previous lenses- Renovation Multifocal ([employer]).    Renovation Multifocal contact lens prescription provided today.       Frederic Lopez O.D.  [address]  [place]  [address]    [phone]     Quality 508: Adult Covid-19 Vaccination Status: Patients who are not up to date on their COVID-19 vaccinations as defined by CDC recommendations on current vaccination Detail Level: Detailed Quality 226: Preventive Care And Screening: Tobacco Use: Screening And Cessation Intervention: Patient screened for tobacco use and is an ex/non-smoker Quality 47: Advance Care Plan: Advance Care Planning discussed and documented; advance care plan or surrogate decision maker documented in the medical record.

## 2025-08-04 PROBLEM — Z98.49 STATUS POST CATARACT EXTRACTION, UNSPECIFIED LATERALITY: Status: RESOLVED | Noted: 2023-04-06 | Resolved: 2025-08-04

## (undated) DEVICE — EYE PACK CUSTOM CATARACT AS12127-01

## (undated) DEVICE — GLOVE PROTEXIS W/NEU-THERA 7.0  2D73TE70

## (undated) RX ORDER — ACETAMINOPHEN 325 MG/1
TABLET ORAL
Status: DISPENSED
Start: 2022-10-24

## (undated) RX ORDER — FENTANYL CITRATE 50 UG/ML
INJECTION, SOLUTION INTRAMUSCULAR; INTRAVENOUS
Status: DISPENSED
Start: 2022-10-24

## (undated) RX ORDER — ACETAMINOPHEN 325 MG/1
TABLET ORAL
Status: DISPENSED
Start: 2022-11-14